# Patient Record
Sex: MALE | Race: WHITE | NOT HISPANIC OR LATINO
[De-identification: names, ages, dates, MRNs, and addresses within clinical notes are randomized per-mention and may not be internally consistent; named-entity substitution may affect disease eponyms.]

---

## 2017-02-21 ENCOUNTER — APPOINTMENT (OUTPATIENT)
Dept: CARDIOTHORACIC SURGERY | Facility: CLINIC | Age: 82
End: 2017-02-21

## 2017-02-21 VITALS
DIASTOLIC BLOOD PRESSURE: 72 MMHG | RESPIRATION RATE: 16 BRPM | HEIGHT: 69 IN | TEMPERATURE: 98 F | WEIGHT: 205 LBS | SYSTOLIC BLOOD PRESSURE: 132 MMHG | OXYGEN SATURATION: 96 % | BODY MASS INDEX: 30.36 KG/M2 | HEART RATE: 58 BPM

## 2017-02-21 VITALS — SYSTOLIC BLOOD PRESSURE: 128 MMHG | DIASTOLIC BLOOD PRESSURE: 69 MMHG

## 2017-02-21 DIAGNOSIS — Z87.891 PERSONAL HISTORY OF NICOTINE DEPENDENCE: ICD-10-CM

## 2017-02-27 ENCOUNTER — OUTPATIENT (OUTPATIENT)
Dept: OUTPATIENT SERVICES | Facility: HOSPITAL | Age: 82
LOS: 1 days | End: 2017-02-27

## 2017-02-27 ENCOUNTER — APPOINTMENT (OUTPATIENT)
Dept: CV DIAGNOSITCS | Facility: HOSPITAL | Age: 82
End: 2017-02-27

## 2017-02-27 ENCOUNTER — OUTPATIENT (OUTPATIENT)
Dept: OUTPATIENT SERVICES | Facility: HOSPITAL | Age: 82
LOS: 1 days | End: 2017-02-27
Payer: MEDICARE

## 2017-02-27 VITALS
DIASTOLIC BLOOD PRESSURE: 76 MMHG | TEMPERATURE: 98 F | SYSTOLIC BLOOD PRESSURE: 161 MMHG | HEIGHT: 69 IN | WEIGHT: 205.03 LBS | OXYGEN SATURATION: 98 %

## 2017-02-27 DIAGNOSIS — I35.0 NONRHEUMATIC AORTIC (VALVE) STENOSIS: ICD-10-CM

## 2017-02-27 DIAGNOSIS — Z96.652 PRESENCE OF LEFT ARTIFICIAL KNEE JOINT: Chronic | ICD-10-CM

## 2017-02-27 DIAGNOSIS — Z95.1 PRESENCE OF AORTOCORONARY BYPASS GRAFT: Chronic | ICD-10-CM

## 2017-02-27 DIAGNOSIS — I35.9 NONRHEUMATIC AORTIC VALVE DISORDER, UNSPECIFIED: ICD-10-CM

## 2017-02-27 LAB
ALBUMIN SERPL ELPH-MCNC: 4.8 G/DL — SIGNIFICANT CHANGE UP (ref 3.3–5)
ALP SERPL-CCNC: 73 U/L — SIGNIFICANT CHANGE UP (ref 40–120)
ALT FLD-CCNC: 26 U/L RC — SIGNIFICANT CHANGE UP (ref 10–45)
ANION GAP SERPL CALC-SCNC: 13 MMOL/L — SIGNIFICANT CHANGE UP (ref 5–17)
AST SERPL-CCNC: 25 U/L — SIGNIFICANT CHANGE UP (ref 10–40)
BILIRUB SERPL-MCNC: 0.8 MG/DL — SIGNIFICANT CHANGE UP (ref 0.2–1.2)
BUN SERPL-MCNC: 24 MG/DL — HIGH (ref 7–23)
CALCIUM SERPL-MCNC: 10.2 MG/DL — SIGNIFICANT CHANGE UP (ref 8.4–10.5)
CHLORIDE SERPL-SCNC: 103 MMOL/L — SIGNIFICANT CHANGE UP (ref 96–108)
CO2 SERPL-SCNC: 27 MMOL/L — SIGNIFICANT CHANGE UP (ref 22–31)
CREAT SERPL-MCNC: 1.14 MG/DL — SIGNIFICANT CHANGE UP (ref 0.5–1.3)
GLUCOSE SERPL-MCNC: 121 MG/DL — HIGH (ref 70–99)
HCT VFR BLD CALC: 46.2 % — SIGNIFICANT CHANGE UP (ref 39–50)
HGB BLD-MCNC: 16.4 G/DL — SIGNIFICANT CHANGE UP (ref 13–17)
MCHC RBC-ENTMCNC: 32.3 PG — SIGNIFICANT CHANGE UP (ref 27–34)
MCHC RBC-ENTMCNC: 35.4 GM/DL — SIGNIFICANT CHANGE UP (ref 32–36)
MCV RBC AUTO: 91.2 FL — SIGNIFICANT CHANGE UP (ref 80–100)
PLATELET # BLD AUTO: 193 K/UL — SIGNIFICANT CHANGE UP (ref 150–400)
POTASSIUM SERPL-MCNC: 4.4 MMOL/L — SIGNIFICANT CHANGE UP (ref 3.5–5.3)
POTASSIUM SERPL-SCNC: 4.4 MMOL/L — SIGNIFICANT CHANGE UP (ref 3.5–5.3)
PROT SERPL-MCNC: 7.7 G/DL — SIGNIFICANT CHANGE UP (ref 6–8.3)
RBC # BLD: 5.07 M/UL — SIGNIFICANT CHANGE UP (ref 4.2–5.8)
RBC # FLD: 13.5 % — SIGNIFICANT CHANGE UP (ref 10.3–14.5)
SODIUM SERPL-SCNC: 143 MMOL/L — SIGNIFICANT CHANGE UP (ref 135–145)
WBC # BLD: 7.6 K/UL — SIGNIFICANT CHANGE UP (ref 3.8–10.5)
WBC # FLD AUTO: 7.6 K/UL — SIGNIFICANT CHANGE UP (ref 3.8–10.5)

## 2017-02-27 PROCEDURE — 93010 ELECTROCARDIOGRAM REPORT: CPT

## 2017-02-27 PROCEDURE — 93306 TTE W/DOPPLER COMPLETE: CPT | Mod: 26

## 2017-02-27 PROCEDURE — 93459 L HRT ART/GRFT ANGIO: CPT

## 2017-02-27 PROCEDURE — 93459 L HRT ART/GRFT ANGIO: CPT | Mod: 26

## 2017-02-27 PROCEDURE — C1887: CPT

## 2017-02-27 PROCEDURE — 85027 COMPLETE CBC AUTOMATED: CPT

## 2017-02-27 PROCEDURE — 80053 COMPREHEN METABOLIC PANEL: CPT

## 2017-02-27 PROCEDURE — C1894: CPT

## 2017-02-27 PROCEDURE — 93005 ELECTROCARDIOGRAM TRACING: CPT

## 2017-02-27 PROCEDURE — 93306 TTE W/DOPPLER COMPLETE: CPT

## 2017-02-27 PROCEDURE — 93567 NJX CAR CTH SPRVLV AORTGRPHY: CPT

## 2017-02-27 PROCEDURE — C1769: CPT

## 2017-02-27 RX ORDER — SODIUM CHLORIDE 9 MG/ML
3 INJECTION INTRAMUSCULAR; INTRAVENOUS; SUBCUTANEOUS EVERY 8 HOURS
Qty: 0 | Refills: 0 | Status: DISCONTINUED | OUTPATIENT
Start: 2017-02-27 | End: 2017-03-14

## 2017-02-27 NOTE — H&P CARDIOLOGY - PMH
Atrial fibrillation  post-surgery, resolved  BPH (benign prostatic hyperplasia)    CAD (coronary artery disease)    Carpal tunnel syndrome of right wrist    HTN (hypertension)    Hyperlipidemia    Hypothyroidism    Inguinal hernia, bilateral

## 2017-02-27 NOTE — H&P CARDIOLOGY - HISTORY OF PRESENT ILLNESS
80 yo male with PMH significant for CAD s/p 3 vessel CABG (1996) and stenting (2004), Afib (post-surgery, resolved), HTN, HLD, and Hypothyroidism. mild AS c/o of exertional dyspnea & chest tightness, denies chest pain, diaphoresis, seen & evaluated by Dr Wai Grajeda, & now recommends for cardiac cath.

## 2017-02-27 NOTE — H&P CARDIOLOGY - FAMILY HISTORY
Father  Still living? No  Family history of hypertension, Age at diagnosis: Age Unknown  Family history of myocardial infarction, Age at diagnosis: Age Unknown     Mother  Still living? No  Family history of stroke, Age at diagnosis: Age Unknown

## 2017-02-27 NOTE — H&P CARDIOLOGY - PSH
S/P CABG (coronary artery bypass graft)  07/1996  S/P coronary angioplasty  2004 drug eluting  S/P hernia repair  inquinal 2005  S/P knee replacement, left    S/P tonsillectomy  at age 5 years old

## 2017-03-08 LAB
ALBUMIN SERPL ELPH-MCNC: 4.3 G/DL
ALP BLD-CCNC: 73 U/L
ALT SERPL-CCNC: 15 U/L
ANION GAP SERPL CALC-SCNC: 17 MMOL/L
AST SERPL-CCNC: 21 U/L
BASOPHILS # BLD AUTO: 0.04 K/UL
BASOPHILS NFR BLD AUTO: 0.5 %
BILIRUB SERPL-MCNC: 0.6 MG/DL
BUN SERPL-MCNC: 22 MG/DL
CALCIUM SERPL-MCNC: 10.4 MG/DL
CHLORIDE SERPL-SCNC: 103 MMOL/L
CO2 SERPL-SCNC: 25 MMOL/L
CREAT SERPL-MCNC: 1.15 MG/DL
EOSINOPHIL # BLD AUTO: 0.4 K/UL
EOSINOPHIL NFR BLD AUTO: 4.7 %
GLUCOSE SERPL-MCNC: 100 MG/DL
HCT VFR BLD CALC: 43.7 %
HGB BLD-MCNC: 15.1 G/DL
IMM GRANULOCYTES NFR BLD AUTO: 0.2 %
LYMPHOCYTES # BLD AUTO: 2.17 K/UL
LYMPHOCYTES NFR BLD AUTO: 25.5 %
MAN DIFF?: NORMAL
MCHC RBC-ENTMCNC: 30.8 PG
MCHC RBC-ENTMCNC: 34.6 GM/DL
MCV RBC AUTO: 89 FL
MONOCYTES # BLD AUTO: 0.65 K/UL
MONOCYTES NFR BLD AUTO: 7.6 %
NEUTROPHILS # BLD AUTO: 5.24 K/UL
NEUTROPHILS NFR BLD AUTO: 61.5 %
NT-PROBNP SERPL-MCNC: 343 PG/ML
PLATELET # BLD AUTO: 236 K/UL
POTASSIUM SERPL-SCNC: 4.4 MMOL/L
PROT SERPL-MCNC: 7.8 G/DL
RBC # BLD: 4.91 M/UL
RBC # FLD: 14.6 %
SODIUM SERPL-SCNC: 145 MMOL/L
WBC # FLD AUTO: 8.52 K/UL

## 2017-03-21 ENCOUNTER — OUTPATIENT (OUTPATIENT)
Dept: OUTPATIENT SERVICES | Facility: HOSPITAL | Age: 82
LOS: 1 days | End: 2017-03-21
Payer: MEDICARE

## 2017-03-21 ENCOUNTER — APPOINTMENT (OUTPATIENT)
Dept: CARDIOTHORACIC SURGERY | Facility: CLINIC | Age: 82
End: 2017-03-21

## 2017-03-21 ENCOUNTER — APPOINTMENT (OUTPATIENT)
Dept: CARDIOLOGY | Facility: HOSPITAL | Age: 82
End: 2017-03-21

## 2017-03-21 VITALS
SYSTOLIC BLOOD PRESSURE: 167 MMHG | WEIGHT: 204 LBS | TEMPERATURE: 97.8 F | RESPIRATION RATE: 15 BRPM | OXYGEN SATURATION: 97 % | BODY MASS INDEX: 30.21 KG/M2 | DIASTOLIC BLOOD PRESSURE: 85 MMHG | HEIGHT: 69 IN | HEART RATE: 59 BPM

## 2017-03-21 DIAGNOSIS — Z95.1 PRESENCE OF AORTOCORONARY BYPASS GRAFT: Chronic | ICD-10-CM

## 2017-03-21 DIAGNOSIS — I35.0 NONRHEUMATIC AORTIC (VALVE) STENOSIS: ICD-10-CM

## 2017-03-21 DIAGNOSIS — R07.9 CHEST PAIN, UNSPECIFIED: ICD-10-CM

## 2017-03-21 DIAGNOSIS — Z96.652 PRESENCE OF LEFT ARTIFICIAL KNEE JOINT: Chronic | ICD-10-CM

## 2017-03-21 PROCEDURE — 75572 CT HRT W/3D IMAGE: CPT

## 2017-03-21 PROCEDURE — 75572 CT HRT W/3D IMAGE: CPT | Mod: 26

## 2017-03-24 ENCOUNTER — APPOINTMENT (OUTPATIENT)
Dept: PULMONOLOGY | Facility: CLINIC | Age: 82
End: 2017-03-24

## 2017-03-28 ENCOUNTER — RESULT REVIEW (OUTPATIENT)
Age: 82
End: 2017-03-28

## 2017-04-03 ENCOUNTER — OUTPATIENT (OUTPATIENT)
Dept: OUTPATIENT SERVICES | Facility: HOSPITAL | Age: 82
LOS: 1 days | End: 2017-04-03
Payer: MEDICARE

## 2017-04-03 ENCOUNTER — APPOINTMENT (OUTPATIENT)
Dept: ULTRASOUND IMAGING | Facility: HOSPITAL | Age: 82
End: 2017-04-03

## 2017-04-03 VITALS
HEIGHT: 69 IN | DIASTOLIC BLOOD PRESSURE: 84 MMHG | RESPIRATION RATE: 16 BRPM | SYSTOLIC BLOOD PRESSURE: 137 MMHG | TEMPERATURE: 98 F | HEART RATE: 86 BPM | OXYGEN SATURATION: 96 % | WEIGHT: 205.03 LBS

## 2017-04-03 DIAGNOSIS — Z01.818 ENCOUNTER FOR OTHER PREPROCEDURAL EXAMINATION: ICD-10-CM

## 2017-04-03 DIAGNOSIS — I35.0 NONRHEUMATIC AORTIC (VALVE) STENOSIS: ICD-10-CM

## 2017-04-03 DIAGNOSIS — E03.9 HYPOTHYROIDISM, UNSPECIFIED: ICD-10-CM

## 2017-04-03 DIAGNOSIS — I10 ESSENTIAL (PRIMARY) HYPERTENSION: ICD-10-CM

## 2017-04-03 DIAGNOSIS — Z96.653 PRESENCE OF ARTIFICIAL KNEE JOINT, BILATERAL: Chronic | ICD-10-CM

## 2017-04-03 DIAGNOSIS — Z92.89 PERSONAL HISTORY OF OTHER MEDICAL TREATMENT: Chronic | ICD-10-CM

## 2017-04-03 DIAGNOSIS — E78.5 HYPERLIPIDEMIA, UNSPECIFIED: ICD-10-CM

## 2017-04-03 DIAGNOSIS — Z95.1 PRESENCE OF AORTOCORONARY BYPASS GRAFT: Chronic | ICD-10-CM

## 2017-04-03 DIAGNOSIS — Z96.652 PRESENCE OF LEFT ARTIFICIAL KNEE JOINT: Chronic | ICD-10-CM

## 2017-04-03 LAB
ANION GAP SERPL CALC-SCNC: 21 MMOL/L — HIGH (ref 5–17)
BLD GP AB SCN SERPL QL: NEGATIVE — SIGNIFICANT CHANGE UP
BUN SERPL-MCNC: 23 MG/DL — SIGNIFICANT CHANGE UP (ref 7–23)
CALCIUM SERPL-MCNC: 9.8 MG/DL — SIGNIFICANT CHANGE UP (ref 8.4–10.5)
CHLORIDE SERPL-SCNC: 101 MMOL/L — SIGNIFICANT CHANGE UP (ref 96–108)
CO2 SERPL-SCNC: 22 MMOL/L — SIGNIFICANT CHANGE UP (ref 22–31)
CREAT SERPL-MCNC: 1.17 MG/DL — SIGNIFICANT CHANGE UP (ref 0.5–1.3)
GLUCOSE SERPL-MCNC: 100 MG/DL — HIGH (ref 70–99)
HBA1C BLD-MCNC: 5.5 % — SIGNIFICANT CHANGE UP (ref 4–5.6)
HCT VFR BLD CALC: 44.6 % — SIGNIFICANT CHANGE UP (ref 39–50)
HGB BLD-MCNC: 15.3 G/DL — SIGNIFICANT CHANGE UP (ref 13–17)
MCHC RBC-ENTMCNC: 30.5 PG — SIGNIFICANT CHANGE UP (ref 27–34)
MCHC RBC-ENTMCNC: 34.3 GM/DL — SIGNIFICANT CHANGE UP (ref 32–36)
MCV RBC AUTO: 89 FL — SIGNIFICANT CHANGE UP (ref 80–100)
MRSA PCR RESULT.: SIGNIFICANT CHANGE UP
PLATELET # BLD AUTO: 214 K/UL — SIGNIFICANT CHANGE UP (ref 150–400)
POTASSIUM SERPL-MCNC: 4.9 MMOL/L — SIGNIFICANT CHANGE UP (ref 3.5–5.3)
POTASSIUM SERPL-SCNC: 4.9 MMOL/L — SIGNIFICANT CHANGE UP (ref 3.5–5.3)
RBC # BLD: 5.01 M/UL — SIGNIFICANT CHANGE UP (ref 4.2–5.8)
RBC # FLD: 14.9 % — HIGH (ref 10.3–14.5)
RH IG SCN BLD-IMP: POSITIVE — SIGNIFICANT CHANGE UP
S AUREUS DNA NOSE QL NAA+PROBE: SIGNIFICANT CHANGE UP
SODIUM SERPL-SCNC: 144 MMOL/L — SIGNIFICANT CHANGE UP (ref 135–145)
WBC # BLD: 6.9 K/UL — SIGNIFICANT CHANGE UP (ref 3.8–10.5)
WBC # FLD AUTO: 6.9 K/UL — SIGNIFICANT CHANGE UP (ref 3.8–10.5)

## 2017-04-03 PROCEDURE — 71020: CPT | Mod: 26

## 2017-04-03 PROCEDURE — 93880 EXTRACRANIAL BILAT STUDY: CPT | Mod: 26

## 2017-04-03 RX ORDER — CEFUROXIME AXETIL 250 MG
1500 TABLET ORAL ONCE
Qty: 0 | Refills: 0 | Status: COMPLETED | OUTPATIENT
Start: 2017-04-05 | End: 2017-04-05

## 2017-04-03 NOTE — H&P PST ADULT - HISTORY OF PRESENT ILLNESS
Mr Hassan is a 78 year old male with past medical hx of hypothyroidism, CARB, Drug eluting Coronary stent x1, Hyperlipidemia, BPH, HTN, Inguinal hernia presents for preop evaluation for Right Carpal Tunnel Release on 02/03/2014 80 yo male with PMH significant for CAD s/p 3 vessel CABG (1996) and stenting (2004), Afib (post-surgery, resolved), HTN, HLD, and Hypothyroidism. mild AS c/o of exertional dyspnea & chest tightness, denies chest pain, diaphoresis 82 yo male with PMH significant for CAD s/p 3 vessel CABG (1996) and stenting (2004), Afib (post-surgery, resolved), HTN, HLD, and Hypothyroidism. mild AS c/o of exertional dyspnea & chest tightness, denies chest pain, diaphoresis work up done presents to PST for TAVR 82 yo male with PMH significant for CAD s/p 3 vessel CABG (1996) and stenting (2004), Afib (post-surgery, resolved), HTN, HLD, and Hypothyroidism,  AS c/o of exertional dyspnea & chest tightness, denies chest pain, diaphoresis work up done presents to PST for TAVR

## 2017-04-03 NOTE — H&P PST ADULT - PMH
BPH (benign prostatic hyperplasia)    Carpal tunnel syndrome of right wrist    HTN (hypertension)    Hyperlipidemia    Hypothyroidism    Inguinal hernia, bilateral    S/P coronary artery stent placement  drug eluting stent  X 1 Atrial fibrillation  post-surgery, resolved  BPH (benign prostatic hyperplasia)    CAD (coronary artery disease)    Carpal tunnel syndrome of right wrist    [place] (hard of hearing)  hearing aid bilateral  HTN (hypertension)    Hyperlipidemia    Hypothyroidism    Inguinal hernia, bilateral    Low back pain    Stented coronary artery  2004 Aortic stenosis    Atrial fibrillation  post-surgery, resolved  BPH (benign prostatic hyperplasia)    CAD (coronary artery disease)    Carpal tunnel syndrome of right wrist    [place] (hard of hearing)  hearing aid bilateral  HTN (hypertension)    Hyperlipidemia    Hypothyroidism    Inguinal hernia, bilateral    Low back pain    Stented coronary artery  2004

## 2017-04-03 NOTE — H&P PST ADULT - NSANTHOSAYNRD_GEN_A_CORE
No. INDRA screening performed.  STOP BANG Legend: 0-2 = LOW Risk; 3-4 = INTERMEDIATE Risk; 5-8 = HIGH Risk

## 2017-04-03 NOTE — H&P PST ADULT - PSH
S/P CABG (coronary artery bypass graft)  07/1996  S/P coronary angioplasty  2004 drug eluting  S/P hernia repair  inquinal 2005  S/P knee replacement, left    S/P tonsillectomy  at age 5 years old History of carpal tunnel surgery  2014  History of knee replacement, total, bilateral  right 2016  left 2011  S/P CABG (coronary artery bypass graft)  07/1996 CABG x3  S/P coronary angioplasty  2004 drug eluting  S/P hernia repair  inquinal 2005  S/P tonsillectomy  at age 5 years old

## 2017-04-05 ENCOUNTER — INPATIENT (INPATIENT)
Facility: HOSPITAL | Age: 82
LOS: 1 days | Discharge: HOME | DRG: 267 | End: 2017-04-07
Attending: THORACIC SURGERY (CARDIOTHORACIC VASCULAR SURGERY) | Admitting: THORACIC SURGERY (CARDIOTHORACIC VASCULAR SURGERY)
Payer: MEDICARE

## 2017-04-05 ENCOUNTER — APPOINTMENT (OUTPATIENT)
Dept: CARDIOTHORACIC SURGERY | Facility: HOSPITAL | Age: 82
End: 2017-04-05

## 2017-04-05 VITALS
HEART RATE: 57 BPM | SYSTOLIC BLOOD PRESSURE: 166 MMHG | RESPIRATION RATE: 18 BRPM | TEMPERATURE: 98 F | WEIGHT: 206.79 LBS | OXYGEN SATURATION: 99 % | DIASTOLIC BLOOD PRESSURE: 81 MMHG

## 2017-04-05 DIAGNOSIS — Z95.1 PRESENCE OF AORTOCORONARY BYPASS GRAFT: Chronic | ICD-10-CM

## 2017-04-05 DIAGNOSIS — I35.0 NONRHEUMATIC AORTIC (VALVE) STENOSIS: ICD-10-CM

## 2017-04-05 DIAGNOSIS — Z96.653 PRESENCE OF ARTIFICIAL KNEE JOINT, BILATERAL: Chronic | ICD-10-CM

## 2017-04-05 DIAGNOSIS — Z92.89 PERSONAL HISTORY OF OTHER MEDICAL TREATMENT: Chronic | ICD-10-CM

## 2017-04-05 LAB
ALBUMIN SERPL ELPH-MCNC: 3.9 G/DL — SIGNIFICANT CHANGE UP (ref 3.3–5)
ALP SERPL-CCNC: 61 U/L — SIGNIFICANT CHANGE UP (ref 40–120)
ALT FLD-CCNC: 23 U/L RC — SIGNIFICANT CHANGE UP (ref 10–45)
ANION GAP SERPL CALC-SCNC: 13 MMOL/L — SIGNIFICANT CHANGE UP (ref 5–17)
APTT BLD: 30 SEC — SIGNIFICANT CHANGE UP (ref 27.5–37.4)
AST SERPL-CCNC: 25 U/L — SIGNIFICANT CHANGE UP (ref 10–40)
BASOPHILS # BLD AUTO: 0 K/UL — SIGNIFICANT CHANGE UP (ref 0–0.2)
BASOPHILS NFR BLD AUTO: 0.4 % — SIGNIFICANT CHANGE UP (ref 0–2)
BILIRUB SERPL-MCNC: 0.8 MG/DL — SIGNIFICANT CHANGE UP (ref 0.2–1.2)
BUN SERPL-MCNC: 26 MG/DL — HIGH (ref 7–23)
CALCIUM SERPL-MCNC: 9.2 MG/DL — SIGNIFICANT CHANGE UP (ref 8.4–10.5)
CHLORIDE SERPL-SCNC: 104 MMOL/L — SIGNIFICANT CHANGE UP (ref 96–108)
CK MB BLD-MCNC: 1.8 % — SIGNIFICANT CHANGE UP (ref 0–3.5)
CK MB CFR SERPL CALC: 6.6 NG/ML — SIGNIFICANT CHANGE UP (ref 0–6.7)
CK SERPL-CCNC: 358 U/L — HIGH (ref 30–200)
CO2 SERPL-SCNC: 23 MMOL/L — SIGNIFICANT CHANGE UP (ref 22–31)
CREAT SERPL-MCNC: 1.05 MG/DL — SIGNIFICANT CHANGE UP (ref 0.5–1.3)
EOSINOPHIL # BLD AUTO: 0.2 K/UL — SIGNIFICANT CHANGE UP (ref 0–0.5)
EOSINOPHIL NFR BLD AUTO: 3.1 % — SIGNIFICANT CHANGE UP (ref 0–6)
FIBRINOGEN PPP-MCNC: 418 MG/DL — SIGNIFICANT CHANGE UP (ref 310–510)
GAS PNL BLDA: SIGNIFICANT CHANGE UP
GLUCOSE SERPL-MCNC: 128 MG/DL — HIGH (ref 70–99)
HCT VFR BLD CALC: 40.3 % — SIGNIFICANT CHANGE UP (ref 39–50)
HGB BLD-MCNC: 14.1 G/DL — SIGNIFICANT CHANGE UP (ref 13–17)
INR BLD: 1.03 RATIO — SIGNIFICANT CHANGE UP (ref 0.88–1.16)
LYMPHOCYTES # BLD AUTO: 1.2 K/UL — SIGNIFICANT CHANGE UP (ref 1–3.3)
LYMPHOCYTES # BLD AUTO: 15.5 % — SIGNIFICANT CHANGE UP (ref 13–44)
MCHC RBC-ENTMCNC: 32.1 PG — SIGNIFICANT CHANGE UP (ref 27–34)
MCHC RBC-ENTMCNC: 35 GM/DL — SIGNIFICANT CHANGE UP (ref 32–36)
MCV RBC AUTO: 91.6 FL — SIGNIFICANT CHANGE UP (ref 80–100)
MONOCYTES # BLD AUTO: 0.2 K/UL — SIGNIFICANT CHANGE UP (ref 0–0.9)
MONOCYTES NFR BLD AUTO: 2.6 % — SIGNIFICANT CHANGE UP (ref 2–14)
NEUTROPHILS # BLD AUTO: 6 K/UL — SIGNIFICANT CHANGE UP (ref 1.8–7.4)
NEUTROPHILS NFR BLD AUTO: 78.5 % — HIGH (ref 43–77)
PLATELET # BLD AUTO: 145 K/UL — LOW (ref 150–400)
POTASSIUM SERPL-MCNC: 3.9 MMOL/L — SIGNIFICANT CHANGE UP (ref 3.5–5.3)
POTASSIUM SERPL-SCNC: 3.9 MMOL/L — SIGNIFICANT CHANGE UP (ref 3.5–5.3)
PROT SERPL-MCNC: 6.3 G/DL — SIGNIFICANT CHANGE UP (ref 6–8.3)
PROTHROM AB SERPL-ACNC: 11.1 SEC — SIGNIFICANT CHANGE UP (ref 9.8–12.7)
RBC # BLD: 4.4 M/UL — SIGNIFICANT CHANGE UP (ref 4.2–5.8)
RBC # FLD: 13.2 % — SIGNIFICANT CHANGE UP (ref 10.3–14.5)
RH IG SCN BLD-IMP: POSITIVE — SIGNIFICANT CHANGE UP
SODIUM SERPL-SCNC: 140 MMOL/L — SIGNIFICANT CHANGE UP (ref 135–145)
TROPONIN T SERPL-MCNC: 0.04 NG/ML — SIGNIFICANT CHANGE UP (ref 0–0.06)
WBC # BLD: 7.7 K/UL — SIGNIFICANT CHANGE UP (ref 3.8–10.5)
WBC # FLD AUTO: 7.7 K/UL — SIGNIFICANT CHANGE UP (ref 3.8–10.5)

## 2017-04-05 PROCEDURE — 33361 REPLACE AORTIC VALVE PERQ: CPT | Mod: 62,Q0

## 2017-04-05 PROCEDURE — 93355 ECHO TRANSESOPHAGEAL (TEE): CPT

## 2017-04-05 PROCEDURE — 93010 ELECTROCARDIOGRAM REPORT: CPT

## 2017-04-05 RX ORDER — INSULIN HUMAN 100 [IU]/ML
2 INJECTION, SOLUTION SUBCUTANEOUS
Qty: 100 | Refills: 0 | Status: DISCONTINUED | OUTPATIENT
Start: 2017-04-05 | End: 2017-04-05

## 2017-04-05 RX ORDER — SODIUM CHLORIDE 9 MG/ML
1000 INJECTION, SOLUTION INTRAVENOUS
Qty: 0 | Refills: 0 | Status: DISCONTINUED | OUTPATIENT
Start: 2017-04-05 | End: 2017-04-07

## 2017-04-05 RX ORDER — SODIUM CHLORIDE 9 MG/ML
3 INJECTION INTRAMUSCULAR; INTRAVENOUS; SUBCUTANEOUS EVERY 8 HOURS
Qty: 0 | Refills: 0 | Status: DISCONTINUED | OUTPATIENT
Start: 2017-04-05 | End: 2017-04-05

## 2017-04-05 RX ORDER — ASPIRIN/CALCIUM CARB/MAGNESIUM 324 MG
325 TABLET ORAL DAILY
Qty: 0 | Refills: 0 | Status: DISCONTINUED | OUTPATIENT
Start: 2017-04-05 | End: 2017-04-07

## 2017-04-05 RX ORDER — PANTOPRAZOLE SODIUM 20 MG/1
40 TABLET, DELAYED RELEASE ORAL DAILY
Qty: 0 | Refills: 0 | Status: DISCONTINUED | OUTPATIENT
Start: 2017-04-05 | End: 2017-04-07

## 2017-04-05 RX ORDER — CEFUROXIME AXETIL 250 MG
1500 TABLET ORAL EVERY 8 HOURS
Qty: 0 | Refills: 0 | Status: COMPLETED | OUTPATIENT
Start: 2017-04-05 | End: 2017-04-06

## 2017-04-05 RX ORDER — ALBUMIN HUMAN 25 %
250 VIAL (ML) INTRAVENOUS ONCE
Qty: 0 | Refills: 0 | Status: COMPLETED | OUTPATIENT
Start: 2017-04-05 | End: 2017-04-05

## 2017-04-05 RX ORDER — CLOPIDOGREL BISULFATE 75 MG/1
75 TABLET, FILM COATED ORAL DAILY
Qty: 0 | Refills: 0 | Status: DISCONTINUED | OUTPATIENT
Start: 2017-04-06 | End: 2017-04-06

## 2017-04-05 RX ORDER — ONDANSETRON 8 MG/1
4 TABLET, FILM COATED ORAL ONCE
Qty: 0 | Refills: 0 | Status: COMPLETED | OUTPATIENT
Start: 2017-04-05 | End: 2017-04-05

## 2017-04-05 RX ORDER — METOCLOPRAMIDE HCL 10 MG
10 TABLET ORAL EVERY 8 HOURS
Qty: 0 | Refills: 0 | Status: COMPLETED | OUTPATIENT
Start: 2017-04-05 | End: 2017-04-06

## 2017-04-05 RX ORDER — POTASSIUM CHLORIDE 20 MEQ
20 PACKET (EA) ORAL ONCE
Qty: 0 | Refills: 0 | Status: COMPLETED | OUTPATIENT
Start: 2017-04-05 | End: 2017-04-05

## 2017-04-05 RX ORDER — LEVOTHYROXINE SODIUM 125 MCG
50 TABLET ORAL DAILY
Qty: 0 | Refills: 0 | Status: DISCONTINUED | OUTPATIENT
Start: 2017-04-05 | End: 2017-04-07

## 2017-04-05 RX ORDER — MEPERIDINE HYDROCHLORIDE 50 MG/ML
25 INJECTION INTRAMUSCULAR; INTRAVENOUS; SUBCUTANEOUS ONCE
Qty: 0 | Refills: 0 | Status: DISCONTINUED | OUTPATIENT
Start: 2017-04-05 | End: 2017-04-07

## 2017-04-05 RX ORDER — CLOPIDOGREL BISULFATE 75 MG/1
300 TABLET, FILM COATED ORAL ONCE
Qty: 0 | Refills: 0 | Status: COMPLETED | OUTPATIENT
Start: 2017-04-05 | End: 2017-04-05

## 2017-04-05 RX ORDER — ATORVASTATIN CALCIUM 80 MG/1
40 TABLET, FILM COATED ORAL AT BEDTIME
Qty: 0 | Refills: 0 | Status: DISCONTINUED | OUTPATIENT
Start: 2017-04-05 | End: 2017-04-07

## 2017-04-05 RX ADMIN — ONDANSETRON 4 MILLIGRAM(S): 8 TABLET, FILM COATED ORAL at 14:25

## 2017-04-05 RX ADMIN — CLOPIDOGREL BISULFATE 300 MILLIGRAM(S): 75 TABLET, FILM COATED ORAL at 12:05

## 2017-04-05 RX ADMIN — Medication 125 MILLILITER(S): at 18:00

## 2017-04-05 RX ADMIN — Medication 20 MILLIEQUIVALENT(S): at 12:07

## 2017-04-05 RX ADMIN — ATORVASTATIN CALCIUM 40 MILLIGRAM(S): 80 TABLET, FILM COATED ORAL at 21:29

## 2017-04-05 RX ADMIN — Medication 325 MILLIGRAM(S): at 12:05

## 2017-04-05 RX ADMIN — PANTOPRAZOLE SODIUM 40 MILLIGRAM(S): 20 TABLET, DELAYED RELEASE ORAL at 12:07

## 2017-04-05 RX ADMIN — Medication 125 MILLILITER(S): at 17:00

## 2017-04-05 RX ADMIN — Medication 100 MILLIGRAM(S): at 15:39

## 2017-04-05 RX ADMIN — Medication 10 MILLIGRAM(S): at 15:39

## 2017-04-05 RX ADMIN — SODIUM CHLORIDE 3 MILLILITER(S): 9 INJECTION INTRAMUSCULAR; INTRAVENOUS; SUBCUTANEOUS at 06:40

## 2017-04-05 RX ADMIN — Medication 10 MILLIGRAM(S): at 21:29

## 2017-04-05 NOTE — PATIENT PROFILE ADULT. - PSH
History of carpal tunnel surgery  2014  History of knee replacement, total, bilateral  right 2016  left 2011  S/P CABG (coronary artery bypass graft)  07/1996 CABG x3  S/P coronary angioplasty  2004 drug eluting  S/P hernia repair  inquinal 2005  S/P tonsillectomy  at age 5 years old

## 2017-04-05 NOTE — PATIENT PROFILE ADULT. - PMH
Aortic stenosis    Atrial fibrillation  post-surgery, resolved  BPH (benign prostatic hyperplasia)    CAD (coronary artery disease)    Carpal tunnel syndrome of right wrist    [place] (hard of hearing)  hearing aid bilateral  HTN (hypertension)    Hyperlipidemia    Hypothyroidism    Inguinal hernia, bilateral    Low back pain    Stented coronary artery  2004

## 2017-04-06 LAB
ANION GAP SERPL CALC-SCNC: 13 MMOL/L — SIGNIFICANT CHANGE UP (ref 5–17)
BASOPHILS # BLD AUTO: 0 K/UL — SIGNIFICANT CHANGE UP (ref 0–0.2)
BASOPHILS NFR BLD AUTO: 0.1 % — SIGNIFICANT CHANGE UP (ref 0–2)
BUN SERPL-MCNC: 25 MG/DL — HIGH (ref 7–23)
CALCIUM SERPL-MCNC: 9.2 MG/DL — SIGNIFICANT CHANGE UP (ref 8.4–10.5)
CHLORIDE SERPL-SCNC: 103 MMOL/L — SIGNIFICANT CHANGE UP (ref 96–108)
CO2 SERPL-SCNC: 23 MMOL/L — SIGNIFICANT CHANGE UP (ref 22–31)
CREAT SERPL-MCNC: 1.23 MG/DL — SIGNIFICANT CHANGE UP (ref 0.5–1.3)
EOSINOPHIL # BLD AUTO: 0 K/UL — SIGNIFICANT CHANGE UP (ref 0–0.5)
EOSINOPHIL NFR BLD AUTO: 0.2 % — SIGNIFICANT CHANGE UP (ref 0–6)
GAS PNL BLDA: SIGNIFICANT CHANGE UP
GLUCOSE SERPL-MCNC: 147 MG/DL — HIGH (ref 70–99)
HCT VFR BLD CALC: 38.5 % — LOW (ref 39–50)
HGB BLD-MCNC: 13.4 G/DL — SIGNIFICANT CHANGE UP (ref 13–17)
LYMPHOCYTES # BLD AUTO: 1.2 K/UL — SIGNIFICANT CHANGE UP (ref 1–3.3)
LYMPHOCYTES # BLD AUTO: 12.2 % — LOW (ref 13–44)
MCHC RBC-ENTMCNC: 32.1 PG — SIGNIFICANT CHANGE UP (ref 27–34)
MCHC RBC-ENTMCNC: 34.9 GM/DL — SIGNIFICANT CHANGE UP (ref 32–36)
MCV RBC AUTO: 92 FL — SIGNIFICANT CHANGE UP (ref 80–100)
MONOCYTES # BLD AUTO: 0.7 K/UL — SIGNIFICANT CHANGE UP (ref 0–0.9)
MONOCYTES NFR BLD AUTO: 7.2 % — SIGNIFICANT CHANGE UP (ref 2–14)
NEUTROPHILS # BLD AUTO: 7.6 K/UL — HIGH (ref 1.8–7.4)
NEUTROPHILS NFR BLD AUTO: 80.3 % — HIGH (ref 43–77)
PLATELET # BLD AUTO: 140 K/UL — LOW (ref 150–400)
POTASSIUM SERPL-MCNC: 4.1 MMOL/L — SIGNIFICANT CHANGE UP (ref 3.5–5.3)
POTASSIUM SERPL-SCNC: 4.1 MMOL/L — SIGNIFICANT CHANGE UP (ref 3.5–5.3)
RBC # BLD: 4.18 M/UL — LOW (ref 4.2–5.8)
RBC # FLD: 13.6 % — SIGNIFICANT CHANGE UP (ref 10.3–14.5)
SODIUM SERPL-SCNC: 139 MMOL/L — SIGNIFICANT CHANGE UP (ref 135–145)
WBC # BLD: 9.5 K/UL — SIGNIFICANT CHANGE UP (ref 3.8–10.5)
WBC # FLD AUTO: 9.5 K/UL — SIGNIFICANT CHANGE UP (ref 3.8–10.5)

## 2017-04-06 PROCEDURE — 71010: CPT | Mod: 26

## 2017-04-06 PROCEDURE — 93010 ELECTROCARDIOGRAM REPORT: CPT

## 2017-04-06 RX ORDER — ENOXAPARIN SODIUM 100 MG/ML
40 INJECTION SUBCUTANEOUS DAILY
Qty: 0 | Refills: 0 | Status: DISCONTINUED | OUTPATIENT
Start: 2017-04-06 | End: 2017-04-07

## 2017-04-06 RX ORDER — DEXTROSE 50 % IN WATER 50 %
12.5 SYRINGE (ML) INTRAVENOUS ONCE
Qty: 0 | Refills: 0 | Status: DISCONTINUED | OUTPATIENT
Start: 2017-04-06 | End: 2017-04-07

## 2017-04-06 RX ORDER — INSULIN LISPRO 100/ML
VIAL (ML) SUBCUTANEOUS
Qty: 0 | Refills: 0 | Status: DISCONTINUED | OUTPATIENT
Start: 2017-04-06 | End: 2017-04-07

## 2017-04-06 RX ORDER — METOPROLOL TARTRATE 50 MG
25 TABLET ORAL ONCE
Qty: 0 | Refills: 0 | Status: COMPLETED | OUTPATIENT
Start: 2017-04-06 | End: 2017-04-06

## 2017-04-06 RX ORDER — HYDRALAZINE HCL 50 MG
5 TABLET ORAL ONCE
Qty: 0 | Refills: 0 | Status: COMPLETED | OUTPATIENT
Start: 2017-04-06 | End: 2017-04-06

## 2017-04-06 RX ORDER — SODIUM CHLORIDE 9 MG/ML
1000 INJECTION, SOLUTION INTRAVENOUS
Qty: 0 | Refills: 0 | Status: DISCONTINUED | OUTPATIENT
Start: 2017-04-06 | End: 2017-04-07

## 2017-04-06 RX ORDER — NICARDIPINE HYDROCHLORIDE 30 MG/1
3 CAPSULE, EXTENDED RELEASE ORAL
Qty: 40 | Refills: 0 | Status: DISCONTINUED | OUTPATIENT
Start: 2017-04-06 | End: 2017-04-06

## 2017-04-06 RX ORDER — DEXTROSE 50 % IN WATER 50 %
1 SYRINGE (ML) INTRAVENOUS ONCE
Qty: 0 | Refills: 0 | Status: DISCONTINUED | OUTPATIENT
Start: 2017-04-06 | End: 2017-04-07

## 2017-04-06 RX ORDER — METOPROLOL TARTRATE 50 MG
25 TABLET ORAL
Qty: 0 | Refills: 0 | Status: DISCONTINUED | OUTPATIENT
Start: 2017-04-06 | End: 2017-04-06

## 2017-04-06 RX ORDER — DEXTROSE 50 % IN WATER 50 %
25 SYRINGE (ML) INTRAVENOUS ONCE
Qty: 0 | Refills: 0 | Status: DISCONTINUED | OUTPATIENT
Start: 2017-04-06 | End: 2017-04-07

## 2017-04-06 RX ORDER — GLUCAGON INJECTION, SOLUTION 0.5 MG/.1ML
1 INJECTION, SOLUTION SUBCUTANEOUS ONCE
Qty: 0 | Refills: 0 | Status: DISCONTINUED | OUTPATIENT
Start: 2017-04-06 | End: 2017-04-07

## 2017-04-06 RX ADMIN — NICARDIPINE HYDROCHLORIDE 15 MG/HR: 30 CAPSULE, EXTENDED RELEASE ORAL at 05:03

## 2017-04-06 RX ADMIN — Medication 5 MILLIGRAM(S): at 10:42

## 2017-04-06 RX ADMIN — Medication 100 MILLIGRAM(S): at 00:28

## 2017-04-06 RX ADMIN — ENOXAPARIN SODIUM 40 MILLIGRAM(S): 100 INJECTION SUBCUTANEOUS at 11:43

## 2017-04-06 RX ADMIN — Medication 10 MILLIGRAM(S): at 21:47

## 2017-04-06 RX ADMIN — Medication 10 MILLIGRAM(S): at 14:44

## 2017-04-06 RX ADMIN — ATORVASTATIN CALCIUM 40 MILLIGRAM(S): 80 TABLET, FILM COATED ORAL at 21:47

## 2017-04-06 RX ADMIN — PANTOPRAZOLE SODIUM 40 MILLIGRAM(S): 20 TABLET, DELAYED RELEASE ORAL at 12:40

## 2017-04-06 RX ADMIN — Medication 325 MILLIGRAM(S): at 11:43

## 2017-04-06 RX ADMIN — Medication 10 MILLIGRAM(S): at 06:06

## 2017-04-06 RX ADMIN — Medication 25 MILLIGRAM(S): at 09:00

## 2017-04-06 RX ADMIN — CLOPIDOGREL BISULFATE 75 MILLIGRAM(S): 75 TABLET, FILM COATED ORAL at 11:43

## 2017-04-06 RX ADMIN — Medication 100 MILLIGRAM(S): at 08:30

## 2017-04-06 RX ADMIN — Medication 50 MICROGRAM(S): at 06:04

## 2017-04-07 ENCOUNTER — TRANSCRIPTION ENCOUNTER (OUTPATIENT)
Age: 82
End: 2017-04-07

## 2017-04-07 VITALS — WEIGHT: 205.69 LBS

## 2017-04-07 LAB
ANION GAP SERPL CALC-SCNC: 11 MMOL/L — SIGNIFICANT CHANGE UP (ref 5–17)
BUN SERPL-MCNC: 29 MG/DL — HIGH (ref 7–23)
CALCIUM SERPL-MCNC: 9.3 MG/DL — SIGNIFICANT CHANGE UP (ref 8.4–10.5)
CHLORIDE SERPL-SCNC: 106 MMOL/L — SIGNIFICANT CHANGE UP (ref 96–108)
CO2 SERPL-SCNC: 26 MMOL/L — SIGNIFICANT CHANGE UP (ref 22–31)
CREAT SERPL-MCNC: 1.27 MG/DL — SIGNIFICANT CHANGE UP (ref 0.5–1.3)
GLUCOSE SERPL-MCNC: 103 MG/DL — HIGH (ref 70–99)
HCT VFR BLD CALC: 39.4 % — SIGNIFICANT CHANGE UP (ref 39–50)
HGB BLD-MCNC: 14.4 G/DL — SIGNIFICANT CHANGE UP (ref 13–17)
MAGNESIUM SERPL-MCNC: 2.2 MG/DL — SIGNIFICANT CHANGE UP (ref 1.6–2.6)
MCHC RBC-ENTMCNC: 33.9 PG — SIGNIFICANT CHANGE UP (ref 27–34)
MCHC RBC-ENTMCNC: 36.6 GM/DL — HIGH (ref 32–36)
MCV RBC AUTO: 92.7 FL — SIGNIFICANT CHANGE UP (ref 80–100)
PHOSPHATE SERPL-MCNC: 2.9 MG/DL — SIGNIFICANT CHANGE UP (ref 2.5–4.5)
PLATELET # BLD AUTO: 116 K/UL — LOW (ref 150–400)
POTASSIUM SERPL-MCNC: 4.6 MMOL/L — SIGNIFICANT CHANGE UP (ref 3.5–5.3)
POTASSIUM SERPL-SCNC: 4.6 MMOL/L — SIGNIFICANT CHANGE UP (ref 3.5–5.3)
RBC # BLD: 4.25 M/UL — SIGNIFICANT CHANGE UP (ref 4.2–5.8)
RBC # FLD: 13.6 % — SIGNIFICANT CHANGE UP (ref 10.3–14.5)
SODIUM SERPL-SCNC: 143 MMOL/L — SIGNIFICANT CHANGE UP (ref 135–145)
WBC # BLD: 8.7 K/UL — SIGNIFICANT CHANGE UP (ref 3.8–10.5)
WBC # FLD AUTO: 8.7 K/UL — SIGNIFICANT CHANGE UP (ref 3.8–10.5)

## 2017-04-07 PROCEDURE — 86923 COMPATIBILITY TEST ELECTRIC: CPT

## 2017-04-07 PROCEDURE — 84132 ASSAY OF SERUM POTASSIUM: CPT

## 2017-04-07 PROCEDURE — 99238 HOSP IP/OBS DSCHRG MGMT 30/<: CPT

## 2017-04-07 PROCEDURE — C1769: CPT

## 2017-04-07 PROCEDURE — 85730 THROMBOPLASTIN TIME PARTIAL: CPT

## 2017-04-07 PROCEDURE — 85384 FIBRINOGEN ACTIVITY: CPT

## 2017-04-07 PROCEDURE — 83605 ASSAY OF LACTIC ACID: CPT

## 2017-04-07 PROCEDURE — C1887: CPT

## 2017-04-07 PROCEDURE — 86900 BLOOD TYPING SEROLOGIC ABO: CPT

## 2017-04-07 PROCEDURE — 85027 COMPLETE CBC AUTOMATED: CPT

## 2017-04-07 PROCEDURE — 93306 TTE W/DOPPLER COMPLETE: CPT | Mod: 26

## 2017-04-07 PROCEDURE — 86850 RBC ANTIBODY SCREEN: CPT

## 2017-04-07 PROCEDURE — 93306 TTE W/DOPPLER COMPLETE: CPT

## 2017-04-07 PROCEDURE — 87640 STAPH A DNA AMP PROBE: CPT

## 2017-04-07 PROCEDURE — 82947 ASSAY GLUCOSE BLOOD QUANT: CPT

## 2017-04-07 PROCEDURE — 71045 X-RAY EXAM CHEST 1 VIEW: CPT

## 2017-04-07 PROCEDURE — 93880 EXTRACRANIAL BILAT STUDY: CPT

## 2017-04-07 PROCEDURE — P9016: CPT

## 2017-04-07 PROCEDURE — 93355 ECHO TRANSESOPHAGEAL (TEE): CPT

## 2017-04-07 PROCEDURE — 87641 MR-STAPH DNA AMP PROBE: CPT

## 2017-04-07 PROCEDURE — 84295 ASSAY OF SERUM SODIUM: CPT

## 2017-04-07 PROCEDURE — 83036 HEMOGLOBIN GLYCOSYLATED A1C: CPT

## 2017-04-07 PROCEDURE — 71010: CPT | Mod: 26

## 2017-04-07 PROCEDURE — 83735 ASSAY OF MAGNESIUM: CPT

## 2017-04-07 PROCEDURE — 82803 BLOOD GASES ANY COMBINATION: CPT

## 2017-04-07 PROCEDURE — P9045: CPT

## 2017-04-07 PROCEDURE — 82330 ASSAY OF CALCIUM: CPT

## 2017-04-07 PROCEDURE — P9047: CPT

## 2017-04-07 PROCEDURE — 82553 CREATINE MB FRACTION: CPT

## 2017-04-07 PROCEDURE — 80048 BASIC METABOLIC PNL TOTAL CA: CPT

## 2017-04-07 PROCEDURE — 80053 COMPREHEN METABOLIC PANEL: CPT

## 2017-04-07 PROCEDURE — C1760: CPT

## 2017-04-07 PROCEDURE — 82435 ASSAY OF BLOOD CHLORIDE: CPT

## 2017-04-07 PROCEDURE — 86901 BLOOD TYPING SEROLOGIC RH(D): CPT

## 2017-04-07 PROCEDURE — C1889: CPT

## 2017-04-07 PROCEDURE — 82550 ASSAY OF CK (CPK): CPT

## 2017-04-07 PROCEDURE — L8699: CPT

## 2017-04-07 PROCEDURE — 85610 PROTHROMBIN TIME: CPT

## 2017-04-07 PROCEDURE — 93010 ELECTROCARDIOGRAM REPORT: CPT

## 2017-04-07 PROCEDURE — C1894: CPT

## 2017-04-07 PROCEDURE — 84484 ASSAY OF TROPONIN QUANT: CPT

## 2017-04-07 PROCEDURE — 93005 ELECTROCARDIOGRAM TRACING: CPT

## 2017-04-07 PROCEDURE — 85014 HEMATOCRIT: CPT

## 2017-04-07 PROCEDURE — 84100 ASSAY OF PHOSPHORUS: CPT

## 2017-04-07 PROCEDURE — 71046 X-RAY EXAM CHEST 2 VIEWS: CPT

## 2017-04-07 RX ORDER — ASPIRIN/CALCIUM CARB/MAGNESIUM 324 MG
81 TABLET ORAL DAILY
Qty: 0 | Refills: 0 | Status: DISCONTINUED | OUTPATIENT
Start: 2017-04-07 | End: 2017-04-07

## 2017-04-07 RX ORDER — METOPROLOL TARTRATE 50 MG
1 TABLET ORAL
Qty: 0 | Refills: 0 | COMMUNITY

## 2017-04-07 RX ORDER — ASPIRIN/CALCIUM CARB/MAGNESIUM 324 MG
1 TABLET ORAL
Qty: 0 | Refills: 0 | COMMUNITY

## 2017-04-07 RX ORDER — RIVAROXABAN 15 MG-20MG
10 KIT ORAL DAILY
Qty: 0 | Refills: 0 | Status: DISCONTINUED | OUTPATIENT
Start: 2017-04-07 | End: 2017-04-07

## 2017-04-07 RX ORDER — LEVOTHYROXINE SODIUM 125 MCG
1 TABLET ORAL
Qty: 30 | Refills: 0
Start: 2017-04-07 | End: 2017-05-07

## 2017-04-07 RX ORDER — RIVAROXABAN 15 MG-20MG
1 KIT ORAL
Qty: 0 | Refills: 0 | DISCHARGE
Start: 2017-04-07

## 2017-04-07 RX ORDER — ATORVASTATIN CALCIUM 80 MG/1
1 TABLET, FILM COATED ORAL
Qty: 30 | Refills: 0
Start: 2017-04-07 | End: 2017-05-07

## 2017-04-07 RX ORDER — PANTOPRAZOLE SODIUM 20 MG/1
40 TABLET, DELAYED RELEASE ORAL
Qty: 0 | Refills: 0 | Status: DISCONTINUED | OUTPATIENT
Start: 2017-04-07 | End: 2017-04-07

## 2017-04-07 RX ORDER — PANTOPRAZOLE SODIUM 20 MG/1
1 TABLET, DELAYED RELEASE ORAL
Qty: 10 | Refills: 0
Start: 2017-04-07 | End: 2017-04-17

## 2017-04-07 RX ORDER — ISOSORBIDE MONONITRATE 60 MG/1
1 TABLET, EXTENDED RELEASE ORAL
Qty: 0 | Refills: 0 | COMMUNITY

## 2017-04-07 RX ADMIN — PANTOPRAZOLE SODIUM 40 MILLIGRAM(S): 20 TABLET, DELAYED RELEASE ORAL at 11:22

## 2017-04-07 RX ADMIN — Medication 50 MICROGRAM(S): at 05:38

## 2017-04-07 RX ADMIN — Medication 81 MILLIGRAM(S): at 11:24

## 2017-04-07 RX ADMIN — RIVAROXABAN 10 MILLIGRAM(S): KIT at 11:24

## 2017-04-07 NOTE — DISCHARGE NOTE ADULT - HOSPITAL COURSE
82 yo male with PMH significant for CAD s/p 3 vessel CABG (1996) and stenting (2004), Afib (post-surgery, resolved), HTN, HLD, and Hypothyroidism,  AS c/o of exertional dyspnea & chest tightness, denies chest pain, diaphoresis work up done presents to PST for TAVR    s/p 4/5/17 TAVR via left transfemoral   postop bradycardia- no av true blocking agents as per Dr. Mary  4/7 echo done  xarelto and asa 81 qd as per structural heart team  4/7 discharge pt home as per Dr. Mary

## 2017-04-07 NOTE — DISCHARGE NOTE ADULT - ADDITIONAL INSTRUCTIONS
follow up with Cardiologist,  in 7 days and again in 30 days. CAll to schedule appointment. [phone] for repeat echocardiogram   follow up with cardiac surgeon, DR. Mary on april 18th at 11:15 am. Call to confirm appointment. [phone]

## 2017-04-07 NOTE — DISCHARGE NOTE ADULT - HOME CARE AGENCY
[facility]  [phone]  Visiting nurse will call 1-2 days after discharge to arrange visit. Please call agency with any questions or concerns

## 2017-04-07 NOTE — DISCHARGE NOTE ADULT - CARE PLAN
Principal Discharge DX:	S/P TAVR (transcatheter aortic valve replacement)  Goal:	complete recovery  Instructions for follow-up, activity and diet:	shower daily  weigh yourself daily  continue current prescriptions as ordered  increase activity as tolerated   no added salt; low fat; low cholesterol, low salt diet.   follow up with Cardiologist,  in 7 days and again in 30 days. CAll to schedule appointment. [phone] for repeat echocardiogram   follow up with cardiac surgeon, DR. Mary on april 18th at 11:15 am. Call to confirm appointment. [phone] Principal Discharge DX:	S/P TAVR (transcatheter aortic valve replacement)  Goal:	complete recovery  Instructions for follow-up, activity and diet:	shower daily  weigh yourself daily  continue current prescriptions as ordered  increase activity as tolerated   no added salt; low fat; low cholesterol, low salt diet.   follow up with Cardiologist,  in 7 days and again in 30 days. CAll to schedule appointment. [phone] for repeat echocardiogram   follow up with cardiac surgeon, DR. Mary on april 18th at 11:15 am. Call to confirm appointment. [phone]

## 2017-04-07 NOTE — DISCHARGE NOTE ADULT - INSTRUCTIONS
no added salt; low fat; low cholesterol, low salt diet Report signs and symptoms of distress, such as chest pain, shortness of breath  to your health-care provider promptly; if necessary call 911. Take medications as instructed. Keep follow-up appointments as scheduled.

## 2017-04-07 NOTE — DISCHARGE NOTE ADULT - MEDICATION SUMMARY - MEDICATIONS TO TAKE
I will START or STAY ON the medications listed below when I get home from the hospital:    aspirin 81 mg oral delayed release tablet  -- 1 tab(s) by mouth once a day  -- Indication: For blood thinner    rivaroxaban 10 mg oral tablet  -- 1 tab(s) by mouth once a day  -- Indication: For blood thinner    atorvastatin 40 mg oral tablet  -- 1 tab(s) by mouth once a day (at bedtime)  -- Indication: For cholesterol    saw palmetto oral capsule  --  by mouth stop today  -- Indication: For Herbal product    pantoprazole 40 mg oral delayed release tablet  -- 1 tab(s) by mouth once a day (before a meal)  -- Indication: For Antacid    levothyroxine 50 mcg (0.05 mg) oral tablet  -- 1 tab(s) by mouth once a day  -- Indication: For Hypothyroid

## 2017-04-07 NOTE — DISCHARGE NOTE ADULT - PATIENT PORTAL LINK FT
“You can access the Follow[facility] Patient Portal, offered by [facility], by registering with the following website: [email or web address]”

## 2017-04-07 NOTE — DISCHARGE NOTE ADULT - NS AS ACTIVITY OBS
Showering allowed/Do not make important decisions/Walking-Outdoors allowed/Return to Work/School allowed/Do not drive or operate machinery/Sex allowed/Stairs allowed/No Heavy lifting/straining/Walking-Indoors allowed

## 2017-04-07 NOTE — DISCHARGE NOTE ADULT - MEDICATION SUMMARY - MEDICATIONS TO STOP TAKING
I will STOP taking the medications listed below when I get home from the hospital:    Diovan 80 mg oral tablet  -- 1 tab(s) by mouth once a day    metoprolol succinate 50 mg oral tablet, extended release  -- 1 tab(s) by mouth once a day    Imdur 30 mg oral tablet, extended release  -- 1 tab(s) by mouth once a day (in the morning)

## 2017-04-07 NOTE — DISCHARGE NOTE ADULT - PLAN OF CARE
complete recovery shower daily  weigh yourself daily  continue current prescriptions as ordered  increase activity as tolerated   no added salt; low fat; low cholesterol, low salt diet.   follow up with Cardiologist,  in 7 days and again in 30 days. CAll to schedule appointment. [phone] for repeat echocardiogram   follow up with cardiac surgeon, DR. Mary on april 18th at 11:15 am. Call to confirm appointment. [phone]

## 2017-04-07 NOTE — DISCHARGE NOTE ADULT - CARE PROVIDER_API CALL
Lawrence Mary), Thoracic and Cardiac Surgery  [address] [place] [address]  Phone: [phone]  Fax: [phone]    Juan Whitmore), Cardiovascular Disease; Interventional Cardiology  [address] [place] [address]  Phone: [phone]  Fax: [phone]

## 2017-04-07 NOTE — DISCHARGE NOTE ADULT - CARE PROVIDERS DIRECT ADDRESSES
[email or web address]

## 2017-04-14 RX ORDER — RIVAROXABAN 10 MG/1
10 TABLET, FILM COATED ORAL DAILY
Refills: 0 | Status: ACTIVE | COMMUNITY

## 2017-04-14 RX ORDER — ISOSORBIDE MONONITRATE 30 MG
30 TABLET, EXTENDED RELEASE 24 HR ORAL DAILY
Refills: 0 | Status: DISCONTINUED | COMMUNITY
End: 2017-04-14

## 2017-04-14 RX ORDER — METOPROLOL SUCCINATE 50 MG/1
50 TABLET, EXTENDED RELEASE ORAL DAILY
Refills: 0 | Status: DISCONTINUED | COMMUNITY
End: 2017-04-14

## 2017-04-18 ENCOUNTER — APPOINTMENT (OUTPATIENT)
Dept: CARDIOTHORACIC SURGERY | Facility: CLINIC | Age: 82
End: 2017-04-18

## 2017-04-18 ENCOUNTER — NON-APPOINTMENT (OUTPATIENT)
Age: 82
End: 2017-04-18

## 2017-04-18 VITALS
HEART RATE: 72 BPM | RESPIRATION RATE: 15 BRPM | SYSTOLIC BLOOD PRESSURE: 138 MMHG | HEIGHT: 69 IN | TEMPERATURE: 98.2 F | DIASTOLIC BLOOD PRESSURE: 96 MMHG | WEIGHT: 203 LBS | OXYGEN SATURATION: 98 % | BODY MASS INDEX: 30.07 KG/M2

## 2017-04-18 VITALS — DIASTOLIC BLOOD PRESSURE: 85 MMHG | SYSTOLIC BLOOD PRESSURE: 134 MMHG

## 2017-04-18 RX ORDER — NITROGLYCERIN 0.4 MG/1
0.4 TABLET SUBLINGUAL
Qty: 25 | Refills: 0 | Status: DISCONTINUED | COMMUNITY
Start: 2016-10-19

## 2017-05-12 ENCOUNTER — APPOINTMENT (OUTPATIENT)
Dept: CARDIOLOGY | Facility: CLINIC | Age: 82
End: 2017-05-12

## 2017-05-15 ENCOUNTER — APPOINTMENT (OUTPATIENT)
Dept: CARDIOLOGY | Facility: CLINIC | Age: 82
End: 2017-05-15

## 2017-05-15 ENCOUNTER — APPOINTMENT (OUTPATIENT)
Dept: CV DIAGNOSITCS | Facility: HOSPITAL | Age: 82
End: 2017-05-15

## 2017-05-15 ENCOUNTER — OUTPATIENT (OUTPATIENT)
Dept: OUTPATIENT SERVICES | Facility: HOSPITAL | Age: 82
LOS: 1 days | End: 2017-05-15
Payer: MEDICARE

## 2017-05-15 ENCOUNTER — NON-APPOINTMENT (OUTPATIENT)
Age: 82
End: 2017-05-15

## 2017-05-15 VITALS
SYSTOLIC BLOOD PRESSURE: 158 MMHG | BODY MASS INDEX: 30.07 KG/M2 | HEIGHT: 69 IN | HEART RATE: 57 BPM | DIASTOLIC BLOOD PRESSURE: 97 MMHG | WEIGHT: 203 LBS | OXYGEN SATURATION: 97 %

## 2017-05-15 DIAGNOSIS — M25.422 EFFUSION, LEFT ELBOW: ICD-10-CM

## 2017-05-15 DIAGNOSIS — I35.1 NONRHEUMATIC AORTIC (VALVE) INSUFFICIENCY: ICD-10-CM

## 2017-05-15 DIAGNOSIS — Z96.653 PRESENCE OF ARTIFICIAL KNEE JOINT, BILATERAL: Chronic | ICD-10-CM

## 2017-05-15 DIAGNOSIS — Z95.1 PRESENCE OF AORTOCORONARY BYPASS GRAFT: Chronic | ICD-10-CM

## 2017-05-15 DIAGNOSIS — Z92.89 PERSONAL HISTORY OF OTHER MEDICAL TREATMENT: Chronic | ICD-10-CM

## 2017-05-15 PROCEDURE — 93306 TTE W/DOPPLER COMPLETE: CPT | Mod: 26

## 2017-05-15 PROCEDURE — 93306 TTE W/DOPPLER COMPLETE: CPT

## 2017-05-16 ENCOUNTER — TRANSCRIPTION ENCOUNTER (OUTPATIENT)
Age: 82
End: 2017-05-16

## 2017-06-26 ENCOUNTER — NON-APPOINTMENT (OUTPATIENT)
Age: 82
End: 2017-06-26

## 2017-06-26 ENCOUNTER — APPOINTMENT (OUTPATIENT)
Dept: CARDIOLOGY | Facility: CLINIC | Age: 82
End: 2017-06-26

## 2017-06-26 VITALS
DIASTOLIC BLOOD PRESSURE: 84 MMHG | HEART RATE: 64 BPM | SYSTOLIC BLOOD PRESSURE: 137 MMHG | WEIGHT: 203 LBS | OXYGEN SATURATION: 96 % | BODY MASS INDEX: 30.07 KG/M2 | HEIGHT: 69 IN

## 2017-06-26 DIAGNOSIS — I35.0 NONRHEUMATIC AORTIC (VALVE) STENOSIS: ICD-10-CM

## 2017-06-26 RX ORDER — AMLODIPINE BESYLATE 2.5 MG/1
2.5 TABLET ORAL DAILY
Qty: 30 | Refills: 1 | Status: ACTIVE | COMMUNITY
Start: 2017-06-26

## 2017-06-30 ENCOUNTER — APPOINTMENT (OUTPATIENT)
Dept: CARDIOLOGY | Facility: CLINIC | Age: 82
End: 2017-06-30

## 2018-04-06 ENCOUNTER — APPOINTMENT (OUTPATIENT)
Dept: CARDIOTHORACIC SURGERY | Facility: CLINIC | Age: 83
End: 2018-04-06
Payer: MEDICARE

## 2018-04-06 ENCOUNTER — APPOINTMENT (OUTPATIENT)
Dept: CV DIAGNOSITCS | Facility: HOSPITAL | Age: 83
End: 2018-04-06

## 2018-04-06 ENCOUNTER — OUTPATIENT (OUTPATIENT)
Dept: OUTPATIENT SERVICES | Facility: HOSPITAL | Age: 83
LOS: 1 days | End: 2018-04-06
Payer: MEDICARE

## 2018-04-06 VITALS
HEART RATE: 84 BPM | OXYGEN SATURATION: 98 % | DIASTOLIC BLOOD PRESSURE: 86 MMHG | HEIGHT: 69 IN | SYSTOLIC BLOOD PRESSURE: 152 MMHG | RESPIRATION RATE: 16 BRPM | WEIGHT: 208 LBS | BODY MASS INDEX: 30.81 KG/M2 | TEMPERATURE: 97.9 F

## 2018-04-06 DIAGNOSIS — Z95.1 PRESENCE OF AORTOCORONARY BYPASS GRAFT: Chronic | ICD-10-CM

## 2018-04-06 DIAGNOSIS — Z95.2 PRESENCE OF PROSTHETIC HEART VALVE: ICD-10-CM

## 2018-04-06 DIAGNOSIS — Z96.653 PRESENCE OF ARTIFICIAL KNEE JOINT, BILATERAL: Chronic | ICD-10-CM

## 2018-04-06 DIAGNOSIS — Z92.89 PERSONAL HISTORY OF OTHER MEDICAL TREATMENT: Chronic | ICD-10-CM

## 2018-04-06 PROCEDURE — 93306 TTE W/DOPPLER COMPLETE: CPT | Mod: 26

## 2018-04-06 PROCEDURE — 93000 ELECTROCARDIOGRAM COMPLETE: CPT

## 2018-04-06 PROCEDURE — 99215 OFFICE O/P EST HI 40 MIN: CPT | Mod: 25

## 2018-04-06 PROCEDURE — 93306 TTE W/DOPPLER COMPLETE: CPT

## 2018-04-06 RX ORDER — PANTOPRAZOLE SODIUM 40 MG/1
40 TABLET, DELAYED RELEASE ORAL DAILY
Qty: 30 | Refills: 0 | Status: COMPLETED | COMMUNITY
End: 2018-04-06

## 2018-04-06 RX ORDER — ASPIRIN 81 MG
81 TABLET, DELAYED RELEASE (ENTERIC COATED) ORAL DAILY
Refills: 0 | Status: COMPLETED | COMMUNITY
End: 2018-04-06

## 2018-04-06 RX ORDER — ATORVASTATIN CALCIUM 40 MG/1
40 TABLET, FILM COATED ORAL
Refills: 0 | Status: COMPLETED | COMMUNITY
End: 2018-04-06

## 2018-06-27 NOTE — H&P PST ADULT - MEDICATION HERBAL TYPE, PROFILE
derrell delacruz/janett liang 1/29/14 Attending Contribution to Care: I, Aditi Jacobson, performed the initial face to face bedside interview with this patient regarding history of present illness, review of symptoms and relevant past medical, social and family history.  I completed an independent physical examination.  I was the initial provider who evaluated this patient and the history, physical, and MDM reflect this intial assessment. I have signed out the follow up of any pending tests after the original (i.e. labs, radiological studies) to the ACP with instructions to review any with instructions to review any concerning findings to me prior to discharge.  I have communicated the patient’s plan of care and disposition with the ACP.

## 2018-07-10 ENCOUNTER — RX RENEWAL (OUTPATIENT)
Age: 83
End: 2018-07-10

## 2018-07-16 RX ORDER — VALSARTAN 160 MG/1
160 TABLET, COATED ORAL
Qty: 90 | Refills: 3 | Status: ACTIVE | COMMUNITY
Start: 2018-07-10 | End: 1900-01-01

## 2018-08-20 PROBLEM — M54.5 LOW BACK PAIN: Chronic | Status: ACTIVE | Noted: 2017-04-03

## 2018-08-20 PROBLEM — I35.0 NONRHEUMATIC AORTIC (VALVE) STENOSIS: Chronic | Status: ACTIVE | Noted: 2017-04-03

## 2018-08-20 PROBLEM — H91.90 UNSPECIFIED HEARING LOSS, UNSPECIFIED EAR: Chronic | Status: ACTIVE | Noted: 2017-04-03

## 2018-08-20 PROBLEM — Z95.5 PRESENCE OF CORONARY ANGIOPLASTY IMPLANT AND GRAFT: Chronic | Status: ACTIVE | Noted: 2017-04-03

## 2018-08-29 ENCOUNTER — APPOINTMENT (OUTPATIENT)
Dept: CARDIOTHORACIC SURGERY | Facility: CLINIC | Age: 83
End: 2018-08-29
Payer: MEDICARE

## 2018-08-29 VITALS
BODY MASS INDEX: 29.77 KG/M2 | SYSTOLIC BLOOD PRESSURE: 137 MMHG | HEART RATE: 59 BPM | TEMPERATURE: 97.7 F | HEIGHT: 69 IN | RESPIRATION RATE: 14 BRPM | WEIGHT: 201 LBS | OXYGEN SATURATION: 97 % | DIASTOLIC BLOOD PRESSURE: 73 MMHG

## 2018-08-29 DIAGNOSIS — I10 ESSENTIAL (PRIMARY) HYPERTENSION: ICD-10-CM

## 2018-08-29 DIAGNOSIS — Z95.2 PRESENCE OF PROSTHETIC HEART VALVE: ICD-10-CM

## 2018-08-29 DIAGNOSIS — Z86.79 PERSONAL HISTORY OF OTHER DISEASES OF THE CIRCULATORY SYSTEM: ICD-10-CM

## 2018-08-29 PROCEDURE — 99214 OFFICE O/P EST MOD 30 MIN: CPT | Mod: 25

## 2018-08-29 PROCEDURE — 93000 ELECTROCARDIOGRAM COMPLETE: CPT

## 2018-08-29 RX ORDER — ATORVASTATIN CALCIUM 80 MG/1
80 TABLET, FILM COATED ORAL
Qty: 30 | Refills: 0 | Status: ACTIVE | COMMUNITY
Start: 2018-08-29

## 2018-08-29 RX ORDER — TIZANIDINE HYDROCHLORIDE 4 MG/1
4 CAPSULE ORAL AT BEDTIME
Refills: 0 | Status: ACTIVE | COMMUNITY
Start: 2018-08-29

## 2018-08-29 RX ORDER — VALSARTAN 160 MG/1
160 TABLET, COATED ORAL
Refills: 0 | Status: COMPLETED | COMMUNITY
End: 2018-08-29

## 2018-10-05 ENCOUNTER — APPOINTMENT (OUTPATIENT)
Dept: CARDIOTHORACIC SURGERY | Facility: CLINIC | Age: 83
End: 2018-10-05

## 2019-10-03 ENCOUNTER — INPATIENT (INPATIENT)
Facility: HOSPITAL | Age: 84
LOS: 0 days | Discharge: HOME | DRG: 310 | End: 2019-10-04
Attending: STUDENT IN AN ORGANIZED HEALTH CARE EDUCATION/TRAINING PROGRAM | Admitting: STUDENT IN AN ORGANIZED HEALTH CARE EDUCATION/TRAINING PROGRAM
Payer: COMMERCIAL

## 2019-10-03 VITALS
RESPIRATION RATE: 18 BRPM | SYSTOLIC BLOOD PRESSURE: 126 MMHG | HEART RATE: 88 BPM | HEIGHT: 69 IN | WEIGHT: 203.05 LBS | DIASTOLIC BLOOD PRESSURE: 66 MMHG | OXYGEN SATURATION: 96 % | TEMPERATURE: 98 F

## 2019-10-03 DIAGNOSIS — Z96.653 PRESENCE OF ARTIFICIAL KNEE JOINT, BILATERAL: Chronic | ICD-10-CM

## 2019-10-03 DIAGNOSIS — I48.91 UNSPECIFIED ATRIAL FIBRILLATION: ICD-10-CM

## 2019-10-03 DIAGNOSIS — I35.0 NONRHEUMATIC AORTIC (VALVE) STENOSIS: ICD-10-CM

## 2019-10-03 DIAGNOSIS — Z29.9 ENCOUNTER FOR PROPHYLACTIC MEASURES, UNSPECIFIED: ICD-10-CM

## 2019-10-03 DIAGNOSIS — E03.9 HYPOTHYROIDISM, UNSPECIFIED: ICD-10-CM

## 2019-10-03 DIAGNOSIS — Z92.89 PERSONAL HISTORY OF OTHER MEDICAL TREATMENT: Chronic | ICD-10-CM

## 2019-10-03 DIAGNOSIS — Z95.1 PRESENCE OF AORTOCORONARY BYPASS GRAFT: Chronic | ICD-10-CM

## 2019-10-03 DIAGNOSIS — R07.9 CHEST PAIN, UNSPECIFIED: ICD-10-CM

## 2019-10-03 DIAGNOSIS — E78.5 HYPERLIPIDEMIA, UNSPECIFIED: ICD-10-CM

## 2019-10-03 LAB
ALBUMIN SERPL ELPH-MCNC: 4 G/DL — SIGNIFICANT CHANGE UP (ref 3.3–5)
ALP SERPL-CCNC: 92 U/L — SIGNIFICANT CHANGE UP (ref 40–120)
ALT FLD-CCNC: 16 U/L — SIGNIFICANT CHANGE UP (ref 10–45)
ANION GAP SERPL CALC-SCNC: 9 MMOL/L — SIGNIFICANT CHANGE UP (ref 5–17)
APTT BLD: 29.6 SEC — SIGNIFICANT CHANGE UP (ref 27.5–36.3)
AST SERPL-CCNC: 14 U/L — SIGNIFICANT CHANGE UP (ref 10–40)
BASE EXCESS BLDV CALC-SCNC: 2.8 MMOL/L — HIGH (ref -2–2)
BILIRUB SERPL-MCNC: 0.5 MG/DL — SIGNIFICANT CHANGE UP (ref 0.2–1.2)
BUN SERPL-MCNC: 18 MG/DL — SIGNIFICANT CHANGE UP (ref 7–23)
CA-I SERPL-SCNC: 1.19 MMOL/L — SIGNIFICANT CHANGE UP (ref 1.12–1.3)
CALCIUM SERPL-MCNC: 9.5 MG/DL — SIGNIFICANT CHANGE UP (ref 8.4–10.5)
CHLORIDE BLDV-SCNC: 108 MMOL/L — SIGNIFICANT CHANGE UP (ref 96–108)
CHLORIDE SERPL-SCNC: 104 MMOL/L — SIGNIFICANT CHANGE UP (ref 96–108)
CK MB BLD-MCNC: 3 % — SIGNIFICANT CHANGE UP (ref 0–3.5)
CK MB CFR SERPL CALC: 3.1 NG/ML — SIGNIFICANT CHANGE UP (ref 0–6.7)
CK SERPL-CCNC: 102 U/L — SIGNIFICANT CHANGE UP (ref 30–200)
CO2 BLDV-SCNC: 30 MMOL/L — SIGNIFICANT CHANGE UP (ref 22–30)
CO2 SERPL-SCNC: 26 MMOL/L — SIGNIFICANT CHANGE UP (ref 22–31)
CREAT SERPL-MCNC: 1.15 MG/DL — SIGNIFICANT CHANGE UP (ref 0.5–1.3)
GAS PNL BLDV: 137 MMOL/L — SIGNIFICANT CHANGE UP (ref 135–145)
GAS PNL BLDV: SIGNIFICANT CHANGE UP
GAS PNL BLDV: SIGNIFICANT CHANGE UP
GLUCOSE BLDV-MCNC: 82 MG/DL — SIGNIFICANT CHANGE UP (ref 70–99)
GLUCOSE SERPL-MCNC: 87 MG/DL — SIGNIFICANT CHANGE UP (ref 70–99)
HCO3 BLDV-SCNC: 29 MMOL/L — SIGNIFICANT CHANGE UP (ref 21–29)
HCT VFR BLD CALC: 42.7 % — SIGNIFICANT CHANGE UP (ref 39–50)
HCT VFR BLDA CALC: 47 % — SIGNIFICANT CHANGE UP (ref 39–50)
HGB BLD CALC-MCNC: 15.3 G/DL — SIGNIFICANT CHANGE UP (ref 13–17)
HGB BLD-MCNC: 15 G/DL — SIGNIFICANT CHANGE UP (ref 13–17)
INR BLD: 0.93 RATIO — SIGNIFICANT CHANGE UP (ref 0.88–1.16)
LACTATE BLDV-MCNC: 2 MMOL/L — SIGNIFICANT CHANGE UP (ref 0.7–2)
LIDOCAIN IGE QN: 35 U/L — SIGNIFICANT CHANGE UP (ref 7–60)
MCHC RBC-ENTMCNC: 30.5 PG — SIGNIFICANT CHANGE UP (ref 27–34)
MCHC RBC-ENTMCNC: 35.1 GM/DL — SIGNIFICANT CHANGE UP (ref 32–36)
MCV RBC AUTO: 86.8 FL — SIGNIFICANT CHANGE UP (ref 80–100)
NRBC # BLD: 0 /100 WBCS — SIGNIFICANT CHANGE UP (ref 0–0)
NT-PROBNP SERPL-SCNC: 197 PG/ML — SIGNIFICANT CHANGE UP (ref 0–300)
PCO2 BLDV: 50 MMHG — SIGNIFICANT CHANGE UP (ref 35–50)
PH BLDV: 7.37 — SIGNIFICANT CHANGE UP (ref 7.35–7.45)
PLATELET # BLD AUTO: 268 K/UL — SIGNIFICANT CHANGE UP (ref 150–400)
PO2 BLDV: 26 MMHG — SIGNIFICANT CHANGE UP (ref 25–45)
POTASSIUM BLDV-SCNC: 4.3 MMOL/L — SIGNIFICANT CHANGE UP (ref 3.5–5.3)
POTASSIUM SERPL-MCNC: 4.3 MMOL/L — SIGNIFICANT CHANGE UP (ref 3.5–5.3)
POTASSIUM SERPL-SCNC: 4.3 MMOL/L — SIGNIFICANT CHANGE UP (ref 3.5–5.3)
PROT SERPL-MCNC: 6.9 G/DL — SIGNIFICANT CHANGE UP (ref 6–8.3)
PROTHROM AB SERPL-ACNC: 10.6 SEC — SIGNIFICANT CHANGE UP (ref 10–12.9)
RBC # BLD: 4.92 M/UL — SIGNIFICANT CHANGE UP (ref 4.2–5.8)
RBC # FLD: 13.9 % — SIGNIFICANT CHANGE UP (ref 10.3–14.5)
SAO2 % BLDV: 46 % — LOW (ref 67–88)
SODIUM SERPL-SCNC: 139 MMOL/L — SIGNIFICANT CHANGE UP (ref 135–145)
T3FREE SERPL-MCNC: 2.72 PG/ML — SIGNIFICANT CHANGE UP (ref 1.8–4.6)
T4 FREE SERPL-MCNC: 1.3 NG/DL — SIGNIFICANT CHANGE UP (ref 0.9–1.8)
TROPONIN T, HIGH SENSITIVITY RESULT: 42 NG/L — SIGNIFICANT CHANGE UP (ref 0–51)
TROPONIN T, HIGH SENSITIVITY RESULT: 63 NG/L — HIGH (ref 0–51)
TSH SERPL-MCNC: 3.8 UIU/ML — SIGNIFICANT CHANGE UP (ref 0.27–4.2)
WBC # BLD: 10.04 K/UL — SIGNIFICANT CHANGE UP (ref 3.8–10.5)
WBC # FLD AUTO: 10.04 K/UL — SIGNIFICANT CHANGE UP (ref 3.8–10.5)

## 2019-10-03 PROCEDURE — 99285 EMERGENCY DEPT VISIT HI MDM: CPT

## 2019-10-03 PROCEDURE — 99223 1ST HOSP IP/OBS HIGH 75: CPT | Mod: AI

## 2019-10-03 PROCEDURE — 71045 X-RAY EXAM CHEST 1 VIEW: CPT | Mod: 26

## 2019-10-03 PROCEDURE — 93010 ELECTROCARDIOGRAM REPORT: CPT

## 2019-10-03 RX ORDER — METOPROLOL TARTRATE 50 MG
25 TABLET ORAL
Refills: 0 | Status: DISCONTINUED | OUTPATIENT
Start: 2019-10-03 | End: 2019-10-04

## 2019-10-03 RX ORDER — INFLUENZA VIRUS VACCINE 15; 15; 15; 15 UG/.5ML; UG/.5ML; UG/.5ML; UG/.5ML
0.5 SUSPENSION INTRAMUSCULAR ONCE
Refills: 0 | Status: DISCONTINUED | OUTPATIENT
Start: 2019-10-03 | End: 2019-10-04

## 2019-10-03 RX ORDER — LEVOTHYROXINE SODIUM 125 MCG
50 TABLET ORAL DAILY
Refills: 0 | Status: DISCONTINUED | OUTPATIENT
Start: 2019-10-03 | End: 2019-10-04

## 2019-10-03 RX ORDER — VALSARTAN 80 MG/1
160 TABLET ORAL DAILY
Refills: 0 | Status: DISCONTINUED | OUTPATIENT
Start: 2019-10-03 | End: 2019-10-03

## 2019-10-03 RX ORDER — ASPIRIN/CALCIUM CARB/MAGNESIUM 324 MG
162 TABLET ORAL ONCE
Refills: 0 | Status: COMPLETED | OUTPATIENT
Start: 2019-10-03 | End: 2019-10-03

## 2019-10-03 RX ORDER — AMLODIPINE BESYLATE 2.5 MG/1
2.5 TABLET ORAL DAILY
Refills: 0 | Status: DISCONTINUED | OUTPATIENT
Start: 2019-10-03 | End: 2019-10-04

## 2019-10-03 RX ORDER — ATORVASTATIN CALCIUM 80 MG/1
40 TABLET, FILM COATED ORAL AT BEDTIME
Refills: 0 | Status: DISCONTINUED | OUTPATIENT
Start: 2019-10-03 | End: 2019-10-04

## 2019-10-03 RX ORDER — ACETAMINOPHEN 500 MG
650 TABLET ORAL EVERY 6 HOURS
Refills: 0 | Status: DISCONTINUED | OUTPATIENT
Start: 2019-10-03 | End: 2019-10-04

## 2019-10-03 RX ORDER — ASPIRIN/CALCIUM CARB/MAGNESIUM 324 MG
81 TABLET ORAL DAILY
Refills: 0 | Status: DISCONTINUED | OUTPATIENT
Start: 2019-10-03 | End: 2019-10-04

## 2019-10-03 RX ADMIN — Medication 25 MILLIGRAM(S): at 21:05

## 2019-10-03 RX ADMIN — Medication 162 MILLIGRAM(S): at 14:53

## 2019-10-03 NOTE — H&P ADULT - HISTORY OF PRESENT ILLNESS
83 y/o m w/ PMH of AS s/p TAVR, CAD s/p DEWAYNE and CABG, hypothyroidism, HTN, HLD p/w chest pain this morning that lasted for 2 minutes, while at rest, substernal, pressure like tightening in quality, remitted on its own without any medication.  Pt had diaphoresis, and di not appear well according ot his wife.  Last time pt had these symptoms was before his TAVR in 2017.  Pt does not report any recent heartburn or changeS in food or po intake.  Pt went to his PMD, where he was found to have low SBP to 80's and SVT to 125, and was sent into [facility] for further evaluation.

## 2019-10-03 NOTE — ED PROVIDER NOTE - ATTENDING CONTRIBUTION TO CARE
84M, pmh htn, hld, cad s/p stents, s/p cabg, tavr, presents s/p episode of chest pain, diaphoresis earlier this morning. Pain substernal, non-radiating, unprovoked. Pt was seen in cardiologist's office found to haeve ekg changes and had episode of hypotension, tachycardia, sent to ED. Pt currently denies any complaints. Denies sob, f/c, n/v/d, abd pain.    PE: NAD, NCAT, MMM, Trachea midline, Normal conjunctiva, lungs CTAB, S1/S2 RRR, Normal perfusion, 2+ radial pulses bilat, Abdomen Soft, NTND, No rebound/guarding, No LE edema, No deformity of extremities, No rashes,  No focal motor or sensory deficits.     EKG does reveal diffuse std, new for patient. HD stable. Asymptomatic. Concerning for potential ACS. Obtain labs, cxr, plan to admit for further workup. - Augustin Cardoza MD

## 2019-10-03 NOTE — ED PROVIDER NOTE - OBJECTIVE STATEMENT
84  year old male with pmhx HTN, HLD, CAD s/p stents and CABG, TAVR presents to ED c/o episode of chest pain associated with diaphoresis this morning. Patient reports he had an episode of chest pain this morning described as substernal pressure lasting 2 minutes associated with diaphoresis. Patients wife states pt "did not look good" and they went to pt cards office. While in office pt sBP 80s and was tachycardic and was found to have abnormal EKG in office and was sent to ED. Patient currently pain free. Denies ha, dizziness, sob, del toro, palpitations, and pain, n/v/d

## 2019-10-03 NOTE — H&P ADULT - NSICDXPASTMEDICALHX_GEN_ALL_CORE_FT
PAST MEDICAL HISTORY:  Aortic stenosis     Atrial fibrillation post-surgery, resolved    BPH (benign prostatic hyperplasia)     CAD (coronary artery disease)     Carpal tunnel syndrome of right wrist     [place] (hard of hearing) hearing aid bilateral    HTN (hypertension)     Hyperlipidemia     Hypothyroidism     Inguinal hernia, bilateral     Low back pain     Stented coronary artery 2004

## 2019-10-03 NOTE — H&P ADULT - PROBLEM SELECTOR PLAN 2
Pt has h/o AS, s/p TAVR  - 2d echo ordered; last 2d echo was in Jan of 2019, 10 months ago; given rising cardiac enzymes, and arrhythmia found earlier today, will check 2d echo

## 2019-10-03 NOTE — H&P ADULT - NSICDXFAMILYHX_GEN_ALL_CORE_FT
FAMILY HISTORY:  Family history of hypertension  Family history of myocardial infarction  Family history of stroke

## 2019-10-03 NOTE — ED PROVIDER NOTE - PHYSICAL EXAMINATION
CONSTITUTIONAL: Patient is awake, alert and oriented x 3. Patient is well appearing and in no acute distress.  HEAD: NCAT,   NECK: supple,   LUNGS: CTA B/L,  HEART: RRR.+S1S2 no murmurs,   ABDOMEN: Soft nd/nt+bs no rebound or guarding.   EXTREMITY: no edema or calf tenderness b/l, FROM upper and lower ext b/l  SKIN: with no rash or lesions.   NEURO: No focal deficits

## 2019-10-03 NOTE — ED ADULT NURSE NOTE - OBJECTIVE STATEMENT
Patient is a 84 y male presenting to the ED via EMS from Cardiologist's office with c/o chest pain. Patient A&Ox4. Patient reports waking up today with chest pain and SOB. Reports substernal chest pain, tightness and diaphoresis. Went to see Cardiologist, at the office, patient's heart rate was in the 120s and was hypotensive with a systolic BP in the 80s. Upon arrival to [facility], patient denies any chest pain or discomfort, SOB, dizziness, abdominal pain. ECG was performed at bedside. Patient placed on cardiac monitor. Heart sounds heard upon auscultation. Lung sounds clear bilaterally. Abdomen is obese, soft, and non-tender upon palpatio. PMH of CAD, CABG TAVR.

## 2019-10-03 NOTE — H&P ADULT - NSHPLABSRESULTS_GEN_ALL_CORE
15.0   10.04 )-----------( 268      ( 03 Oct 2019 15:09 )             42.7     10-03    139  |  104  |  18  ----------------------------<  87  4.3   |  26  |  1.15    Ca    9.5      03 Oct 2019 15:09    TPro  6.9  /  Alb  4.0  /  TBili  0.5  /  DBili  x   /  AST  14  /  ALT  16  /  AlkPhos  92  10-03      trop T = 63  <-- 42    CXR: personally reviewed by me: clear lungs    EKG: inpt at [facility] - sinus with 1 degree AV block, RBBB, T wave inversion V2-v5 (old EKG chnages); in PMD office today 10/3 SVT to 125 bpm, old EKG on 7/19 - shows RBBB, and T wave inversions in v2-v5, these changes are not new

## 2019-10-03 NOTE — PROVIDER CONTACT NOTE (CRITICAL VALUE NOTIFICATION) - ACTION/TREATMENT ORDERED:
MD notified and aware, MD ordered further testing CKMB, CPK, TSH FT3, FT4. will continue to monitor and maintain safety.

## 2019-10-03 NOTE — H&P ADULT - NSICDXPASTSURGICALHX_GEN_ALL_CORE_FT
PAST SURGICAL HISTORY:  History of carpal tunnel surgery 2014    History of knee replacement, total, bilateral right 2016  left 2011    S/P CABG (coronary artery bypass graft) 07/1996 CABG x3    S/P coronary angioplasty 2004 drug eluting    S/P hernia repair inquinal 2005    S/P tonsillectomy at age 5 years old

## 2019-10-03 NOTE — ED PROVIDER NOTE - PMH
Aortic stenosis    Atrial fibrillation  post-surgery, resolved  BPH (benign prostatic hyperplasia)    CAD (coronary artery disease)    Carpal tunnel syndrome of right wrist    [place] (hard of hearing)  hearing aid bilateral  HTN (hypertension)    Hyperlipidemia    Hypothyroidism    Inguinal hernia, bilateral    Low back pain    Stented coronary artery  2004

## 2019-10-03 NOTE — H&P ADULT - PROBLEM SELECTOR PLAN 3
Pt was found to be hypotensive to SBP 80's at Dr. Dieter Grajeda's office earlier today; will hold home valsartan, cont norvasc for now  - cards consulted - will f/u recs regarding optimal medical management  - currently pt is in NSR and BP's stable, but if pt has svt ovenright on tele, may consider role of BB Pt was found to be hypotensive to SBP 80's at Dr. Dieter Grajeda's office earlier today; will hold home valsartan, cont norvasc for now  - care discussed with cardiologist, he recommended initiating lopressor 25mg po bid, given svt earlier today

## 2019-10-03 NOTE — H&P ADULT - NSHPREVIEWOFSYSTEMS_GEN_ALL_CORE
CARDIOVASCULAR: + CHEST PAIN, DIAPHORESIS, no palpitations    CONSTITUTIONAL: No weakness, fevers or chills  EYES/ENT: No visual changes, no throat pain   RESPIRATORY: No cough, wheezing, hemoptysis; No shortness of breath  GASTROINTESTINAL: No abdominal, nausea, vomiting, or hematemesis; No diarrhea or constipation. No melena or hematochezia.  GENITOURINARY: No dysuria, frequency or hematuria  NEUROLOGICAL: No dizziness, numbness, or weakness  SKIN: No itching, burning, rashes, or lesions  ALLERGY: No seasonal allergies, no rhinorrhea  MUSCULOSKELETAL: No arthralgias, no myalgias

## 2019-10-03 NOTE — H&P ADULT - NSHPPHYSICALEXAM_GEN_ALL_CORE
VITAL SIGNS:    Vital Signs Last 24 Hrs  T(C): 36.5 (03 Oct 2019 18:47), Max: 36.8 (03 Oct 2019 14:04)  T(F): 97.7 (03 Oct 2019 18:47), Max: 98.3 (03 Oct 2019 14:04)  HR: 62 (03 Oct 2019 18:47) (60 - 88)  BP: 125/81 (03 Oct 2019 18:47) (116/81 - 126/66)  BP(mean): --  RR: 16 (03 Oct 2019 18:47) (16 - 18)  SpO2: 96% (03 Oct 2019 18:47) (96% - 96%)    PHYSICAL EXAM:     GENERAL: no acute distress  HEENT: NC/AT, EOMI, neck supple, MMM  RESPIRATORY: LCTAB/L, no rhonchi, rales, or wheezing  CARDIOVASCULAR: RRR, no murmurs, gallops, rubs  ABDOMINAL: soft, non-tender, non-distended, positive bowel sounds   EXTREMITIES: no clubbing, cyanosis, or edema  NEUROLOGICAL: alert and oriented x 3, non-focal  SKIN: no rashes or lesions   MUSCULOSKELETAL: no gross joint deformity

## 2019-10-03 NOTE — ED ADULT NURSE NOTE - PMH
Aortic stenosis    Atrial fibrillation  post-surgery, resolved  BPH (benign prostatic hyperplasia)    CAD (coronary artery disease)    Carpal tunnel syndrome of right wrist    [place] (hard of hearing)  hearing aid bilateral  HTN (hypertension)    Hyperlipidemia    Hypothyroidism    Inguinal hernia, bilateral    Low back pain    Stented coronary artery  2004

## 2019-10-03 NOTE — H&P ADULT - PROBLEM SELECTOR PLAN 1
Pt had chest pressure earlier today while at rest, remitted on its own after 2 min  - ekg here inpt shows T wave inversions v2-v5 and RBBB, which are old; but EKG earlier at Dr. Grajeda's office showed SVT to 125 bpm along with SBP in 80's; trop T went from 42 to 63 here  - cards consult called, will f/u recs regarding management   - will check TFTs Pt had chest pressure earlier today while at rest, remitted on its own after 2 min  - ekg here inpt shows T wave inversions v2-v5 and RBBB, which are old; but EKG earlier at Dr. Grajeda's office showed SVT to 125 bpm along with SBP in 80's; trop T elevated from 42 to 63 here; r/o NSTEMI  - cards consult called, will f/u recs regarding management  - monitor on tele   - will check TFTs  - 2d echo Pt had chest pressure earlier today while at rest, remitted on its own after 2 min  - ekg here inpt shows T wave inversions v2-v5 and RBBB, which are old; but EKG earlier at Dr. Grajeda's office showed SVT to 125 bpm along with SBP in 80's; trop T elevated from 42 to 63 here; r/o NSTEMI  - pt is asymptomatic; care discussed with Cardiologist: recheck cardiac enzymes in am, recheck EKG in am, if enzymes cont to rise, will consider cath tomorrow, start lopressor 25 mg po bid, 1st dose tonight for SVT seen earlier today at [facility] office  - monitor on tele   - will check TFTs  - 2d echo Pt had chest pressure earlier today while at rest, remitted on its own after 2 min  - ekg here inpt shows T wave inversions v2-v5 and RBBB, which are old; but EKG earlier at Dr. Grajeda's office showed SVT to 125 bpm along with SBP in 80's; trop T elevated from 42 to 63 here; r/o NSTEMI  - pt is asymptomatic; care discussed with NP/Cardiologist Dr. Hennessy: recheck cardiac enzymes in am, recheck EKG in am, if enzymes cont to rise, will consider cath tomorrow, start lopressor 25 mg po bid, 1st dose tonight for SVT seen earlier today at cards office  - monitor on tele   - will check TFTs  - 2d echo

## 2019-10-03 NOTE — ED ADULT NURSE NOTE - NSIMPLEMENTINTERV_GEN_ALL_ED
Implemented All Fall Risk Interventions:  [place] to call system. Call bell, personal items and telephone within reach. Instruct patient to call for assistance. Room bathroom lighting operational. Non-slip footwear when patient is off stretcher. Physically safe environment: no spills, clutter or unnecessary equipment. Stretcher in lowest position, wheels locked, appropriate side rails in place. Provide visual cue, wrist band, yellow gown, etc. Monitor gait and stability. Monitor for mental status changes and reorient to person, place, and time. Review medications for side effects contributing to fall risk. Reinforce activity limits and safety measures with patient and family.

## 2019-10-03 NOTE — H&P ADULT - ASSESSMENT
83 y/o m w/ PMH of AS s/p TAVR, CAD s/p DEWAYNE and CABG, hypothyroidism, HTN, HLD p/w chest pain, found to have SVT on EKG at cardiologist office, here with rising trop

## 2019-10-04 ENCOUNTER — TRANSCRIPTION ENCOUNTER (OUTPATIENT)
Age: 84
End: 2019-10-04

## 2019-10-04 VITALS
HEART RATE: 81 BPM | SYSTOLIC BLOOD PRESSURE: 107 MMHG | RESPIRATION RATE: 18 BRPM | DIASTOLIC BLOOD PRESSURE: 64 MMHG | OXYGEN SATURATION: 97 % | TEMPERATURE: 99 F

## 2019-10-04 DIAGNOSIS — R79.89 OTHER SPECIFIED ABNORMAL FINDINGS OF BLOOD CHEMISTRY: ICD-10-CM

## 2019-10-04 DIAGNOSIS — I47.1 SUPRAVENTRICULAR TACHYCARDIA: ICD-10-CM

## 2019-10-04 LAB
ANION GAP SERPL CALC-SCNC: 12 MMOL/L — SIGNIFICANT CHANGE UP (ref 5–17)
BASOPHILS # BLD AUTO: 0.03 K/UL — SIGNIFICANT CHANGE UP (ref 0–0.2)
BASOPHILS NFR BLD AUTO: 0.3 % — SIGNIFICANT CHANGE UP (ref 0–2)
BUN SERPL-MCNC: 18 MG/DL — SIGNIFICANT CHANGE UP (ref 7–23)
CALCIUM SERPL-MCNC: 9.4 MG/DL — SIGNIFICANT CHANGE UP (ref 8.4–10.5)
CHLORIDE SERPL-SCNC: 102 MMOL/L — SIGNIFICANT CHANGE UP (ref 96–108)
CK MB BLD-MCNC: 2.6 % — SIGNIFICANT CHANGE UP (ref 0–3.5)
CK MB CFR SERPL CALC: 2.7 NG/ML — SIGNIFICANT CHANGE UP (ref 0–6.7)
CK SERPL-CCNC: 102 U/L — SIGNIFICANT CHANGE UP (ref 30–200)
CO2 SERPL-SCNC: 24 MMOL/L — SIGNIFICANT CHANGE UP (ref 22–31)
CREAT SERPL-MCNC: 1.08 MG/DL — SIGNIFICANT CHANGE UP (ref 0.5–1.3)
EOSINOPHIL # BLD AUTO: 0.46 K/UL — SIGNIFICANT CHANGE UP (ref 0–0.5)
EOSINOPHIL NFR BLD AUTO: 4.6 % — SIGNIFICANT CHANGE UP (ref 0–6)
GLUCOSE SERPL-MCNC: 97 MG/DL — SIGNIFICANT CHANGE UP (ref 70–99)
HCT VFR BLD CALC: 41.9 % — SIGNIFICANT CHANGE UP (ref 39–50)
HGB BLD-MCNC: 14.5 G/DL — SIGNIFICANT CHANGE UP (ref 13–17)
IMM GRANULOCYTES NFR BLD AUTO: 0.3 % — SIGNIFICANT CHANGE UP (ref 0–1.5)
LYMPHOCYTES # BLD AUTO: 1.95 K/UL — SIGNIFICANT CHANGE UP (ref 1–3.3)
LYMPHOCYTES # BLD AUTO: 19.7 % — SIGNIFICANT CHANGE UP (ref 13–44)
MAGNESIUM SERPL-MCNC: 2.1 MG/DL — SIGNIFICANT CHANGE UP (ref 1.6–2.6)
MCHC RBC-ENTMCNC: 30.3 PG — SIGNIFICANT CHANGE UP (ref 27–34)
MCHC RBC-ENTMCNC: 34.6 GM/DL — SIGNIFICANT CHANGE UP (ref 32–36)
MCV RBC AUTO: 87.5 FL — SIGNIFICANT CHANGE UP (ref 80–100)
MONOCYTES # BLD AUTO: 0.78 K/UL — SIGNIFICANT CHANGE UP (ref 0–0.9)
MONOCYTES NFR BLD AUTO: 7.9 % — SIGNIFICANT CHANGE UP (ref 2–14)
NEUTROPHILS # BLD AUTO: 6.66 K/UL — SIGNIFICANT CHANGE UP (ref 1.8–7.4)
NEUTROPHILS NFR BLD AUTO: 67.2 % — SIGNIFICANT CHANGE UP (ref 43–77)
NRBC # BLD: 0 /100 WBCS — SIGNIFICANT CHANGE UP (ref 0–0)
PHOSPHATE SERPL-MCNC: 3.2 MG/DL — SIGNIFICANT CHANGE UP (ref 2.5–4.5)
PLATELET # BLD AUTO: 237 K/UL — SIGNIFICANT CHANGE UP (ref 150–400)
POTASSIUM SERPL-MCNC: 4.1 MMOL/L — SIGNIFICANT CHANGE UP (ref 3.5–5.3)
POTASSIUM SERPL-SCNC: 4.1 MMOL/L — SIGNIFICANT CHANGE UP (ref 3.5–5.3)
RBC # BLD: 4.79 M/UL — SIGNIFICANT CHANGE UP (ref 4.2–5.8)
RBC # FLD: 14.2 % — SIGNIFICANT CHANGE UP (ref 10.3–14.5)
SODIUM SERPL-SCNC: 138 MMOL/L — SIGNIFICANT CHANGE UP (ref 135–145)
TROPONIN T, HIGH SENSITIVITY RESULT: 42 NG/L — SIGNIFICANT CHANGE UP (ref 0–51)
WBC # BLD: 9.91 K/UL — SIGNIFICANT CHANGE UP (ref 3.8–10.5)
WBC # FLD AUTO: 9.91 K/UL — SIGNIFICANT CHANGE UP (ref 3.8–10.5)

## 2019-10-04 PROCEDURE — 82550 ASSAY OF CK (CPK): CPT

## 2019-10-04 PROCEDURE — 99285 EMERGENCY DEPT VISIT HI MDM: CPT | Mod: 25

## 2019-10-04 PROCEDURE — 84100 ASSAY OF PHOSPHORUS: CPT

## 2019-10-04 PROCEDURE — 80053 COMPREHEN METABOLIC PANEL: CPT

## 2019-10-04 PROCEDURE — 84443 ASSAY THYROID STIM HORMONE: CPT

## 2019-10-04 PROCEDURE — 84439 ASSAY OF FREE THYROXINE: CPT

## 2019-10-04 PROCEDURE — 83735 ASSAY OF MAGNESIUM: CPT

## 2019-10-04 PROCEDURE — 83690 ASSAY OF LIPASE: CPT

## 2019-10-04 PROCEDURE — 82803 BLOOD GASES ANY COMBINATION: CPT

## 2019-10-04 PROCEDURE — 93355 ECHO TRANSESOPHAGEAL (TEE): CPT

## 2019-10-04 PROCEDURE — 85014 HEMATOCRIT: CPT

## 2019-10-04 PROCEDURE — 84481 FREE ASSAY (FT-3): CPT

## 2019-10-04 PROCEDURE — 85610 PROTHROMBIN TIME: CPT

## 2019-10-04 PROCEDURE — 71045 X-RAY EXAM CHEST 1 VIEW: CPT

## 2019-10-04 PROCEDURE — 80048 BASIC METABOLIC PNL TOTAL CA: CPT

## 2019-10-04 PROCEDURE — 83880 ASSAY OF NATRIURETIC PEPTIDE: CPT

## 2019-10-04 PROCEDURE — 93306 TTE W/DOPPLER COMPLETE: CPT | Mod: 26

## 2019-10-04 PROCEDURE — 82330 ASSAY OF CALCIUM: CPT

## 2019-10-04 PROCEDURE — 82553 CREATINE MB FRACTION: CPT

## 2019-10-04 PROCEDURE — 84295 ASSAY OF SERUM SODIUM: CPT

## 2019-10-04 PROCEDURE — 82947 ASSAY GLUCOSE BLOOD QUANT: CPT

## 2019-10-04 PROCEDURE — 82435 ASSAY OF BLOOD CHLORIDE: CPT

## 2019-10-04 PROCEDURE — 84132 ASSAY OF SERUM POTASSIUM: CPT

## 2019-10-04 PROCEDURE — 84484 ASSAY OF TROPONIN QUANT: CPT

## 2019-10-04 PROCEDURE — 93005 ELECTROCARDIOGRAM TRACING: CPT

## 2019-10-04 PROCEDURE — 83605 ASSAY OF LACTIC ACID: CPT

## 2019-10-04 PROCEDURE — 93306 TTE W/DOPPLER COMPLETE: CPT

## 2019-10-04 PROCEDURE — 85730 THROMBOPLASTIN TIME PARTIAL: CPT

## 2019-10-04 PROCEDURE — 85027 COMPLETE CBC AUTOMATED: CPT

## 2019-10-04 RX ORDER — METOPROLOL TARTRATE 50 MG
1 TABLET ORAL
Qty: 60 | Refills: 0
Start: 2019-10-04 | End: 2019-11-02

## 2019-10-04 RX ORDER — VALSARTAN 80 MG/1
1 TABLET ORAL
Qty: 0 | Refills: 0 | DISCHARGE

## 2019-10-04 RX ADMIN — Medication 81 MILLIGRAM(S): at 08:48

## 2019-10-04 RX ADMIN — Medication 50 MICROGRAM(S): at 05:57

## 2019-10-04 RX ADMIN — AMLODIPINE BESYLATE 2.5 MILLIGRAM(S): 2.5 TABLET ORAL at 05:57

## 2019-10-04 RX ADMIN — Medication 25 MILLIGRAM(S): at 08:50

## 2019-10-04 NOTE — DISCHARGE NOTE PROVIDER - NSDCCPCAREPLAN_GEN_ALL_CORE_FT
PRINCIPAL DISCHARGE DIAGNOSIS  Diagnosis: Chest pain  Assessment and Plan of Treatment:   HOME CARE INSTRUCTIONS  For the next few days, avoid physical activities that bring on chest pain. Continue physical activities as directed.  Do not smoke.  Avoid drinking alcohol.   Only take over-the-counter or prescription medicine for pain, discomfort, or fever as directed by your caregiver.  Follow your caregiver's suggestions for further testing if your chest pain does not go away.  Keep any follow-up appointments you made. If you do not go to an appointment, you could develop lasting (chronic) problems with pain. If there is any problem keeping an appointment, you must call to reschedule.   SEEK MEDICAL CARE IF:  You think you are having problems from the medicine you are taking. Read your medicine instructions carefully.  Your chest pain does not go away, even after treatment.  You develop a rash with blisters on your chest.  SEEK IMMEDIATE MEDICAL CARE IF:  You have increased chest pain or pain that spreads to your arm, neck, jaw, back, or abdomen.   You develop shortness of breath, an increasing cough, or you are coughing up blood.  You have severe back or abdominal pain, feel nauseous, or vomit.  You develop severe weakness, fainting, or chills.  You have a fever.  THIS IS AN EMERGENCY. Do not wait to see if the pain will go away. Get medical help at once. Call your local emergency services (_____________________). Do not drive yourself to the hospital.

## 2019-10-04 NOTE — CONSULT NOTE ADULT - SUBJECTIVE AND OBJECTIVE BOX
[facility] Cardiology Consult  _________________________    Patient is a 84y old  Male who presents with a chief complaint of chest pain (03 Oct 2019 19:30)      HPI:  83 y/o m w/ PMH of AS s/p TAVR, CAD s/p DEWAYNE and CABG, hypothyroidism, HTN, HLD p/w chest pain yesterday that lasted for 2 minutes, while at rest, substernal, pressure like tightening in quality, remitted on its own without any medication.  Pt had diaphoresis, and di not appear well according ot his wife.  Last time pt had these symptoms was before his TAVR in 2017.  Pt does not report any recent heartburn or changeS in food or po intake.  Pt went to his PMD, where he was found to have low SBP to 80's and SVT to 125, and was sent into [facility] for further evaluation. by the time he arrived. he felt fine. blood pressure and heart rate were normal. no complaints this morning.       PAST MEDICAL & SURGICAL HISTORY:  Aortic stenosis  [place] (hard of hearing): hearing aid bilateral  Low back pain  Stented coronary artery: 2004  CAD (coronary artery disease)  Atrial fibrillation: post-surgery, resolved  Carpal tunnel syndrome of right wrist  Inguinal hernia, bilateral  Hypothyroidism  Hyperlipidemia  BPH (benign prostatic hyperplasia)  HTN (hypertension)  History of carpal tunnel surgery: 2014  History of knee replacement, total, bilateral: right 2016  left 2011  S/P hernia repair: inquinal 2005  S/P tonsillectomy: at age 5 years old  S/P CABG (coronary artery bypass graft): 07/1996 CABG x3  S/P coronary angioplasty: 2004 drug eluting      MEDICATIONS  (STANDING):  amLODIPine   Tablet 2.5 milliGRAM(s) Oral daily  aspirin  chewable 81 milliGRAM(s) Oral daily  atorvastatin 40 milliGRAM(s) Oral at bedtime  influenza   Vaccine 0.5 milliLiter(s) IntraMuscular once  levothyroxine 50 MICROGram(s) Oral daily  metoprolol tartrate 25 milliGRAM(s) Oral two times a day    MEDICATIONS  (PRN):  acetaminophen   Tablet .. 650 milliGRAM(s) Oral every 6 hours PRN Temp greater or equal to 38C (100.4F), Mild Pain (1 - 3)      Allergies    No Known Allergies    Intolerances        Social Histroy: Tobacco- , ETOH-, Illicit Drugs-    T(C): 36.7 (10-04-19 @ 04:39), Max: 36.8 (10-03-19 @ 14:04)  HR: 71 (10-04-19 @ 08:50) (55 - 88)  BP: 131/71 (10-04-19 @ 08:50) (116/81 - 131/83)  RR: 18 (10-04-19 @ 04:39) (16 - 18)  SpO2: 96% (10-04-19 @ 04:39) (96% - 96%)  I&O's Summary      Review of Systems:  Constitutional: [ ] Fever [ ] Chills [ ] Fatigue [ ] Weight change   HEENT: [ ] Blurred vision [ ] Eye Pain [ ] Headache [ ] Runny nose [ ] Sore Throat   Respiratory: [ ] Cough [ ] Wheezing [ ] Shortness of breath  Cardiovascular: [x] Chest Pain [ ] Palpitations [ ] PATINO [ ] PND [ ] Orthopnea  Gastrointestinal: [ ] Abdominal Pain [ ] Diarrhea [ ] Constipation [ ] Hemorrhoids [ ] Nausea [ ] Vomiting  Genitourinary: [ ] Nocturia [ ] Dysuria [ ] Incontinence  Extremities: [ ] Swelling [ ] Joint Pain  Neurologic: [ ] Focal deficit [ ] Paresthesias [ ] Syncope  Lymphatic: [ ] Swelling [ ] Lymphadenopathy   Skin: [ ] Rash [ ] Ecchymoses [ ] Wounds [ ] Lesions  Psychiatry: [ ] Depression [ ] Suicidal/Homicidal Ideation [ ] Anxiety [ ] Sleep Disturbances  [x] 10 point review of systems is otherwise negative except as mentioned above            [ ]Unable to obtain    PHYSICAL EXAM:  GENERAL: Alert, NAD  NECK: Supple  CHEST/LUNG: Clear to auscultation bilaterally; No wheezes, rales, or rhonchi  HEART: S1 S2 normal, RRR,  No murmurs, rubs, or gallops  ABDOMEN: Soft, Nondistended  EXTREMITIES:  No LE edema.      LABS:                        14.5   9.91  )-----------( 237      ( 04 Oct 2019 07:23 )             41.9     10-04    138  |  102  |  18  ----------------------------<  97  4.1   |  24  |  1.08    Ca    9.4      04 Oct 2019 07:23  Phos  3.2     10-04  Mg     2.1     10-04    TPro  6.9  /  Alb  4.0  /  TBili  0.5  /  DBili  x   /  AST  14  /  ALT  16  /  AlkPhos  92  10-03    PT/INR - ( 03 Oct 2019 15:09 )   PT: 10.6 sec;   INR: 0.93 ratio         PTT - ( 03 Oct 2019 15:09 )  PTT:29.6 sec  CARDIAC MARKERS ( 04 Oct 2019 07:23 )  x     / x     / 102 U/L / x     / 2.7 ng/mL  CARDIAC MARKERS ( 03 Oct 2019 21:22 )  x     / x     / 102 U/L / x     / 3.1 ng/mL      Serum Pro-Brain Natriuretic Peptide: 197 pg/mL (10-03-19 @ 15:09)          MEDICATIONS  (STANDING):  amLODIPine   Tablet 2.5 milliGRAM(s) Oral daily  aspirin  chewable 81 milliGRAM(s) Oral daily  atorvastatin 40 milliGRAM(s) Oral at bedtime  influenza   Vaccine 0.5 milliLiter(s) IntraMuscular once  levothyroxine 50 MICROGram(s) Oral daily  metoprolol tartrate 25 milliGRAM(s) Oral two times a day    MEDICATIONS  (PRN):  acetaminophen   Tablet .. 650 milliGRAM(s) Oral every 6 hours PRN Temp greater or equal to 38C (100.4F), Mild Pain (1 - 3)      Troponin T, High Sensitivity Result: 42 ng/L (10-04-19 @ 07:23)  Troponin T, High Sensitivity Result: 63 ng/L (10-03-19 @ 18:12)  Troponin T, High Sensitivity Result: 42 ng/L (10-03-19 @ 15:09)      RADIOLOGY & ADDITIONAL TESTS:    Cardiology testing:  EKG:    Telemetry: sinus kristi 40-50s [facility] Cardiology Consult  _________________________    Patient is a 84y old  Male who presents with a chief complaint of chest pain (03 Oct 2019 19:30)      HPI:  83 y/o m w/ PMH of AS s/p TAVR, CAD s/p DEWAYNE and CABG, hypothyroidism, HTN, HLD p/w chest pain yesterday that lasted for 2 minutes, while at rest, substernal, pressure like tightening in quality, remitted on its own without any medication.  Pt had diaphoresis, and di not appear well according ot his wife.  Last time pt had these symptoms was before his TAVR in 2017.  Pt does not report any recent heartburn or changeS in food or po intake.  Pt went to his PMD, where he was found to have low SBP to 80's and SVT to 125, and was sent into [facility] for further evaluation. by the time he arrived. he felt fine. blood pressure and heart rate were normal. no complaints this morning.       PAST MEDICAL & SURGICAL HISTORY:  Aortic stenosis  [place] (hard of hearing): hearing aid bilateral  Low back pain  Stented coronary artery: 2004  CAD (coronary artery disease)  Atrial fibrillation: post-surgery, resolved  Carpal tunnel syndrome of right wrist  Inguinal hernia, bilateral  Hypothyroidism  Hyperlipidemia  BPH (benign prostatic hyperplasia)  HTN (hypertension)  History of carpal tunnel surgery: 2014  History of knee replacement, total, bilateral: right 2016  left 2011  S/P hernia repair: inquinal 2005  S/P tonsillectomy: at age 5 years old  S/P CABG (coronary artery bypass graft): 07/1996 CABG x3  S/P coronary angioplasty: 2004 drug eluting      MEDICATIONS  (STANDING):  amLODIPine   Tablet 2.5 milliGRAM(s) Oral daily  aspirin  chewable 81 milliGRAM(s) Oral daily  atorvastatin 40 milliGRAM(s) Oral at bedtime  influenza   Vaccine 0.5 milliLiter(s) IntraMuscular once  levothyroxine 50 MICROGram(s) Oral daily  metoprolol tartrate 25 milliGRAM(s) Oral two times a day    MEDICATIONS  (PRN):  acetaminophen   Tablet .. 650 milliGRAM(s) Oral every 6 hours PRN Temp greater or equal to 38C (100.4F), Mild Pain (1 - 3)      Allergies    No Known Allergies    Intolerances        Social Histroy: Tobacco- , ETOH-, Illicit Drugs-    T(C): 36.7 (10-04-19 @ 04:39), Max: 36.8 (10-03-19 @ 14:04)  HR: 71 (10-04-19 @ 08:50) (55 - 88)  BP: 131/71 (10-04-19 @ 08:50) (116/81 - 131/83)  RR: 18 (10-04-19 @ 04:39) (16 - 18)  SpO2: 96% (10-04-19 @ 04:39) (96% - 96%)  I&O's Summary      Review of Systems:  Constitutional: [ ] Fever [ ] Chills [ ] Fatigue [ ] Weight change   HEENT: [ ] Blurred vision [ ] Eye Pain [ ] Headache [ ] Runny nose [ ] Sore Throat   Respiratory: [ ] Cough [ ] Wheezing [ ] Shortness of breath  Cardiovascular: [x] Chest Pain [ ] Palpitations [ ] PATINO [ ] PND [ ] Orthopnea  Gastrointestinal: [ ] Abdominal Pain [ ] Diarrhea [ ] Constipation [ ] Hemorrhoids [ ] Nausea [ ] Vomiting  Genitourinary: [ ] Nocturia [ ] Dysuria [ ] Incontinence  Extremities: [ ] Swelling [ ] Joint Pain  Neurologic: [ ] Focal deficit [ ] Paresthesias [ ] Syncope  Lymphatic: [ ] Swelling [ ] Lymphadenopathy   Skin: [ ] Rash [ ] Ecchymoses [ ] Wounds [ ] Lesions  Psychiatry: [ ] Depression [ ] Suicidal/Homicidal Ideation [ ] Anxiety [ ] Sleep Disturbances  [x] 10 point review of systems is otherwise negative except as mentioned above            [ ]Unable to obtain    PHYSICAL EXAM:  GENERAL: Alert, NAD  NECK: Supple  CHEST/LUNG: Clear to auscultation bilaterally; No wheezes, rales, or rhonchi  HEART: S1 S2 normal, RRR,  No murmurs, rubs, or gallops  ABDOMEN: Soft, Nondistended  EXTREMITIES:  No LE edema.      LABS:                        14.5   9.91  )-----------( 237      ( 04 Oct 2019 07:23 )             41.9     10-04    138  |  102  |  18  ----------------------------<  97  4.1   |  24  |  1.08    Ca    9.4      04 Oct 2019 07:23  Phos  3.2     10-04  Mg     2.1     10-04    TPro  6.9  /  Alb  4.0  /  TBili  0.5  /  DBili  x   /  AST  14  /  ALT  16  /  AlkPhos  92  10-03    PT/INR - ( 03 Oct 2019 15:09 )   PT: 10.6 sec;   INR: 0.93 ratio         PTT - ( 03 Oct 2019 15:09 )  PTT:29.6 sec  CARDIAC MARKERS ( 04 Oct 2019 07:23 )  x     / x     / 102 U/L / x     / 2.7 ng/mL  CARDIAC MARKERS ( 03 Oct 2019 21:22 )  x     / x     / 102 U/L / x     / 3.1 ng/mL      Serum Pro-Brain Natriuretic Peptide: 197 pg/mL (10-03-19 @ 15:09)          MEDICATIONS  (STANDING):  amLODIPine   Tablet 2.5 milliGRAM(s) Oral daily  aspirin  chewable 81 milliGRAM(s) Oral daily  atorvastatin 40 milliGRAM(s) Oral at bedtime  influenza   Vaccine 0.5 milliLiter(s) IntraMuscular once  levothyroxine 50 MICROGram(s) Oral daily  metoprolol tartrate 25 milliGRAM(s) Oral two times a day    MEDICATIONS  (PRN):  acetaminophen   Tablet .. 650 milliGRAM(s) Oral every 6 hours PRN Temp greater or equal to 38C (100.4F), Mild Pain (1 - 3)      Troponin T, High Sensitivity Result: 42 ng/L (10-04-19 @ 07:23)  Troponin T, High Sensitivity Result: 63 ng/L (10-03-19 @ 18:12)  Troponin T, High Sensitivity Result: 42 ng/L (10-03-19 @ 15:09)      RADIOLOGY & ADDITIONAL TESTS:    Cardiology testing:  EKG: sinus rbbb- unchanged from baseline.     Telemetry: sinus kristi 40-50s

## 2019-10-04 NOTE — DISCHARGE NOTE PROVIDER - HOSPITAL COURSE
To be done. 85 y/o m w/ PMH of AS s/p TAVR, CAD s/p DEWAYNE and CABG, hypothyroidism, HTN, HLD p/w chest pain, found to have SVT on EKG at cardiologist office, here with rising trop.  Pt was admitted to Telemetry.  Trop T elevated from 42 to 63 down to 42 likely 2/2 SVT and not ACS.  Pt is asymptomatic. Echo revealed EF: 65% with TAVR intact.  R/O HTN (hypertension): holding home valsartan, cont norvasc for now.  cont lopressor 25mg po bid. Atrial fibrillation: pt no longer on xarelto.  Pt is hemodynamically stable for discharge to home.  Pt will follow up with his PMD in 1 week.

## 2019-10-04 NOTE — PROGRESS NOTE ADULT - SUBJECTIVE AND OBJECTIVE BOX
Patient is a 84y old  Male who presents with a chief complaint of chest pain (04 Oct 2019 09:48)      SUBJECTIVE / OVERNIGHT EVENTS:    Patient seen and examined. denies cp sob.       Vital Signs Last 24 Hrs  T(C): 37.1 (04 Oct 2019 11:49), Max: 37.1 (04 Oct 2019 11:49)  T(F): 98.8 (04 Oct 2019 11:49), Max: 98.8 (04 Oct 2019 11:49)  HR: 81 (04 Oct 2019 11:49) (55 - 88)  BP: 107/64 (04 Oct 2019 11:49) (107/64 - 131/83)  BP(mean): --  RR: 18 (04 Oct 2019 11:49) (16 - 18)  SpO2: 97% (04 Oct 2019 11:49) (96% - 97%)  I&O's Summary      PE:  GENERAL: NAD, AAOx3  HEAD:  Atraumatic, Normocephalic  NECK: Supple, No JVD  CHEST/LUNG: CTABL, No wheeze  HEART: Regular rate and rhythm; no murmur  ABDOMEN: Soft, Nontender, Nondistended; Bowel sounds present  EXTREMITIES:  2+ Peripheral Pulses, No clubbing, cyanosis, or edema  SKIN: No rashes or lesions  NEURO: No focal deficits    LABS:                        14.5   9.91  )-----------( 237      ( 04 Oct 2019 07:23 )             41.9     10-04    138  |  102  |  18  ----------------------------<  97  4.1   |  24  |  1.08    Ca    9.4      04 Oct 2019 07:23  Phos  3.2     10-04  Mg     2.1     10-04    TPro  6.9  /  Alb  4.0  /  TBili  0.5  /  DBili  x   /  AST  14  /  ALT  16  /  AlkPhos  92  10-03    PT/INR - ( 03 Oct 2019 15:09 )   PT: 10.6 sec;   INR: 0.93 ratio         PTT - ( 03 Oct 2019 15:09 )  PTT:29.6 sec  CAPILLARY BLOOD GLUCOSE        CARDIAC MARKERS ( 04 Oct 2019 07:23 )  x     / x     / 102 U/L / x     / 2.7 ng/mL  CARDIAC MARKERS ( 03 Oct 2019 21:22 )  x     / x     / 102 U/L / x     / 3.1 ng/mL          RADIOLOGY & ADDITIONAL TESTS:    Imaging Personally Reviewed:  [x] YES  [ ] NO    Consultant(s) Notes Reviewed:  [x] YES  [ ] NO    MEDICATIONS  (STANDING):  amLODIPine   Tablet 2.5 milliGRAM(s) Oral daily  aspirin  chewable 81 milliGRAM(s) Oral daily  atorvastatin 40 milliGRAM(s) Oral at bedtime  influenza   Vaccine 0.5 milliLiter(s) IntraMuscular once  levothyroxine 50 MICROGram(s) Oral daily  metoprolol tartrate 25 milliGRAM(s) Oral two times a day    MEDICATIONS  (PRN):  acetaminophen   Tablet .. 650 milliGRAM(s) Oral every 6 hours PRN Temp greater or equal to 38C (100.4F), Mild Pain (1 - 3)      Care Discussed with Consultants/Other Providers [x] YES  [ ] NO    HEALTH ISSUES - PROBLEM Dx:  Troponin level elevated: Troponin level elevated  SVT (supraventricular tachycardia): SVT (supraventricular tachycardia)  Need for prophylactic measure: Need for prophylactic measure  Atrial fibrillation: Atrial fibrillation  Hyperlipidemia: Hyperlipidemia  Hypothyroidism: Hypothyroidism  Aortic stenosis: Aortic stenosis  Chest pain: Chest pain

## 2019-10-04 NOTE — CONSULT NOTE ADULT - PROBLEM SELECTOR RECOMMENDATION 2
-  -secondary to svt  -troponin now downtrending.  -no further episodes or pain.  -check tte    -if tte is unremarkable. ok with discharge on lopressor and follow up with dr grajeda.     Patient of Dr. Wai Grajeda ([facility])    MARCO ANTONIO MarieO.  [phone]

## 2019-10-04 NOTE — CONSULT NOTE ADULT - ASSESSMENT
85 y/o m w/ PMH of AS s/p TAVR, CAD s/p DEWAYNE and CABG, hypothyroidism, HTN, HLD p/w chest pain and svt. 83 y/o m w/ PMH of AS s/p TAVR, CAD s/p DEWAYNE and CABG, hypothyroidism, HTN, HLD p/w chest pain and svt. now in sinus rhythm.

## 2019-10-04 NOTE — DISCHARGE NOTE NURSING/CASE MANAGEMENT/SOCIAL WORK - PATIENT PORTAL LINK FT
You can access the FollowMyHealth Patient Portal offered by [facility] by registering at the following website: [email or web address]. By joining [employer]’s FollowMyHealth portal, you will also be able to view your health information using other applications (apps) compatible with our system.

## 2019-10-04 NOTE — PROGRESS NOTE ADULT - PROBLEM SELECTOR PLAN 1
trop T elevated from 42 to 63 down to 42 likely 2/2 SVT and not ACS  pt is asymptomatic  monitor on tele   2d echo

## 2019-10-04 NOTE — DISCHARGE NOTE PROVIDER - CARE PROVIDER_API CALL
Wai rGajeda (MD)  Cardiovascular Disease; Internal Medicine  [address] [place] [address], [facility] [address]  [place] [address]  Phone: [phone]  Fax: [phone]  Follow Up Time: 1 week

## 2020-02-04 ENCOUNTER — EMERGENCY (EMERGENCY)
Facility: HOSPITAL | Age: 85
LOS: 1 days | Discharge: HOME | End: 2020-02-04
Attending: EMERGENCY MEDICINE
Payer: MEDICARE

## 2020-02-04 VITALS
OXYGEN SATURATION: 99 % | HEART RATE: 67 BPM | DIASTOLIC BLOOD PRESSURE: 81 MMHG | WEIGHT: 197.98 LBS | RESPIRATION RATE: 18 BRPM | TEMPERATURE: 98 F | HEIGHT: 69 IN | SYSTOLIC BLOOD PRESSURE: 178 MMHG

## 2020-02-04 DIAGNOSIS — Z95.1 PRESENCE OF AORTOCORONARY BYPASS GRAFT: Chronic | ICD-10-CM

## 2020-02-04 DIAGNOSIS — Z92.89 PERSONAL HISTORY OF OTHER MEDICAL TREATMENT: Chronic | ICD-10-CM

## 2020-02-04 DIAGNOSIS — Z96.653 PRESENCE OF ARTIFICIAL KNEE JOINT, BILATERAL: Chronic | ICD-10-CM

## 2020-02-04 LAB
ALBUMIN SERPL ELPH-MCNC: 4.4 G/DL — SIGNIFICANT CHANGE UP (ref 3.3–5)
ALP SERPL-CCNC: 99 U/L — SIGNIFICANT CHANGE UP (ref 40–120)
ALT FLD-CCNC: 26 U/L — SIGNIFICANT CHANGE UP (ref 10–45)
ANION GAP SERPL CALC-SCNC: 18 MMOL/L — HIGH (ref 5–17)
APPEARANCE UR: ABNORMAL
AST SERPL-CCNC: 15 U/L — SIGNIFICANT CHANGE UP (ref 10–40)
BILIRUB SERPL-MCNC: 0.4 MG/DL — SIGNIFICANT CHANGE UP (ref 0.2–1.2)
BILIRUB UR-MCNC: NEGATIVE — SIGNIFICANT CHANGE UP
BUN SERPL-MCNC: 26 MG/DL — HIGH (ref 7–23)
CALCIUM SERPL-MCNC: 9.9 MG/DL — SIGNIFICANT CHANGE UP (ref 8.4–10.5)
CHLORIDE SERPL-SCNC: 102 MMOL/L — SIGNIFICANT CHANGE UP (ref 96–108)
CO2 SERPL-SCNC: 21 MMOL/L — LOW (ref 22–31)
COLOR SPEC: SIGNIFICANT CHANGE UP
CREAT SERPL-MCNC: 1.08 MG/DL — SIGNIFICANT CHANGE UP (ref 0.5–1.3)
DIFF PNL FLD: ABNORMAL
GLUCOSE SERPL-MCNC: 122 MG/DL — HIGH (ref 70–99)
GLUCOSE UR QL: NEGATIVE — SIGNIFICANT CHANGE UP
HCT VFR BLD CALC: 45.2 % — SIGNIFICANT CHANGE UP (ref 39–50)
HGB BLD-MCNC: 15.2 G/DL — SIGNIFICANT CHANGE UP (ref 13–17)
KETONES UR-MCNC: NEGATIVE — SIGNIFICANT CHANGE UP
LEUKOCYTE ESTERASE UR-ACNC: ABNORMAL
MCHC RBC-ENTMCNC: 29.1 PG — SIGNIFICANT CHANGE UP (ref 27–34)
MCHC RBC-ENTMCNC: 33.6 GM/DL — SIGNIFICANT CHANGE UP (ref 32–36)
MCV RBC AUTO: 86.6 FL — SIGNIFICANT CHANGE UP (ref 80–100)
NITRITE UR-MCNC: NEGATIVE — SIGNIFICANT CHANGE UP
NRBC # BLD: 0 /100 WBCS — SIGNIFICANT CHANGE UP (ref 0–0)
PH UR: 6.5 — SIGNIFICANT CHANGE UP (ref 5–8)
PLATELET # BLD AUTO: 229 K/UL — SIGNIFICANT CHANGE UP (ref 150–400)
POTASSIUM SERPL-MCNC: 4.3 MMOL/L — SIGNIFICANT CHANGE UP (ref 3.5–5.3)
POTASSIUM SERPL-SCNC: 4.3 MMOL/L — SIGNIFICANT CHANGE UP (ref 3.5–5.3)
PROT SERPL-MCNC: 7.4 G/DL — SIGNIFICANT CHANGE UP (ref 6–8.3)
PROT UR-MCNC: ABNORMAL
RBC # BLD: 5.22 M/UL — SIGNIFICANT CHANGE UP (ref 4.2–5.8)
RBC # FLD: 16.6 % — HIGH (ref 10.3–14.5)
SODIUM SERPL-SCNC: 141 MMOL/L — SIGNIFICANT CHANGE UP (ref 135–145)
SP GR SPEC: 1.02 — SIGNIFICANT CHANGE UP (ref 1.01–1.02)
UROBILINOGEN FLD QL: NEGATIVE — SIGNIFICANT CHANGE UP
WBC # BLD: 13.48 K/UL — HIGH (ref 3.8–10.5)
WBC # FLD AUTO: 13.48 K/UL — HIGH (ref 3.8–10.5)

## 2020-02-04 PROCEDURE — 81001 URINALYSIS AUTO W/SCOPE: CPT

## 2020-02-04 PROCEDURE — 99284 EMERGENCY DEPT VISIT MOD MDM: CPT

## 2020-02-04 PROCEDURE — 80053 COMPREHEN METABOLIC PANEL: CPT

## 2020-02-04 PROCEDURE — 85027 COMPLETE CBC AUTOMATED: CPT

## 2020-02-04 PROCEDURE — 99283 EMERGENCY DEPT VISIT LOW MDM: CPT | Mod: 25

## 2020-02-04 PROCEDURE — 87086 URINE CULTURE/COLONY COUNT: CPT

## 2020-02-04 RX ORDER — CEPHALEXIN 500 MG
1 CAPSULE ORAL
Qty: 14 | Refills: 0
Start: 2020-02-04 | End: 2020-02-10

## 2020-02-04 RX ORDER — CEPHALEXIN 500 MG
500 CAPSULE ORAL EVERY 12 HOURS
Refills: 0 | Status: DISCONTINUED | OUTPATIENT
Start: 2020-02-04 | End: 2020-02-11

## 2020-02-04 RX ADMIN — Medication 500 MILLIGRAM(S): at 04:46

## 2020-02-04 NOTE — ED PROVIDER NOTE - CARE PROVIDER_API CALL
Norberto Marie)  Urology  [address] [facility], [address]  [place] [address]  Phone: [phone]  Fax: [phone]  Follow Up Time: 7-10 Days

## 2020-02-04 NOTE — ED PROVIDER NOTE - CLINICAL SUMMARY MEDICAL DECISION MAKING FREE TEXT BOX
Pt is an 85 y/o male with PMH AS s/p TAVR, CAD s/p DEWAYNE and CABG, hypothyroidism, HTN, HLD who p/w c/o urinary frequency and urgency x several days. States that 2-3 days ago he noted some hematuria, which self-resolved. Pt denies: chest pain, SOB, cough, fevers, chills, pleuritic chest pain, abdominal pain, n/v/d, back pain, neck pain, HA, neck stiffness, focal numbness or weakness, visual changes, dizziness, lightheadedness, leg pain/swelling, recent travel, recent trauma, recent immobilization, rash. Ddx includes, however, is not limited to: UTI, other  pathology, urinary retention, other. Bladder scan shows only 13 cc in urine. U/A suggestive of UTI, will treat with keflex. Recommend outpt Uro f/u and return precautions.

## 2020-02-04 NOTE — ED PROVIDER NOTE - NSFOLLOWUPINSTRUCTIONS_ED_ALL_ED_FT
We have prescribed antibiotics to your pharmacy - complete the full course.  Follow-up with your Urologist in 1-2 weeks.  Return to the ER if you develop: fevers, chills, abdominal pain, back pain, inability to empty your bladder or any worsening or concerning symptoms.    Urinary Tract Infection in Men    WHAT YOU NEED TO KNOW:    A urinary tract infection (UTI) is caused by bacteria that get inside your urinary tract. Most bacteria that enter your urinary tract come out when you urinate. If the bacteria stay in your urinary tract, you may get an infection. Your urinary tract includes your kidneys, ureters, bladder, and urethra. Urine is made in your kidneys, and it flows from the ureters to the bladder. Urine leaves the bladder through the urethra. A UTI is more common in your lower urinary tract, which includes your bladder and urethra.          DISCHARGE INSTRUCTIONS:    Return to the emergency department if:     You are urinating very little or not at all.      You have a high fever with shaking chills.       You have side or back pain that gets worse.    Contact your healthcare provider if:     You have a mild fever.      You do not feel better after 2 days of taking antibiotics.      You are vomiting.       You have new symptoms, such as blood or pus in your urine.       You have questions or concerns about your condition or care.    Medicines:     Antibiotics help fight a bacterial infection.       Medicines may be given to decrease pain and burning when you urinate. They will also help decrease the feeling that you need to urinate often. These medicines will make your urine orange or red.      Take your medicine as directed. Contact your healthcare provider if you think your medicine is not helping or if you have side effects. Tell him of her if you are allergic to any medicine. Keep a list of the medicines, vitamins, and herbs you take. Include the amounts, and when and why you take them. Bring the list or the pill bottles to follow-up visits. Carry your medicine list with you in case of an emergency.    Prevent another UTI:     Empty your bladder often. Urinate and empty your bladder as soon as you feel the need. Do not hold your urine for long periods of time.      Drink liquids as directed. Ask how much liquid to drink each day and which liquids are best for you. You may need to drink more liquids than usual to help flush out the bacteria. Do not drink alcohol, caffeine, or citrus juices. These can irritate your bladder and increase your symptoms. Your healthcare provider may recommend cranberry juice to help prevent a UTI.      Urinate after you have sex. This can help flush out bacteria passed during sex.      Do pelvic muscle exercises often. Pelvic muscle exercises may help you start and stop urinating. Strong pelvic muscles may help you empty your bladder easier. Squeeze these muscles tightly for 5 seconds like you are trying to hold back urine. Then relax for 5 seconds. Gradually work up to squeezing for 10 seconds. Do 3 sets of 15 repetitions a day, or as directed.    Follow up with your healthcare provider as directed: Write down your questions so you remember to ask them during your visits.

## 2020-02-04 NOTE — ED ADULT NURSE NOTE - PMH
Aortic stenosis    Atrial fibrillation  post-surgery, resolved  BPH (benign prostatic hyperplasia)    CAD (coronary artery disease)    Carpal tunnel syndrome of right wrist    [place] (hard of hearing)  hearing aid bilateral  HTN (hypertension)    Hyperlipidemia    Hypothyroidism    Inguinal hernia, bilateral    Low back pain    Stented coronary artery  2004

## 2020-02-04 NOTE — ED PROVIDER NOTE - OBJECTIVE STATEMENT
Pt is an 83 y/o male with PMH AS s/p TAVR, CAD s/p DEWAYNE and CABG, hypothyroidism, HTN, HLD who p/w c/o urinary frequency and urgency x several days. States that 2-3 days ago he noted some hematuria, which self-resolved. Pt denies: chest pain, SOB, cough, fevers, chills, pleuritic chest pain, abdominal pain, n/v/d, back pain, neck pain, HA, neck stiffness, focal numbness or weakness, visual changes, dizziness, lightheadedness, leg pain/swelling, recent travel, recent trauma, recent immobilization, rash.

## 2020-02-04 NOTE — ED PROVIDER NOTE - PATIENT PORTAL LINK FT
You can access the FollowMyHealth Patient Portal offered by [facility] by registering at the following website: [email or web address]. By joining [employer]’s FollowMyHealth portal, you will also be able to view your health information using other applications (apps) compatible with our system.

## 2020-02-04 NOTE — ED ADULT NURSE NOTE - CAS EDN DISCHARGE ASSESSMENT
LR being infused currently via pressure bag. Will continue to monitor. Safety and comfort provided. Family at bedside. Alert and oriented to person, place and time

## 2020-02-04 NOTE — ED ADULT NURSE REASSESSMENT NOTE - NS ED NURSE REASSESS COMMENT FT1
Pt received from BOOGIE Alvarado in [place], pt AOx3 breathing even unlabored spontaneously, NAD, VSS. Pt bladder scanned, 13 mL in bladder. MD Baker notified.

## 2020-02-04 NOTE — ED ADULT NURSE NOTE - NSIMPLEMENTINTERV_GEN_ALL_ED
Implemented All Universal Safety Interventions:  [place] to call system. Call bell, personal items and telephone within reach. Instruct patient to call for assistance. Room bathroom lighting operational. Non-slip footwear when patient is off stretcher. Physically safe environment: no spills, clutter or unnecessary equipment. Stretcher in lowest position, wheels locked, appropriate side rails in place.

## 2020-02-04 NOTE — ED PROVIDER NOTE - PMH
Aortic stenosis    Atrial fibrillation  post-surgery, resolved  BPH (benign prostatic hyperplasia)    CAD (coronary artery disease)    Carpal tunnel syndrome of right wrist    [place] (hard of hearing)  hearing aid bilateral  HTN (hypertension)    Hyperlipidemia    Hypothyroidism    Inguinal hernia, bilateral    Low back pain    Stented coronary artery  2004

## 2020-02-04 NOTE — ED ADULT NURSE NOTE - OBJECTIVE STATEMENT
85 y/o male PMH cardiac stents 20 years ago presents to ED c/o urinary retention x 1 day and hematuria x 2 weeks. States that he feels mild burning with urination. +urgency and dribbling today. Pt denies fever, chills, n/v/d, abdominal pain.

## 2020-02-05 LAB
CULTURE RESULTS: NO GROWTH — SIGNIFICANT CHANGE UP
SPECIMEN SOURCE: SIGNIFICANT CHANGE UP

## 2020-03-04 NOTE — ED ADULT NURSE NOTE - MODE OF DISCHARGE
Price (Do Not Change): 0.00 Instructions: This plan will send the code [id number] to the PM system.  DO NOT or CHANGE the price. Detail Level: Zone Ambulatory

## 2020-04-07 NOTE — ED ADULT NURSE NOTE - NS ED NOTE ABUSE SUSPICION NEGLECT YN
No Recommendation Preamble: Assessment: Assessment (Free Text): The patient verified their identity with their photo ID and consented to evaluation and management of their medical condition through telehealth. This visit was conducted in real-time with an audio and video platform, [email or web address] me during the [address] during a state of national emergency. This telehealth visit was medically necessary to prevent the community spread of COVID-19. \\n\\nThe patient is aware that we will bill their insurance for this telehealth visit following insurance guidelines. \\n\\nFace to face time with the patient was 15 minutes. \\n\\nConsent:\\nPatient consented to the following prior to the visit: \"I consent and understand that I am initiating a synchronous video health session with my healthcare provider and will be asked to confirm my identity with photo identification. I understand that there are potential risks to this technology, including interruptions, potential data breaches, and technical difficulties. Poor video quality may interfere with my healthcare providerâs ability to accurately diagnose my condition. I understand that my health care provider or I can discontinue the telemedicine consult/visit if it is felt that the videoconferencing connections are not adequate for the situation. I also understand that my insurance carrier will be billed for healthcare services rendered. \" Detail Level: Simple

## 2020-10-21 NOTE — H&P PST ADULT - GENITOURINARY
I spoke to the pt and her appt was rescheduled.  Date, time and [facility] location verified    Future Appointments   Date Time Provider Andrew Patel   11/25/2020  4:00 PM Alan [facility] negative

## 2021-09-07 NOTE — H&P ADULT - NSHPPOAPULMEMBOLUS_GEN_A_CORE
[facility]  Certificate of Child Health Examination  Student's Name:   Taty Agrawal Birth Date  2015  Sex  female  Race/Ethnicity   School/Grade Level/ID#     Address:   [facility] [place] [address]  Parent/Guardian             Telephone#                                            Home:                               Work:   IMMUNIZATIONS: To be completed by health care provider. The mo/da/yr for every dose administered is required. If a specific vaccine is medically contraindicated, a separate written statement must be attached by the health care responsible for completing the health examination explaining the medical reason for the contraindication.      Immunization History   Administered Date(s) Administered   • DTaP(INFANRIX) 11/17/2017   • DTaP/HIB/IPV 2016, 2016, 03/10/2017   • DTaP/IPV 10/09/2020   • HIB, Unspecified Formulation 11/17/2017   • Hep A, Pediatric, Unspecified Formulation 08/04/2017, 02/23/2018   • Hep B, adolescent or pediatric 2016, 2016, 03/10/2017   • Influenza, Unspecified Formulation 02/11/2017, 03/13/2017, 11/17/2017, 09/14/2018   • Influenza, injectable, quadrivalent, preservative-free 09/23/2019, 10/09/2020, 09/07/2021   • MMR 08/04/2017   • Measles Mumps Rubella Varicella 10/09/2020   • PPD 08/02/2019   • Pneumococcal Conjugate 13 valent 2016, 2016, 03/10/2017, 08/04/2017   • Rotavirus - monovalent 2016, 2016   • Varicella 08/04/2017      Immunizations given 09/07/2021      Health care provider (MD, DO, APN, PA, school health professional, health official) verifying above immunization history must sign below. If adding dates to the above immunization history section, put your initials by date(s) and sign here.)  Signature                                                                 Title                                                 Date  _____________________________________________________________________________________  Signature                                                                 Title                                                Date  _____________________________________________________________________________________   ALTERNATIVE PROOF OF IMMUNITY   1. Clinical diagnosis (measles, mumps, hepatitis B) is allowed when verified by physician and supported with lab confirmation.  Attach copy of lab result.    * MEASLES (Rubeola)  MO   DA  YR          **MUMPS  MO   DA  YR             HEPATITIS B  MO   DA   YR           VARICELLA  MO DA  YR      2. History of varicella (chickenpox) disease is acceptable if verified by health care provider, school health professional or health official.  Person signing below verifies that the parent/guardian’s description of varicella disease history is indicative of past infection and is accepting such history as documentation of disease.  Date of Disease                                  Signature                                                           Title   3. Laboratory Evidence of Immunity (check one)      __ Measles*         __ Mumps**        __ Rubella        __Varicella    (Attach copy of lab result)         *All measles cases diagnosed on or after July 1, 2002, must be confirmed by laboratory evidence.      **All mumps cases diagnosed on or after July 1, 2013, must be confirmed by laboratory evidence.     Completion of Alternative 1 or 3 MUST be accompanied by Labs & Physician Signature: ___________________________  Physician Statements of Immunity MUST be submitted to [facility] for review.     Certificates of [employer] Exemption to Immunizations or Physician Medical Statements of Medical Contraindication Are Reviewed   and Maintained by the School Authority     (11/2015)                                         (COMPLETE BOTH SIDES)                                  Approved [facility]  3/2016      Student's Name:  Taty Agrawal Birth Date 2015 Sex female School Grade Level/ID#     Page 2 of 2   HEALTH HISTORY      TO BE COMPLETED AND SIGNED BY PARENT/GUARDIAN AND VERIFIED BY HEALTH CARE PROVIDER  ALLERGIES: (Food, drug, insect, other) No has No Known Allergies.   MEDICATION: (List all prescribed or taken on a regular basis.)   No   Diagnosis of asthma?  Child wakes during night coughing? Yes    No  Yes    No  Loss of function of one of paired  organs?(eye/ear/kidney/testicle) Yes    No    Birth defects? Yes    No  Hospitalizations? Yes    No    Developmental delay? Yes    No   When? What for? Yes    No    Blood disorders? Hemophilia,  Sickle Cell, Other? Explain. Yes    No  Surgery? (List all.)  When? What for? Yes    No    Diabetes? Yes    No  Serious injury or illness? Yes    No    Head injury/Concussion/Passed out? Yes    No  TB skin test positive (past/present)? * Yes    No *If yes, refer to local [facility] dept   Seizures? What are they like?  Yes    No  TB disease (past or present)? * Yes    No *If yes, refer to local [facility] dept   Heart problem/Shortness of breath?  Yes    No  Tobacco use (type, frequency)?  Yes    No    Heart murmur/High blood pressure? Yes    No  Alcohol/Drug use?  Yes    No    Dizziness or chest pain with exercise? Yes    No  Family history of sudden death  before age 50? (Cause?) Yes    No    Eye/Vision problems? ______           __Glasses          __Contacts  Last exam by eye doctor ______  Other concerns? (crossed eye, drooping lids, squinting, difficulty reading) Dental?   __ Braces     __ Bridge     __ Plate   __Other   Ear/Hearing problems? Yes    No  Information may be shared with appropriate personnel for health and educational purposes.   Bone/Joint problem/injury/scoliosis? Yes    No  Parent/Guardian  Signature                                               Date   PHYSICAL EXAMINATION REQUIREMENTS   Entire section below to be completed by MD//APN/PA Head  Circumference if <2-3 years old - BP 94/60 (BP Location: LUE - Left upper extremity, Patient Position: Sitting, Cuff Size: Regular)   Pulse 100   Temp 99 °F (37.2 °C) (Temporal)   Resp 22   Ht 3' 5.26\" (1.048 m)   Wt (!) 13.2 kg (29 lb 2 oz)   BMI 12.03 kg/m²   BSA 0.63 m²    <1 %ile (Z= -4.09) based on CDC (Girls, 2-20 Years) BMI-for-age based on BMI available as of 9/7/2021.   DIABETES SCREENING (NOT REQUIRED FOR ) BMI>85% age/sex No     And any two of the following: Family History: No  Ethnic Minority: No    Signs of Insulin Resistance (hypertension, dyslipidemia, polycystic ovarian syndrome, acanthosis nigricans): No  At Risk: No   LEAD RISK QUESTIONNAIRE Required for children age 6 months through 6 years enrolled in licensed or public school operated day care, , nursery school and/or . (Blood test required if resides in [place] or high risk zip code.)  Questionnaire Administered? Yes  Blood Test Indicated? No   Blood Test Date _____   Blood Test Result ______________   TB SKIN OR BLOOD TEST Recommended only for children in high-risk groups including children immunosuppressed due to HIV infection or other conditions, frequent travel to or born in high prevalence countries or those exposed to adults in high-risk categories. See CDC guidelines. http://www.cdc.gov/tb/publications/factsheets/testing/TB_testing.htm.  Test Needed: No     Test performed: No                          Skin Test:    Date Read              /      /             Result:   Positive__      Negative__        mm________                          Blood Test: Date Reported       /      /               Result:  Positive__      Negative__      Value________  LAB TESTS (recommended) Date/Result  Date/Results   Hemoglobin or Hematocrit NA Sickle Cell (when indicated) NA   Urinalysis NA Developmental Screening Tool NA        SYSTEM REVIEW Normal  Comments/Follow-up/Needs  Normal Comments/Follow-up/Needs   Skin  Yes   Endocrine Yes    Ears Yes Screening Result: B-ears pass Gastrointestinal Yes    Eyes Yes Screening Result: N/A Genito-Urinary Yes                                        Nose Yes  Neurologic Yes    Throat Yes  Musculoskeletal Yes    Mouth/Dental Yes  Spinal Exam Yes    Cardiovascular/HTN Yes  Nutritional status Yes    Respiratory Yes Diagnosis of Asthma: No   Mental Health Yes    Currently Prescribed Asthma Medication:        No  Quick-relief medication (e.g. Short Acting Beta Antagonist)        No   Controller medication (e.g. inhaled corticosteroid) Other     NEEDS/MODIFICATIONS required in the school setting: No restrictions DIETARY Needs/Restrictions: No restrictions   SPECIAL INSTRUCTIONS/DEVICES e.g. safety glasses, glass eye, chest protector for arrhythmia, pacemaker, prosthetic device, dental bridge, false teeth, athletic support/cup: No restrictions   MENTAL HEALTH/OTHER Is there anything else the school should know about this student?  If you would like to discuss this student’s health with school or school health personnel:  Not needed   EMERGENCY ACTION needed while at school due to child’s health condition (e.g. ,seizures, asthma, insect sting, food, peanut allergy, bleeding problem, diabetes, heart problem)?   No   On the basis of the examination on this day, I approve this child’s participation in                                (If No or Modified please attach explanation.)  PHYSICAL EDUCATION:  Yes                               INTER[employer]OL[employer]IC SPORTS   Yes   Print Name  Vivienne Jack MD         Signature                                                                       Date: 09/07/2021   Address:  [facility] [address]  [facility] [address]  [facility] [address]   [address]  [facility] [address]  [phone]         (Complete Both Sides)     Approved [facility] 3/2016   no

## 2021-09-22 NOTE — PRE-OP CHECKLIST - PATIENT SENT TO
"Requested Prescriptions   Pending Prescriptions Disp Refills     FLUoxetine (PROZAC) 10 MG tablet [Pharmacy Med Name: FLUOXETINE HCL 10 MG TABLET] 30 tablet 0     Sig: TAKE 1 TABLET BY MOUTH EVERY DAY       SSRIs Protocol Passed - 9/22/2021 12:29 AM        Passed - Recent (12 mo) or future (30 days) visit within the authorizing provider's specialty     Patient has had an office visit with the authorizing provider or a provider within the authorizing providers department within the previous 12 mos or has a future within next 30 days. See \"Patient Info\" tab in inbasket, or \"Choose Columns\" in Meds & Orders section of the refill encounter.              Passed - Medication is active on med list        Passed - Patient is age 18 or older     Last Written Prescription Date:  8/31/21  Last Fill Quantity: 30,   # refills: 0  Last Office Visit: 7/28/21 [place]  Future Office visit: none      Routing refill request to provider for review/approval because:  Unable to fill prescription refills per RN Medical Refill Policy.  Brenda J. Goodell, RN on 9/22/2021 at 9:33 AM      "
Called and spoke to Patient after verifying last name and date of birth. Pt reminded of Yuli Celi's response and was transferred to scheduling line, to set up appointment. Heide Roman RN .............. 9/22/2021  2:07 PM    
One week ziggy fill given. Needs to be seen for follow up.   Yuli Alatorre PA-C on 9/22/2021 at 9:38 AM    
operating room

## 2022-01-11 NOTE — DISCHARGE NOTE ADULT - NSFTFCONTACT3DT_GEN_ALL_CORE
07-Apr-2017 Erythromycin Counseling:  I discussed with the patient the risks of erythromycin including but not limited to GI upset, allergic reaction, drug rash, diarrhea, increase in liver enzymes, and yeast infections.

## 2022-02-21 NOTE — PRE-OP CHECKLIST - WEIGHT IN LBS
HSG and semen analysis. Then we can proceed with ovulation induction medication to assist with ovulation. We have to make sure there is no blockage or issues with sperm prior.     Galilea Long MD, FACOG  OB/GYN      
206.7

## 2023-01-13 ENCOUNTER — OFFICE (OUTPATIENT)
Dept: URBAN - METROPOLITAN AREA CLINIC 90 | Facility: CLINIC | Age: 88
Setting detail: OPHTHALMOLOGY
End: 2023-01-13
Payer: MEDICARE

## 2023-01-13 DIAGNOSIS — H01.004: ICD-10-CM

## 2023-01-13 DIAGNOSIS — H02.831: ICD-10-CM

## 2023-01-13 DIAGNOSIS — H01.001: ICD-10-CM

## 2023-01-13 DIAGNOSIS — D31.31: ICD-10-CM

## 2023-01-13 DIAGNOSIS — H16.223: ICD-10-CM

## 2023-01-13 DIAGNOSIS — H25.13: ICD-10-CM

## 2023-01-13 DIAGNOSIS — H40.013: ICD-10-CM

## 2023-01-13 DIAGNOSIS — H18.523: ICD-10-CM

## 2023-01-13 DIAGNOSIS — H53.2: ICD-10-CM

## 2023-01-13 DIAGNOSIS — H02.834: ICD-10-CM

## 2023-01-13 DIAGNOSIS — H02.403: ICD-10-CM

## 2023-01-13 PROBLEM — H11.32 SUBCONJUNCTIVAL HEMORRHAGE; LEFT EYE: Status: ACTIVE | Noted: 2023-01-13

## 2023-01-13 PROCEDURE — 92014 COMPRE OPH EXAM EST PT 1/>: CPT | Performed by: OPHTHALMOLOGY

## 2023-01-13 ASSESSMENT — REFRACTION_CURRENTRX
OS_CYLINDER: SPH
OD_SPHERE: 5.62
OS_VPRISM_DIRECTION: PROGS
OS_ADD: +2.25
OD_ADD: +3.62
OS_CYLINDER: SPH
OD_VPRISM_DIRECTION: SV
OD_ADD: +3.50
OD_SPHERE: +3.00
OS_SPHERE: 5.62
OS_OVR_VA: 20/
OD_OVR_VA: 20/
OD_SPHERE: +5.75
OS_ADD: +3.50
OD_CYLINDER: -SPH
OS_CYLINDER: SPH
OD_SPHERE: +2.75
OD_CYLINDER: SPH
OD_CYLINDER: -0.75
OS_SPHERE: +2.75
OS_CYLINDER: SPH
OS_SPHERE: +2.75
OD_SPHERE: +2.75
OD_OVR_VA: 20/
OD_CYLINDER: SPH
OS_SPHERE: +2.50
OS_ADD: +3.50
OD_CYLINDER: SPH
OS_SPHERE: +2.75
OS_CYLINDER: -0.25
OS_ADD: +3.50
OD_AXIS: 048
OD_CYLINDER: -0.12
OS_VPRISM_DIRECTION: SV
OS_OVR_VA: 20/
OD_OVR_VA: 20/
OS_VPRISM_DIRECTION: PROGS
OS_VPRISM_DIRECTION: PROGS
OS_OVR_VA: 20/
OD_VPRISM_DIRECTION: PROGS
OD_ADD: +2.25
OD_ADD: +3.50
OS_VPRISM_DIRECTION: SV
OD_SPHERE: +2.75
OD_VPRISM_DIRECTION: SV
OS_VPRISM_DIRECTION: SV
OS_CYLINDER: SPH
OS_SPHERE: +5.50
OS_AXIS: 073
OD_VPRISM_DIRECTION: SV
OD_VPRISM_DIRECTION: PROGS
OD_AXIS: 117
OD_VPRISM_DIRECTION: PROGS

## 2023-01-13 ASSESSMENT — REFRACTION_MANIFEST
OS_SPHERE: +2.25
OS_SPHERE: +2.75
OS_ADD: +2.25
OD_SPHERE: +2.75
OD_CYLINDER: -0.75
OS_VA1: 20/40
OD_VA1: 20/20
OS_SPHERE: +2.25
OD_CYLINDER: -0.75
OD_SPHERE: +3.00
OD_ADD: +2.25
OS_CYLINDER: SPH
OS_VA1: 20/25-2
OD_AXIS: 125
OD_AXIS: 125
OS_ADD: +2.50
OD_CYLINDER: -0.75
OS_CYLINDER: SPH
OD_VA1: 20/30-2
OU_VA: 20/25-1
OD_SPHERE: +2.75
OS_VA2: 20/30(J2)
OD_AXIS: 125
OD_VA2: 20/30(J2)
OD_VA1: 20/30-2
OS_SPHERE: +2.50
OS_CYLINDER: -0.75
OD_CYLINDER: -0.25
OS_VA1: 20/30-2
OD_SPHERE: +3.00
OS_AXIS: 030
OD_ADD: +2.50
OD_AXIS: 125

## 2023-01-13 ASSESSMENT — SPHEQUIV_DERIVED
OD_SPHEQUIV: 2.375
OD_SPHEQUIV: 2.5
OD_SPHEQUIV: 2.625
OD_SPHEQUIV: 2.625
OS_SPHEQUIV: 3
OS_SPHEQUIV: 2.375
OD_SPHEQUIV: 2.625

## 2023-01-13 ASSESSMENT — LID EXAM ASSESSMENTS
OD_BLEPHARITIS: RUL 2+
OS_COMMENTS: &QUOT
OS_BLEPHARITIS: LUL 2+
OS_COMMENTS: SOUPY&QUOT
OD_COMMENTS: &QUOT
OD_COMMENTS: SOUPY&QUOT

## 2023-01-13 ASSESSMENT — AXIALLENGTH_DERIVED
OD_AL: 22.3614
OS_AL: 23.0468
OS_AL: 22.8167
OD_AL: 22.3614
OD_AL: 22.4053
OD_AL: 22.3614
OD_AL: 22.4493

## 2023-01-13 ASSESSMENT — KERATOMETRY
OD_K2POWER_DIOPTERS: 45.25
OD_AXISANGLE_DEGREES: 068
OD_K1POWER_DIOPTERS: 43.25
OS_K1POWER_DIOPTERS: 42.50
OS_AXISANGLE_DEGREES: 090
OS_K2POWER_DIOPTERS: 42.50

## 2023-01-13 ASSESSMENT — SUPERFICIAL PUNCTATE KERATITIS (SPK)
OS_SPK: 2+ 3+
OD_SPK: 2+ 3+

## 2023-01-13 ASSESSMENT — REFRACTION_AUTOREFRACTION
OD_AXIS: 143
OS_SPHERE: +3.25
OD_CYLINDER: -1.00
OD_SPHERE: +3.00
OS_AXIS: 050
OS_CYLINDER: -0.50

## 2023-01-13 ASSESSMENT — VISUAL ACUITY
OS_BCVA: 20/30-2
OD_BCVA: 20/40-

## 2023-01-13 ASSESSMENT — LID POSITION - COMMENTS
OS_COMMENTS: FLOPPY
OD_COMMENTS: FLOPPY

## 2023-01-13 ASSESSMENT — LID POSITION - DERMATOCHALASIS
OD_DERMATOCHALASIS: RUL
OS_DERMATOCHALASIS: LUL

## 2023-01-13 ASSESSMENT — LID POSITION - PTOSIS
OS_PTOSIS: LUL T
OD_PTOSIS: RUL T

## 2023-01-13 ASSESSMENT — TONOMETRY
OS_IOP_MMHG: 14
OD_IOP_MMHG: 15

## 2023-01-13 ASSESSMENT — CONFRONTATIONAL VISUAL FIELD TEST (CVF)
OD_FINDINGS: FULL
OS_FINDINGS: FULL

## 2023-07-14 ENCOUNTER — OFFICE (OUTPATIENT)
Dept: URBAN - METROPOLITAN AREA CLINIC 90 | Facility: CLINIC | Age: 88
Setting detail: OPHTHALMOLOGY
End: 2023-07-14
Payer: MEDICARE

## 2023-07-14 DIAGNOSIS — H16.223: ICD-10-CM

## 2023-07-14 DIAGNOSIS — H40.013: ICD-10-CM

## 2023-07-14 DIAGNOSIS — H53.2: ICD-10-CM

## 2023-07-14 DIAGNOSIS — H02.831: ICD-10-CM

## 2023-07-14 DIAGNOSIS — H02.834: ICD-10-CM

## 2023-07-14 DIAGNOSIS — H18.523: ICD-10-CM

## 2023-07-14 DIAGNOSIS — H02.403: ICD-10-CM

## 2023-07-14 DIAGNOSIS — H25.13: ICD-10-CM

## 2023-07-14 PROCEDURE — 92012 INTRM OPH EXAM EST PATIENT: CPT | Performed by: OPHTHALMOLOGY

## 2023-07-14 ASSESSMENT — REFRACTION_CURRENTRX
OS_CYLINDER: 0.00
OD_VPRISM_DIRECTION: PROGS
OD_SPHERE: +2.75
OS_CYLINDER: SPH
OD_ADD: +3.50
OD_AXIS: 048
OS_SPHERE: +2.75
OD_ADD: +3.62
OD_AXIS: 122
OD_CYLINDER: -0.25
OD_CYLINDER: -0.12
OS_VPRISM_DIRECTION: PROGS
OS_CYLINDER: SPH
OS_ADD: +3.50
OS_ADD: +2.25
OD_VPRISM_DIRECTION: PROGS
OS_VPRISM_DIRECTION: PROGS
OD_SPHERE: +3.00
OS_VPRISM_DIRECTION: SV
OD_SPHERE: +2.75
OD_ADD: +2.25
OD_OVR_VA: 20/
OD_ADD: +3.50
OD_SPHERE: 5.62
OD_CYLINDER: SPH
OS_OVR_VA: 20/
OS_CYLINDER: -0.25
OS_CYLINDER: SPH
OS_CYLINDER: SPH
OS_ADD: +3.50
OD_VPRISM_DIRECTION: SV
OS_VPRISM_DIRECTION: SV
OD_CYLINDER: SPH
OD_OVR_VA: 20/
OD_OVR_VA: 20/
OD_CYLINDER: SPH
OS_ADD: +3.50
OD_CYLINDER: -0.75
OS_VPRISM_DIRECTION: SV
OD_SPHERE: +2.75
OS_SPHERE: +5.50
OD_VPRISM_DIRECTION: PROGS
OD_VPRISM_DIRECTION: SV
OS_OVR_VA: 20/
OS_OVR_VA: 20/
OS_SPHERE: +2.75
OS_SPHERE: +2.75
OS_SPHERE: +2.50
OD_AXIS: 117
OS_AXIS: 073
OS_VPRISM_DIRECTION: PROGS
OS_AXIS: 180
OS_SPHERE: 5.62
OD_SPHERE: +5.75
OD_VPRISM_DIRECTION: SV

## 2023-07-14 ASSESSMENT — AXIALLENGTH_DERIVED
OD_AL: 22.4493
OS_AL: 22.6591
OD_AL: 22.3614
OS_AL: 22.3927
OD_AL: 22.4935
OD_AL: 22.3614
OD_AL: 22.3614

## 2023-07-14 ASSESSMENT — LID POSITION - COMMENTS
OD_COMMENTS: FLOPPY
OS_COMMENTS: FLOPPY

## 2023-07-14 ASSESSMENT — REFRACTION_AUTOREFRACTION
OD_AXIS: 138
OD_CYLINDER: -1.00
OS_AXIS: 062
OS_CYLINDER: -0.75
OS_SPHERE: +3.50
OD_SPHERE: +2.75

## 2023-07-14 ASSESSMENT — TONOMETRY
OS_IOP_MMHG: 12
OD_IOP_MMHG: 13

## 2023-07-14 ASSESSMENT — REFRACTION_MANIFEST
OD_AXIS: 125
OD_AXIS: 125
OS_SPHERE: +2.25
OU_VA: 20/25-1
OD_SPHERE: +3.00
OD_CYLINDER: -0.75
OS_VA1: 20/25-2
OD_VA1: 20/30-2
OD_SPHERE: +3.00
OS_AXIS: 030
OD_CYLINDER: -0.75
OD_ADD: +2.50
OS_VA2: 20/30(J2)
OS_ADD: +2.50
OS_SPHERE: +2.75
OD_CYLINDER: -0.75
OD_ADD: +2.25
OD_SPHERE: +2.75
OS_CYLINDER: SPH
OS_ADD: +2.25
OD_CYLINDER: -0.25
OD_VA2: 20/30(J2)
OD_AXIS: 125
OD_SPHERE: +2.75
OD_AXIS: 125
OS_VA1: 20/30-2
OS_CYLINDER: -0.75
OS_VA1: 20/40
OS_CYLINDER: SPH
OS_SPHERE: +2.50
OS_SPHERE: +2.25
OD_VA1: 20/30-2
OD_VA1: 20/20

## 2023-07-14 ASSESSMENT — VISUAL ACUITY
OD_BCVA: 20/40-2
OS_BCVA: 20/30-2

## 2023-07-14 ASSESSMENT — SUPERFICIAL PUNCTATE KERATITIS (SPK)
OD_SPK: 1+
OS_SPK: 1+

## 2023-07-14 ASSESSMENT — KERATOMETRY
OS_AXISANGLE_DEGREES: 101
OD_AXISANGLE_DEGREES: 068
OS_K2POWER_DIOPTERS: 44.25
OD_K1POWER_DIOPTERS: 43.25
OD_K2POWER_DIOPTERS: 45.25
OS_K1POWER_DIOPTERS: 43.00

## 2023-07-14 ASSESSMENT — SPHEQUIV_DERIVED
OD_SPHEQUIV: 2.625
OD_SPHEQUIV: 2.625
OS_SPHEQUIV: 2.375
OS_SPHEQUIV: 3.125
OD_SPHEQUIV: 2.625
OD_SPHEQUIV: 2.25
OD_SPHEQUIV: 2.375

## 2023-07-14 ASSESSMENT — TEAR BREAK UP TIME (TBUT)
OS_TBUT: 1+
OD_TBUT: 1+

## 2023-07-14 ASSESSMENT — CONFRONTATIONAL VISUAL FIELD TEST (CVF)
OS_FINDINGS: FULL
OD_FINDINGS: FULL

## 2023-07-14 ASSESSMENT — LID POSITION - DERMATOCHALASIS
OS_DERMATOCHALASIS: LUL
OD_DERMATOCHALASIS: RUL

## 2023-07-14 ASSESSMENT — LID POSITION - PTOSIS
OS_PTOSIS: LUL T
OD_PTOSIS: RUL T

## 2023-10-12 NOTE — PATIENT PROFILE ADULT. - PRO SERVICES AT DISCH
Received: Today  Keya Enrique RN Thibodeaux, Charles P., LPN; Emerson Zamora MA  Cc: Ivelisse Mcdonald, BLANKA  Caller: Unspecified (Today, 10:23 AM)  I spoke with patient this morning, she states that she is a newly diagnosed diabetic and she feels that she is unable to tolerate fasting.  She spoke to her doctor and she is willing to do the cologuard test. She has what she needs to do so.  Please take her off the schedule.      unsure

## 2023-10-27 NOTE — H&P PST ADULT - DOCUMENT STATUS

## 2024-01-12 ENCOUNTER — OFFICE (OUTPATIENT)
Dept: URBAN - METROPOLITAN AREA CLINIC 90 | Facility: CLINIC | Age: 89
Setting detail: OPHTHALMOLOGY
End: 2024-01-12
Payer: MEDICARE

## 2024-01-12 DIAGNOSIS — H53.2: ICD-10-CM

## 2024-01-12 DIAGNOSIS — H02.834: ICD-10-CM

## 2024-01-12 DIAGNOSIS — H02.831: ICD-10-CM

## 2024-01-12 DIAGNOSIS — D31.31: ICD-10-CM

## 2024-01-12 DIAGNOSIS — H02.403: ICD-10-CM

## 2024-01-12 DIAGNOSIS — H43.812: ICD-10-CM

## 2024-01-12 DIAGNOSIS — H25.13: ICD-10-CM

## 2024-01-12 DIAGNOSIS — H18.523: ICD-10-CM

## 2024-01-12 DIAGNOSIS — H16.223: ICD-10-CM

## 2024-01-12 PROCEDURE — 92014 COMPRE OPH EXAM EST PT 1/>: CPT | Performed by: OPHTHALMOLOGY

## 2024-01-12 ASSESSMENT — REFRACTION_MANIFEST
OS_CYLINDER: SPH
OS_VA1: 20/30-2
OS_CYLINDER: SPH
OD_VA2: 20/30(J2)
OD_AXIS: 125
OS_VA1: 20/40
OD_VA1: 20/30-2
OD_SPHERE: +3.00
OS_ADD: +2.50
OD_AXIS: 125
OD_VA1: 20/30-2
OD_CYLINDER: -0.75
OD_SPHERE: +3.00
OD_CYLINDER: -0.25
OD_ADD: +2.50
OS_ADD: +2.25
OD_VA1: 20/20
OD_AXIS: 125
OD_AXIS: 125
OD_SPHERE: +2.75
OD_CYLINDER: -0.75
OU_VA: 20/25-1
OD_CYLINDER: -0.75
OS_AXIS: 030
OS_CYLINDER: -0.75
OS_VA1: 20/25-2
OS_SPHERE: +2.75
OS_SPHERE: +2.50
OD_ADD: +2.25
OD_SPHERE: +2.75
OS_VA2: 20/30(J2)
OS_SPHERE: +2.25
OS_SPHERE: +2.25

## 2024-01-12 ASSESSMENT — REFRACTION_CURRENTRX
OD_VPRISM_DIRECTION: PROGS
OD_SPHERE: +5.75
OS_SPHERE: +2.75
OD_AXIS: 117
OS_CYLINDER: -0.25
OS_SPHERE: +2.50
OS_SPHERE: +2.75
OD_SPHERE: +2.75
OD_ADD: +3.62
OD_CYLINDER: -0.12
OS_CYLINDER: SPH
OD_SPHERE: +3.00
OS_AXIS: 073
OS_VPRISM_DIRECTION: SV
OS_CYLINDER: SPH
OD_ADD: +3.50
OS_SPHERE: 5.62
OD_OVR_VA: 20/
OD_OVR_VA: 20/
OD_VPRISM_DIRECTION: PROGS
OS_ADD: +3.50
OS_ADD: +3.50
OD_SPHERE: +2.75
OS_CYLINDER: SPH
OD_CYLINDER: -0.75
OD_VPRISM_DIRECTION: SV
OS_ADD: +3.50
OD_SPHERE: +2.75
OS_VPRISM_DIRECTION: PROGS
OS_AXIS: 180
OS_ADD: +2.25
OD_VPRISM_DIRECTION: PROGS
OD_OVR_VA: 20/
OS_OVR_VA: 20/
OD_ADD: +3.50
OD_CYLINDER: SPH
OD_ADD: +2.25
OD_VPRISM_DIRECTION: SV
OD_CYLINDER: -0.25
OS_VPRISM_DIRECTION: PROGS
OS_VPRISM_DIRECTION: SV
OS_SPHERE: +5.50
OD_CYLINDER: SPH
OS_SPHERE: +2.75
OS_OVR_VA: 20/
OD_AXIS: 122
OD_AXIS: 048
OS_VPRISM_DIRECTION: PROGS
OS_OVR_VA: 20/
OS_CYLINDER: SPH
OD_CYLINDER: SPH
OD_SPHERE: 5.62
OS_VPRISM_DIRECTION: SV
OS_CYLINDER: 0.00
OD_VPRISM_DIRECTION: SV

## 2024-01-12 ASSESSMENT — SPHEQUIV_DERIVED
OS_SPHEQUIV: 2.375
OD_SPHEQUIV: 2.625
OD_SPHEQUIV: 2.625
OD_SPHEQUIV: 2.375
OD_SPHEQUIV: 2.625
OS_SPHEQUIV: 3
OD_SPHEQUIV: 2.5

## 2024-01-12 ASSESSMENT — TEAR BREAK UP TIME (TBUT)
OD_TBUT: 1+
OS_TBUT: 1+

## 2024-01-12 ASSESSMENT — SUPERFICIAL PUNCTATE KERATITIS (SPK)
OS_SPK: 1+
OD_SPK: 1+

## 2024-01-12 ASSESSMENT — LID POSITION - COMMENTS
OS_COMMENTS: FLOPPY
OD_COMMENTS: FLOPPY

## 2024-01-12 ASSESSMENT — REFRACTION_AUTOREFRACTION
OD_SPHERE: +3.00
OS_AXIS: 060
OD_CYLINDER: -1.00
OD_AXIS: 134
OS_CYLINDER: -0.50
OS_SPHERE: +3.25

## 2024-01-12 ASSESSMENT — LID POSITION - DERMATOCHALASIS
OS_DERMATOCHALASIS: LUL
OD_DERMATOCHALASIS: RUL

## 2024-01-12 ASSESSMENT — LID POSITION - PTOSIS
OS_PTOSIS: LUL T
OD_PTOSIS: RUL T

## 2024-01-12 ASSESSMENT — CONFRONTATIONAL VISUAL FIELD TEST (CVF)
OS_FINDINGS: FULL
OD_FINDINGS: FULL

## 2024-02-03 ENCOUNTER — EMERGENCY (EMERGENCY)
Facility: HOSPITAL | Age: 89
LOS: 1 days | Discharge: HOME | End: 2024-02-03
Attending: EMERGENCY MEDICINE
Payer: MEDICARE

## 2024-02-03 VITALS
OXYGEN SATURATION: 97 % | RESPIRATION RATE: 16 BRPM | DIASTOLIC BLOOD PRESSURE: 76 MMHG | HEART RATE: 80 BPM | SYSTOLIC BLOOD PRESSURE: 120 MMHG | TEMPERATURE: 99 F

## 2024-02-03 VITALS
OXYGEN SATURATION: 96 % | SYSTOLIC BLOOD PRESSURE: 141 MMHG | WEIGHT: 184.97 LBS | HEART RATE: 79 BPM | TEMPERATURE: 99 F | DIASTOLIC BLOOD PRESSURE: 90 MMHG | HEIGHT: 70 IN | RESPIRATION RATE: 18 BRPM

## 2024-02-03 DIAGNOSIS — Z92.89 PERSONAL HISTORY OF OTHER MEDICAL TREATMENT: Chronic | ICD-10-CM

## 2024-02-03 DIAGNOSIS — Z96.653 PRESENCE OF ARTIFICIAL KNEE JOINT, BILATERAL: Chronic | ICD-10-CM

## 2024-02-03 DIAGNOSIS — Z95.1 PRESENCE OF AORTOCORONARY BYPASS GRAFT: Chronic | ICD-10-CM

## 2024-02-03 LAB
ALBUMIN SERPL ELPH-MCNC: 4 G/DL — SIGNIFICANT CHANGE UP (ref 3.3–5)
ALP SERPL-CCNC: 67 U/L — SIGNIFICANT CHANGE UP (ref 40–120)
ALT FLD-CCNC: 28 U/L — SIGNIFICANT CHANGE UP (ref 10–45)
ANION GAP SERPL CALC-SCNC: 10 MMOL/L — SIGNIFICANT CHANGE UP (ref 5–17)
APPEARANCE UR: CLEAR — SIGNIFICANT CHANGE UP
AST SERPL-CCNC: 26 U/L — SIGNIFICANT CHANGE UP (ref 10–40)
BACTERIA # UR AUTO: NEGATIVE /HPF — SIGNIFICANT CHANGE UP
BASOPHILS # BLD AUTO: 0.03 K/UL — SIGNIFICANT CHANGE UP (ref 0–0.2)
BASOPHILS NFR BLD AUTO: 0.3 % — SIGNIFICANT CHANGE UP (ref 0–2)
BILIRUB SERPL-MCNC: 0.6 MG/DL — SIGNIFICANT CHANGE UP (ref 0.2–1.2)
BILIRUB UR-MCNC: NEGATIVE — SIGNIFICANT CHANGE UP
BUN SERPL-MCNC: 19 MG/DL — SIGNIFICANT CHANGE UP (ref 7–23)
CALCIUM SERPL-MCNC: 9.2 MG/DL — SIGNIFICANT CHANGE UP (ref 8.4–10.5)
CAST: 0 /LPF — SIGNIFICANT CHANGE UP (ref 0–4)
CHLORIDE SERPL-SCNC: 105 MMOL/L — SIGNIFICANT CHANGE UP (ref 96–108)
CO2 SERPL-SCNC: 26 MMOL/L — SIGNIFICANT CHANGE UP (ref 22–31)
COLOR SPEC: YELLOW — SIGNIFICANT CHANGE UP
CREAT SERPL-MCNC: 1.22 MG/DL — SIGNIFICANT CHANGE UP (ref 0.5–1.3)
DIFF PNL FLD: NEGATIVE — SIGNIFICANT CHANGE UP
EGFR: 57 ML/MIN/1.73M2 — LOW
EOSINOPHIL # BLD AUTO: 0.23 K/UL — SIGNIFICANT CHANGE UP (ref 0–0.5)
EOSINOPHIL NFR BLD AUTO: 2.2 % — SIGNIFICANT CHANGE UP (ref 0–6)
GLUCOSE SERPL-MCNC: 109 MG/DL — HIGH (ref 70–99)
GLUCOSE UR QL: NEGATIVE MG/DL — SIGNIFICANT CHANGE UP
HCT VFR BLD CALC: 44.9 % — SIGNIFICANT CHANGE UP (ref 39–50)
HGB BLD-MCNC: 15.6 G/DL — SIGNIFICANT CHANGE UP (ref 13–17)
IMM GRANULOCYTES NFR BLD AUTO: 0.3 % — SIGNIFICANT CHANGE UP (ref 0–0.9)
KETONES UR-MCNC: NEGATIVE MG/DL — SIGNIFICANT CHANGE UP
LEUKOCYTE ESTERASE UR-ACNC: NEGATIVE — SIGNIFICANT CHANGE UP
LYMPHOCYTES # BLD AUTO: 0.54 K/UL — LOW (ref 1–3.3)
LYMPHOCYTES # BLD AUTO: 5.2 % — LOW (ref 13–44)
MCHC RBC-ENTMCNC: 30.9 PG — SIGNIFICANT CHANGE UP (ref 27–34)
MCHC RBC-ENTMCNC: 34.7 GM/DL — SIGNIFICANT CHANGE UP (ref 32–36)
MCV RBC AUTO: 88.9 FL — SIGNIFICANT CHANGE UP (ref 80–100)
MONOCYTES # BLD AUTO: 0.97 K/UL — HIGH (ref 0–0.9)
MONOCYTES NFR BLD AUTO: 9.4 % — SIGNIFICANT CHANGE UP (ref 2–14)
NEUTROPHILS # BLD AUTO: 8.53 K/UL — HIGH (ref 1.8–7.4)
NEUTROPHILS NFR BLD AUTO: 82.6 % — HIGH (ref 43–77)
NITRITE UR-MCNC: NEGATIVE — SIGNIFICANT CHANGE UP
NRBC # BLD: 0 /100 WBCS — SIGNIFICANT CHANGE UP (ref 0–0)
PH UR: 6.5 — SIGNIFICANT CHANGE UP (ref 5–8)
PLATELET # BLD AUTO: 201 K/UL — SIGNIFICANT CHANGE UP (ref 150–400)
POTASSIUM SERPL-MCNC: 4.4 MMOL/L — SIGNIFICANT CHANGE UP (ref 3.5–5.3)
POTASSIUM SERPL-SCNC: 4.4 MMOL/L — SIGNIFICANT CHANGE UP (ref 3.5–5.3)
PROT SERPL-MCNC: 6.7 G/DL — SIGNIFICANT CHANGE UP (ref 6–8.3)
PROT UR-MCNC: 30 MG/DL
RBC # BLD: 5.05 M/UL — SIGNIFICANT CHANGE UP (ref 4.2–5.8)
RBC # FLD: 14.6 % — HIGH (ref 10.3–14.5)
RBC CASTS # UR COMP ASSIST: 1 /HPF — SIGNIFICANT CHANGE UP (ref 0–4)
SODIUM SERPL-SCNC: 141 MMOL/L — SIGNIFICANT CHANGE UP (ref 135–145)
SP GR SPEC: 1.02 — SIGNIFICANT CHANGE UP (ref 1–1.03)
SQUAMOUS # UR AUTO: 0 /HPF — SIGNIFICANT CHANGE UP (ref 0–5)
UROBILINOGEN FLD QL: 0.2 MG/DL — SIGNIFICANT CHANGE UP (ref 0.2–1)
WBC # BLD: 10.33 K/UL — SIGNIFICANT CHANGE UP (ref 3.8–10.5)
WBC # FLD AUTO: 10.33 K/UL — SIGNIFICANT CHANGE UP (ref 3.8–10.5)
WBC UR QL: 0 /HPF — SIGNIFICANT CHANGE UP (ref 0–5)

## 2024-02-03 PROCEDURE — 71045 X-RAY EXAM CHEST 1 VIEW: CPT | Mod: 26

## 2024-02-03 PROCEDURE — 99284 EMERGENCY DEPT VISIT MOD MDM: CPT

## 2024-02-03 PROCEDURE — 72170 X-RAY EXAM OF PELVIS: CPT | Mod: 26

## 2024-02-03 RX ORDER — SODIUM CHLORIDE 9 MG/ML
500 INJECTION INTRAMUSCULAR; INTRAVENOUS; SUBCUTANEOUS ONCE
Refills: 0 | Status: COMPLETED | OUTPATIENT
Start: 2024-02-03 | End: 2024-02-03

## 2024-02-03 RX ADMIN — SODIUM CHLORIDE 1000 MILLILITER(S): 9 INJECTION INTRAMUSCULAR; INTRAVENOUS; SUBCUTANEOUS at 19:26

## 2024-02-03 NOTE — ED PROVIDER NOTE - NSFOLLOWUPCLINICS_GEN_ALL_ED_FT
[facility]  Urology  [address] [place] [address]  Phone: [phone]  Fax:

## 2024-02-03 NOTE — ED PROVIDER NOTE - CHIEF COMPLAINT
The patient is a 88y Male complaining of  The patient is a 88y Male complaining of b/l leg weakness.

## 2024-02-03 NOTE — ED PROVIDER NOTE - PROGRESS NOTE DETAILS
Megan Matteo DO (PGY3): X-ray negative for acute pathology. Labs without emergent findings.  Patient found to be retaining urine, Mckee catheter placed.  Patient states he has not had this issue in the past.  Repeat neuro exam intact. No saddle anesthesia. Patient ambulating with walker at baseline in the ED.  spoke with family, offered admission for PT evitaal, family states they would like to be discharged and will follow-up outpatient.  Will provide urology follow-up for urinary retention.  Will have patient follow-up with PCP regarding physical therapy.  Given strict return precautions and advised that they can return at any time if they change their mind. Pt made aware of lab and imaging results, including incidental findings. Questions regarding their symptoms were addressed. Advised to follow up with pcp. Given strict return precautions. Pt verbalized understanding.

## 2024-02-03 NOTE — ED PROVIDER NOTE - PATIENT PORTAL LINK FT
You can access the FollowMyHealth Patient Portal offered by [facility] by registering at the following website: [email or web address]. By joining [employer]’s FollowMyHealth portal, you will also be able to view your health information using other applications (apps) compatible with our system.

## 2024-02-03 NOTE — ED ADULT NURSE NOTE - OBJECTIVE STATEMENT
88YOM brought to ED with wife and son c/o bilateral lower extremity weakness for past few weeks, pt endorses being unable to pick himself up from sitting position with associated dyspnea on exertion when previously he was ambulating with cane and walker. Significant hx HTN/HLD/CAD w/ previous CABG & stents. Pt is alert x3, breathing spontaneously unlabored, peripheral pulses present x4, skin intact, pt says he feels the urge to pee but was unable to urinate. Pt denies pain/SOB/fever/chills/nausea/vomiting. IV placed, labs drawn, bed in lowest position.

## 2024-02-03 NOTE — ED PROVIDER NOTE - NSICDXPASTMEDICALHX_GEN_ALL_CORE_FT
PAST MEDICAL HISTORY:  Aortic stenosis     Atrial fibrillation post-surgery, resolved    BPH (benign prostatic hyperplasia)     CAD (coronary artery disease)     Carpal tunnel syndrome of right wrist     [place] (hard of hearing) hearing aid bilateral    HTN (hypertension)     Hyperlipidemia     Hypothyroidism     Inguinal hernia, bilateral     Low back pain     Stented coronary artery 2004

## 2024-02-03 NOTE — ED ADULT NURSE NOTE - NSFALLHARMRISKINTERV_ED_ALL_ED

## 2024-02-03 NOTE — ED ADULT NURSE REASSESSMENT NOTE - NS ED NURSE REASSESS COMMENT FT1
pt states that he cannot urinate, pt bladder scanned 1055 mL found on bladder scan, reed catheter completed, 2 RNs present, sterile technique utilized, aseptic equipment, tolerated well, 900 mL drained immediately upon insertion, urine appeared clear/yellow

## 2024-02-03 NOTE — ED PROVIDER NOTE - PHYSICAL EXAMINATION
GENERAL: Awake. Alert. NAD. Well nourished.  HEENT: NC/AT, Conjunctiva pink, no scleral icterus. Airway patent.   LUNGS: CTAB. No wheezes or rales noted.  CARDIAC: RRR.  ABDOMEN: No masses noted. Soft, NT, ND, no rebound, no guarding.  BACK: No midline spinal tenderness  EXT: No edema, no calf tenderness, distal pulses 2+ bilaterally,  NEURO: A&Ox3. Moving all extremities. Sensation and strength intact throughout. No focal deficits.   SKIN: Warm and dry.   PSYCH: Normal affect.

## 2024-02-03 NOTE — ED ADULT NURSE NOTE - NSICDXPASTMEDICALHX_GEN_ALL_CORE_FT
PAST MEDICAL HISTORY:  Aortic stenosis     Atrial fibrillation post-surgery, resolved    BPH (benign prostatic hyperplasia)     CAD (coronary artery disease)     Carpal tunnel syndrome of right wrist     [place] (hard of hearing) hearing aid bilateral    HTN (hypertension)     Hyperlipidemia     Hypothyroidism     Inguinal hernia, bilateral     Low back pain     Stented coronary artery 2004

## 2024-02-03 NOTE — ED PROVIDER NOTE - ATTENDING CONTRIBUTION TO CARE
89 y/o m w/ PMH of AS s/p TAVR, CAD s/p DEWAYNE and CABG, hypothyroidism, HTN, HLD p/w Inability to stand today while he was on the sofa his son tried to help him get up which she has been doing in the last several weeks and help him go up and down the stairs but usually once he stands and is able to walk with his cane today was unable to do that he was concerned he was going to fall so he is on heparin back down.  Denies any falls or trauma or head strikes.  Patient is a nonfocal neuroexam ranging all extremities actively and passively no signs of trauma ANO x 3 does not appear weak generalized to check basic labs likely admission socially for ambulatory dysfunction likely deterioration due to age

## 2024-02-03 NOTE — ED PROVIDER NOTE - OBJECTIVE STATEMENT
88-year-old male  past medical history aortic stenosis status post TAVR, CAD status post stents and CABG, hypothyroid, hypertension, hyperlipidemia presenting to the emergency department with difficulty standing today described as bilateral lower extremity weakness, patient states this been happening upon standing for the past several weeks but once he is able to stand with help he can ambulate with a cane and a walker.  today patient states when he stood up and his legs felt weak and felt like he was going to fall so he presented the hospital.  Denies lower back pain or leg pain.  No falls, no head trauma, no LOC.  Denies chest pain, SOB, recent illness, fever, chills, urinary symptoms.  No confusion.  Patient at mental status baseline.

## 2024-02-04 ENCOUNTER — INPATIENT (INPATIENT)
Facility: HOSPITAL | Age: 89
LOS: 2 days | Discharge: HOME | DRG: 194 | End: 2024-02-07
Attending: INTERNAL MEDICINE | Admitting: STUDENT IN AN ORGANIZED HEALTH CARE EDUCATION/TRAINING PROGRAM
Payer: MEDICARE

## 2024-02-04 VITALS
SYSTOLIC BLOOD PRESSURE: 124 MMHG | HEIGHT: 70 IN | OXYGEN SATURATION: 97 % | HEART RATE: 59 BPM | RESPIRATION RATE: 17 BRPM | DIASTOLIC BLOOD PRESSURE: 70 MMHG | TEMPERATURE: 99 F

## 2024-02-04 DIAGNOSIS — E78.5 HYPERLIPIDEMIA, UNSPECIFIED: ICD-10-CM

## 2024-02-04 DIAGNOSIS — J10.1 INFLUENZA DUE TO OTHER IDENTIFIED INFLUENZA VIRUS WITH OTHER RESPIRATORY MANIFESTATIONS: ICD-10-CM
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
## 2024-02-04 DIAGNOSIS — Z29.9 ENCOUNTER FOR PROPHYLACTIC MEASURES, UNSPECIFIED: ICD-10-CM

## 2024-02-04 DIAGNOSIS — I10 ESSENTIAL (PRIMARY) HYPERTENSION: ICD-10-CM

## 2024-02-04 DIAGNOSIS — I25.10 ATHEROSCLEROTIC HEART DISEASE OF NATIVE CORONARY ARTERY WITHOUT ANGINA PECTORIS: ICD-10-CM

## 2024-02-04 DIAGNOSIS — R33.9 RETENTION OF URINE, UNSPECIFIED: ICD-10-CM

## 2024-02-04 DIAGNOSIS — Z95.1 PRESENCE OF AORTOCORONARY BYPASS GRAFT: Chronic | ICD-10-CM

## 2024-02-04 DIAGNOSIS — E03.9 HYPOTHYROIDISM, UNSPECIFIED: ICD-10-CM

## 2024-02-04 DIAGNOSIS — Z96.653 PRESENCE OF ARTIFICIAL KNEE JOINT, BILATERAL: Chronic | ICD-10-CM

## 2024-02-04 DIAGNOSIS — Z92.89 PERSONAL HISTORY OF OTHER MEDICAL TREATMENT: Chronic | ICD-10-CM

## 2024-02-04 DIAGNOSIS — J11.1 INFLUENZA DUE TO UNIDENTIFIED INFLUENZA VIRUS WITH OTHER RESPIRATORY MANIFESTATIONS: ICD-10-CM

## 2024-02-04 LAB
ALBUMIN SERPL ELPH-MCNC: 3.9 G/DL — SIGNIFICANT CHANGE UP (ref 3.3–5)
ALP SERPL-CCNC: 57 U/L — SIGNIFICANT CHANGE UP (ref 40–120)
ALT FLD-CCNC: 32 U/L — SIGNIFICANT CHANGE UP (ref 10–45)
ANION GAP SERPL CALC-SCNC: 12 MMOL/L — SIGNIFICANT CHANGE UP (ref 5–17)
AST SERPL-CCNC: 36 U/L — SIGNIFICANT CHANGE UP (ref 10–40)
BASE EXCESS BLDV CALC-SCNC: 4.6 MMOL/L — HIGH (ref -2–3)
BASOPHILS # BLD AUTO: 0.04 K/UL — SIGNIFICANT CHANGE UP (ref 0–0.2)
BASOPHILS NFR BLD AUTO: 0.5 % — SIGNIFICANT CHANGE UP (ref 0–2)
BILIRUB SERPL-MCNC: 0.8 MG/DL — SIGNIFICANT CHANGE UP (ref 0.2–1.2)
BUN SERPL-MCNC: 19 MG/DL — SIGNIFICANT CHANGE UP (ref 7–23)
CA-I SERPL-SCNC: 1.24 MMOL/L — SIGNIFICANT CHANGE UP (ref 1.15–1.33)
CALCIUM SERPL-MCNC: 8.7 MG/DL — SIGNIFICANT CHANGE UP (ref 8.4–10.5)
CHLORIDE BLDV-SCNC: 101 MMOL/L — SIGNIFICANT CHANGE UP (ref 96–108)
CHLORIDE SERPL-SCNC: 104 MMOL/L — SIGNIFICANT CHANGE UP (ref 96–108)
CO2 BLDV-SCNC: 32 MMOL/L — HIGH (ref 22–26)
CO2 SERPL-SCNC: 25 MMOL/L — SIGNIFICANT CHANGE UP (ref 22–31)
CREAT SERPL-MCNC: 1.18 MG/DL — SIGNIFICANT CHANGE UP (ref 0.5–1.3)
EGFR: 60 ML/MIN/1.73M2 — SIGNIFICANT CHANGE UP
EOSINOPHIL # BLD AUTO: 0.06 K/UL — SIGNIFICANT CHANGE UP (ref 0–0.5)
EOSINOPHIL NFR BLD AUTO: 0.8 % — SIGNIFICANT CHANGE UP (ref 0–6)
FLUAV AG NPH QL: DETECTED
FLUBV AG NPH QL: SIGNIFICANT CHANGE UP
GAS PNL BLDV: 134 MMOL/L — LOW (ref 136–145)
GAS PNL BLDV: SIGNIFICANT CHANGE UP
GLUCOSE BLDV-MCNC: 96 MG/DL — SIGNIFICANT CHANGE UP (ref 70–99)
GLUCOSE SERPL-MCNC: 99 MG/DL — SIGNIFICANT CHANGE UP (ref 70–99)
HCO3 BLDV-SCNC: 30 MMOL/L — HIGH (ref 22–29)
HCT VFR BLD CALC: 44.5 % — SIGNIFICANT CHANGE UP (ref 39–50)
HCT VFR BLDA CALC: 48 % — SIGNIFICANT CHANGE UP (ref 39–51)
HGB BLD CALC-MCNC: 15.9 G/DL — SIGNIFICANT CHANGE UP (ref 12.6–17.4)
HGB BLD-MCNC: 15.6 G/DL — SIGNIFICANT CHANGE UP (ref 13–17)
IMM GRANULOCYTES NFR BLD AUTO: 0.4 % — SIGNIFICANT CHANGE UP (ref 0–0.9)
LACTATE BLDV-MCNC: 2.5 MMOL/L — HIGH (ref 0.5–2)
LYMPHOCYTES # BLD AUTO: 0.82 K/UL — LOW (ref 1–3.3)
LYMPHOCYTES # BLD AUTO: 10.4 % — LOW (ref 13–44)
MCHC RBC-ENTMCNC: 30.8 PG — SIGNIFICANT CHANGE UP (ref 27–34)
MCHC RBC-ENTMCNC: 35.1 GM/DL — SIGNIFICANT CHANGE UP (ref 32–36)
MCV RBC AUTO: 87.8 FL — SIGNIFICANT CHANGE UP (ref 80–100)
MONOCYTES # BLD AUTO: 1.19 K/UL — HIGH (ref 0–0.9)
MONOCYTES NFR BLD AUTO: 15.2 % — HIGH (ref 2–14)
NEUTROPHILS # BLD AUTO: 5.71 K/UL — SIGNIFICANT CHANGE UP (ref 1.8–7.4)
NEUTROPHILS NFR BLD AUTO: 72.7 % — SIGNIFICANT CHANGE UP (ref 43–77)
NRBC # BLD: 0 /100 WBCS — SIGNIFICANT CHANGE UP (ref 0–0)
PCO2 BLDV: 48 MMHG — SIGNIFICANT CHANGE UP (ref 42–55)
PH BLDV: 7.41 — SIGNIFICANT CHANGE UP (ref 7.32–7.43)
PLATELET # BLD AUTO: 173 K/UL — SIGNIFICANT CHANGE UP (ref 150–400)
PO2 BLDV: 32 MMHG — SIGNIFICANT CHANGE UP (ref 25–45)
POTASSIUM BLDV-SCNC: 4.5 MMOL/L — SIGNIFICANT CHANGE UP (ref 3.5–5.1)
POTASSIUM SERPL-MCNC: 3.9 MMOL/L — SIGNIFICANT CHANGE UP (ref 3.5–5.3)
POTASSIUM SERPL-SCNC: 3.9 MMOL/L — SIGNIFICANT CHANGE UP (ref 3.5–5.3)
PROT SERPL-MCNC: 6.6 G/DL — SIGNIFICANT CHANGE UP (ref 6–8.3)
RBC # BLD: 5.07 M/UL — SIGNIFICANT CHANGE UP (ref 4.2–5.8)
RBC # FLD: 14.4 % — SIGNIFICANT CHANGE UP (ref 10.3–14.5)
RSV RNA NPH QL NAA+NON-PROBE: SIGNIFICANT CHANGE UP
SAO2 % BLDV: 49.1 % — LOW (ref 67–88)
SARS-COV-2 RNA SPEC QL NAA+PROBE: SIGNIFICANT CHANGE UP
SODIUM SERPL-SCNC: 141 MMOL/L — SIGNIFICANT CHANGE UP (ref 135–145)
WBC # BLD: 7.85 K/UL — SIGNIFICANT CHANGE UP (ref 3.8–10.5)
WBC # FLD AUTO: 7.85 K/UL — SIGNIFICANT CHANGE UP (ref 3.8–10.5)

## 2024-02-04 PROCEDURE — 99285 EMERGENCY DEPT VISIT HI MDM: CPT

## 2024-02-04 PROCEDURE — 99223 1ST HOSP IP/OBS HIGH 75: CPT

## 2024-02-04 PROCEDURE — 72170 X-RAY EXAM OF PELVIS: CPT | Mod: 26

## 2024-02-04 RX ORDER — TAMSULOSIN HYDROCHLORIDE 0.4 MG/1
0.4 CAPSULE ORAL AT BEDTIME
Refills: 0 | Status: DISCONTINUED | OUTPATIENT
Start: 2024-02-04 | End: 2024-02-07

## 2024-02-04 RX ORDER — AMLODIPINE BESYLATE 2.5 MG/1
1 TABLET ORAL
Qty: 0 | Refills: 0 | DISCHARGE

## 2024-02-04 RX ORDER — ASPIRIN/CALCIUM CARB/MAGNESIUM 324 MG
81 TABLET ORAL DAILY
Refills: 0 | Status: DISCONTINUED | OUTPATIENT
Start: 2024-02-04 | End: 2024-02-07

## 2024-02-04 RX ORDER — ACETAMINOPHEN 500 MG
650 TABLET ORAL EVERY 6 HOURS
Refills: 0 | Status: DISCONTINUED | OUTPATIENT
Start: 2024-02-04 | End: 2024-02-07

## 2024-02-04 RX ORDER — ASPIRIN/CALCIUM CARB/MAGNESIUM 324 MG
1 TABLET ORAL
Qty: 30 | Refills: 0

## 2024-02-04 RX ORDER — ALBUTEROL 90 UG/1
2 AEROSOL, METERED ORAL EVERY 6 HOURS
Refills: 0 | Status: DISCONTINUED | OUTPATIENT
Start: 2024-02-04 | End: 2024-02-07

## 2024-02-04 RX ORDER — UBIDECARENONE 100 MG
1 CAPSULE ORAL
Qty: 0 | Refills: 0 | DISCHARGE

## 2024-02-04 RX ORDER — METOPROLOL TARTRATE 50 MG
25 TABLET ORAL
Refills: 0 | Status: DISCONTINUED | OUTPATIENT
Start: 2024-02-04 | End: 2024-02-07

## 2024-02-04 RX ORDER — ATORVASTATIN CALCIUM 80 MG/1
40 TABLET, FILM COATED ORAL AT BEDTIME
Refills: 0 | Status: DISCONTINUED | OUTPATIENT
Start: 2024-02-04 | End: 2024-02-07

## 2024-02-04 RX ORDER — LANOLIN ALCOHOL/MO/W.PET/CERES
3 CREAM (GRAM) TOPICAL AT BEDTIME
Refills: 0 | Status: DISCONTINUED | OUTPATIENT
Start: 2024-02-04 | End: 2024-02-07

## 2024-02-04 RX ORDER — ONDANSETRON 8 MG/1
4 TABLET, FILM COATED ORAL EVERY 8 HOURS
Refills: 0 | Status: DISCONTINUED | OUTPATIENT
Start: 2024-02-04 | End: 2024-02-07

## 2024-02-04 RX ORDER — LEVOTHYROXINE SODIUM 125 MCG
50 TABLET ORAL DAILY
Refills: 0 | Status: DISCONTINUED | OUTPATIENT
Start: 2024-02-04 | End: 2024-02-07

## 2024-02-04 RX ORDER — AMLODIPINE BESYLATE 2.5 MG/1
2.5 TABLET ORAL DAILY
Refills: 0 | Status: DISCONTINUED | OUTPATIENT
Start: 2024-02-04 | End: 2024-02-07

## 2024-02-04 NOTE — H&P ADULT - NSHPLABSRESULTS_GEN_ALL_CORE
15.6   7.85  )-----------( 173      ( 04 Feb 2024 19:54 )             44.5       02-04    141  |  104  |  19  ----------------------------<  99  3.9   |  25  |  1.18    Ca    8.7      04 Feb 2024 19:54    TPro  6.6  /  Alb  3.9  /  TBili  0.8  /  DBili  x   /  AST  36  /  ALT  32  /  AlkPhos  57  02-04              Urinalysis Basic - ( 04 Feb 2024 19:54 )    Color: x / Appearance: x / SG: x / pH: x  Gluc: 99 mg/dL / Ketone: x  / Bili: x / Urobili: x   Blood: x / Protein: x / Nitrite: x   Leuk Esterase: x / RBC: x / WBC x   Sq Epi: x / Non Sq Epi: x / Bacteria: x                  CAPILLARY BLOOD GLUCOSE

## 2024-02-04 NOTE — ED PROVIDER NOTE - OBJECTIVE STATEMENT
87 y/o m w/ PMH of AS s/p TAVR, CAD s/p DEWAYNE and CABG, hypothyroidism, HTN, HLD p/w Fall out of bed today while trying to go to the bathroom got pinned between the bed and the wall the family had to help get him up has no signs of trauma no real complaints able to actively range both hips at this time and knees ankles no signs of head injury at his baseline all labs were checked yesterday for completion sake pelvic x-ray was this time.

## 2024-02-04 NOTE — ED ADULT NURSE NOTE - OBJECTIVE STATEMENT
88 yr old male came in after he was d/c from the hospital yesterday for weakness as he was able to walk with a walker. when he woke up this am he felt too weak to get up so son brought him back to the hospital. on assessment a and o x 3 lungs clear abd soft non tender no swelling in extremities no n/v/d no fevers no other complaints but being unable to walk at home.

## 2024-02-04 NOTE — ED ADULT NURSE NOTE - NSFALLHARMRISKINTERV_ED_ALL_ED

## 2024-02-04 NOTE — ED PROVIDER NOTE - MUSCULOSKELETAL, MLM
Spine appears normal, range of motion is not limited, no muscle or joint tenderness, from of le active and passive, non tender, no signs of trauma

## 2024-02-04 NOTE — H&P ADULT - HISTORY OF PRESENT ILLNESS
87 y/o m w/ PMH of AS s/p TAVR, CAD s/p DEWAYNE and CABG, hypothyroidism, HTN, HLD, BPH.   Pt presented to ED on 2/3 with BLE weakness upon standing for several weeks, no h/o fall. Per ED documentation, they did not find neuro deficits except urinary retention had reed inserted?   Pt returns on 2/4 to ED after he fell while trying to get out of the bed.     Pt is able to move all extremities w/o pain.     In the ED, BP stable, HR on the lower side, on RA.   CBC and CMP unremarkable. VBG w/ mild lactic acidosis.   Pt found flu +.  87 y/o m w/ PMH of AS s/p TAVR, CAD s/p DEWAYNE and CABG, hypothyroidism, HTN, HLD, BPH.   Pt presented to ED on 2/3 with BLE weakness upon standing for several weeks, no h/o fall. Per ED documentation, they did not find neuro deficits except urinary retention had reed inserted and referred to uro op.   Pt returns on 2/4 to ED after he fell while trying to get out of the bed d/t leg weakness. Denies syncope but unwitnessed. No obvious injuries.   Pt is able to move all extremities w/o pain.   Pt also had been coughing with chest congestion but unable to bring up phlegm for few days.     In the ED, BP stable, HR on the lower side, on RA.   CBC and CMP unremarkable. VBG w/ mild lactic acidosis.   Pt found flu +.

## 2024-02-04 NOTE — H&P ADULT - PROBLEM SELECTOR PLAN 2
-given significant comorbidities and age, will give tamiflu x5days -coughing w/ chest congestion for few days.   -scattered wheezing on exam.   -ordered for albuterol prn   -given significant comorbidities and age, will give tamiflu x5days

## 2024-02-04 NOTE — H&P ADULT - NSHPADDITIONALINFOADULT_GEN_ALL_CORE
Fluid:   Electrolytes: replete potassium, phosphorus and magnesium to 4/3/2 respectively  Nutrition:  Activity: as tolerated     CODE STATUS: ??????????  Preferred contact:  ???????????    Med rec: ???????????  Done with patient verbally.  Done with written list of meds provided by the patient. Fluid: po intake   Electrolytes: replete potassium, phosphorus and magnesium to 4/3/2 respectively  Nutrition: dash   Activity: as tolerated     CODE STATUS: full code     Med rec:   Done with written list of meds provided by the patient's daughter

## 2024-02-04 NOTE — ED ADULT TRIAGE NOTE - ADDITIONAL SAFETY/BANDS...
8/29/2018         RE: Darwin Todd  [address] [facility] [address]        Dear Colleague,    Thank you for referring your patient, Darwin Todd, to the [facility]. Please see a copy of my visit note below.    Patient was here in clinic today.  He was not sure the reason that he is seeing endocrinology.  His understanding was he needs to be seen for sickle cell anemia.  I see there is referral for hematology and oncology placed by Dr. Esquivel.  I asked him to follow-up with hematology.  Follow-up with endocrinology if there are any concerns.  No charge.    I will forward note to .    Again, thank you for allowing me to participate in the care of your patient.        Sincerely,        Analilia Pepper MD    
Additional Safety/Bands:

## 2024-02-04 NOTE — H&P ADULT - NSICDXPASTMEDICALHX_GEN_ALL_CORE_FT
PAST MEDICAL HISTORY:  Aortic stenosis     Atrial fibrillation post-surgery, resolved    BPH (benign prostatic hyperplasia)     CAD (coronary artery disease)     Carpal tunnel syndrome of right wrist     [place] (hard of hearing) hearing aid bilateral    HTN (hypertension)     Hyperlipidemia     Hypothyroidism     Inguinal hernia, bilateral     Low back pain     Stented coronary artery 2004

## 2024-02-04 NOTE — H&P ADULT - NSHPPHYSICALEXAM_GEN_ALL_CORE
Vital Signs Last 24 Hrs  T(C): 37.4 (04 Feb 2024 19:58), Max: 37.4 (04 Feb 2024 19:58)  T(F): 99.3 (04 Feb 2024 19:58), Max: 99.3 (04 Feb 2024 19:58)  HR: 66 (04 Feb 2024 19:58) (59 - 66)  BP: 157/91 (04 Feb 2024 19:58) (124/70 - 157/91)  BP(mean): 113 (04 Feb 2024 19:58) (113 - 113)  RR: 18 (04 Feb 2024 19:58) (17 - 18)  SpO2: 94% (04 Feb 2024 19:58) (94% - 97%)    Parameters below as of 04 Feb 2024 19:58  Patient On (Oxygen Delivery Method): room air        CONSTITUTIONAL: Well-groomed, in no apparent distress  EYES: No conjunctival or scleral injection, non-icteric  ENMT: No external nasal lesions; MMM  RESPIRATORY: Breathing comfortably; lungs CTA without wheeze/rhonchi/rales  CARDIOVASCULAR: +S1S2, RRR, no M/G/R; pedal pulses full and symmetric; no lower extremity edema  GASTROINTESTINAL: No tenderness, +BS throughout, no rebound/guarding  SKIN: No rashes or ulcers noted  NEUROLOGIC: No gross focal neurological deficits, AAOX3  PSYCHIATRIC: mood and affect appropriate; appropriate insight and judgment Vital Signs Last 24 Hrs  T(C): 37.4 (04 Feb 2024 19:58), Max: 37.4 (04 Feb 2024 19:58)  T(F): 99.3 (04 Feb 2024 19:58), Max: 99.3 (04 Feb 2024 19:58)  HR: 66 (04 Feb 2024 19:58) (59 - 66)  BP: 157/91 (04 Feb 2024 19:58) (124/70 - 157/91)  BP(mean): 113 (04 Feb 2024 19:58) (113 - 113)  RR: 18 (04 Feb 2024 19:58) (17 - 18)  SpO2: 94% (04 Feb 2024 19:58) (94% - 97%)    Parameters below as of 04 Feb 2024 19:58  Patient On (Oxygen Delivery Method): room air        CONSTITUTIONAL: Well-groomed, in no apparent distress  EYES: No conjunctival or scleral injection, non-icteric  ENMT: No external nasal lesions; MMM  RESPIRATORY: Breathing comfortably; scattered exp wheezes, no rales  CARDIOVASCULAR: +S1S2, RRR, no M/G/R; pedal pulses full and symmetric; no lower extremity edema  GASTROINTESTINAL: No tenderness, +BS throughout, no rebound/guarding  SKIN: No rashes or ulcers noted  NEUROLOGIC: No gross focal neurological deficits, AAOX3  PSYCHIATRIC: mood and affect appropriate; appropriate insight and judgment

## 2024-02-04 NOTE — ED ADULT NURSE REASSESSMENT NOTE - NS ED NURSE REASSESS COMMENT FT1
Received report from RADHA Gu RN. Patient presenting to the ED with difficulty ambulating. Patient notes no pain at present. AOx2, disoriented to time. Daughter at bedside. Pt placed in position of comfort. Bed in lowest position, wheels locked, appropriate side rails raised. Pt denies needs at this time.

## 2024-02-05 LAB
ALBUMIN SERPL ELPH-MCNC: 3.8 G/DL — SIGNIFICANT CHANGE UP (ref 3.3–5)
ALP SERPL-CCNC: 54 U/L — SIGNIFICANT CHANGE UP (ref 40–120)
ALT FLD-CCNC: 31 U/L — SIGNIFICANT CHANGE UP (ref 10–45)
ANION GAP SERPL CALC-SCNC: 12 MMOL/L — SIGNIFICANT CHANGE UP (ref 5–17)
AST SERPL-CCNC: 40 U/L — SIGNIFICANT CHANGE UP (ref 10–40)
BASOPHILS # BLD AUTO: 0.02 K/UL — SIGNIFICANT CHANGE UP (ref 0–0.2)
BASOPHILS NFR BLD AUTO: 0.3 % — SIGNIFICANT CHANGE UP (ref 0–2)
BILIRUB SERPL-MCNC: 0.7 MG/DL — SIGNIFICANT CHANGE UP (ref 0.2–1.2)
BUN SERPL-MCNC: 19 MG/DL — SIGNIFICANT CHANGE UP (ref 7–23)
CALCIUM SERPL-MCNC: 9 MG/DL — SIGNIFICANT CHANGE UP (ref 8.4–10.5)
CHLORIDE SERPL-SCNC: 103 MMOL/L — SIGNIFICANT CHANGE UP (ref 96–108)
CO2 SERPL-SCNC: 22 MMOL/L — SIGNIFICANT CHANGE UP (ref 22–31)
CREAT SERPL-MCNC: 1.08 MG/DL — SIGNIFICANT CHANGE UP (ref 0.5–1.3)
EGFR: 66 ML/MIN/1.73M2 — SIGNIFICANT CHANGE UP
EOSINOPHIL # BLD AUTO: 0.12 K/UL — SIGNIFICANT CHANGE UP (ref 0–0.5)
EOSINOPHIL NFR BLD AUTO: 1.6 % — SIGNIFICANT CHANGE UP (ref 0–6)
GLUCOSE SERPL-MCNC: 102 MG/DL — HIGH (ref 70–99)
HCT VFR BLD CALC: 43.9 % — SIGNIFICANT CHANGE UP (ref 39–50)
HGB BLD-MCNC: 15.1 G/DL — SIGNIFICANT CHANGE UP (ref 13–17)
IMM GRANULOCYTES NFR BLD AUTO: 0.1 % — SIGNIFICANT CHANGE UP (ref 0–0.9)
LYMPHOCYTES # BLD AUTO: 1 K/UL — SIGNIFICANT CHANGE UP (ref 1–3.3)
LYMPHOCYTES # BLD AUTO: 13.7 % — SIGNIFICANT CHANGE UP (ref 13–44)
MAGNESIUM SERPL-MCNC: 2 MG/DL — SIGNIFICANT CHANGE UP (ref 1.6–2.6)
MCHC RBC-ENTMCNC: 30.6 PG — SIGNIFICANT CHANGE UP (ref 27–34)
MCHC RBC-ENTMCNC: 34.4 GM/DL — SIGNIFICANT CHANGE UP (ref 32–36)
MCV RBC AUTO: 88.9 FL — SIGNIFICANT CHANGE UP (ref 80–100)
MONOCYTES # BLD AUTO: 0.98 K/UL — HIGH (ref 0–0.9)
MONOCYTES NFR BLD AUTO: 13.4 % — SIGNIFICANT CHANGE UP (ref 2–14)
NEUTROPHILS # BLD AUTO: 5.19 K/UL — SIGNIFICANT CHANGE UP (ref 1.8–7.4)
NEUTROPHILS NFR BLD AUTO: 70.9 % — SIGNIFICANT CHANGE UP (ref 43–77)
NRBC # BLD: 0 /100 WBCS — SIGNIFICANT CHANGE UP (ref 0–0)
PHOSPHATE SERPL-MCNC: 3.1 MG/DL — SIGNIFICANT CHANGE UP (ref 2.5–4.5)
PLATELET # BLD AUTO: 153 K/UL — SIGNIFICANT CHANGE UP (ref 150–400)
POTASSIUM SERPL-MCNC: 3.8 MMOL/L — SIGNIFICANT CHANGE UP (ref 3.5–5.3)
POTASSIUM SERPL-SCNC: 3.8 MMOL/L — SIGNIFICANT CHANGE UP (ref 3.5–5.3)
PROT SERPL-MCNC: 6.3 G/DL — SIGNIFICANT CHANGE UP (ref 6–8.3)
RBC # BLD: 4.94 M/UL — SIGNIFICANT CHANGE UP (ref 4.2–5.8)
RBC # FLD: 14.4 % — SIGNIFICANT CHANGE UP (ref 10.3–14.5)
SODIUM SERPL-SCNC: 137 MMOL/L — SIGNIFICANT CHANGE UP (ref 135–145)
WBC # BLD: 7.32 K/UL — SIGNIFICANT CHANGE UP (ref 3.8–10.5)
WBC # FLD AUTO: 7.32 K/UL — SIGNIFICANT CHANGE UP (ref 3.8–10.5)

## 2024-02-05 PROCEDURE — 93306 TTE W/DOPPLER COMPLETE: CPT | Mod: 26

## 2024-02-05 RX ADMIN — Medication 50 MICROGRAM(S): at 06:18

## 2024-02-05 RX ADMIN — ALBUTEROL 2 PUFF(S): 90 AEROSOL, METERED ORAL at 06:13

## 2024-02-05 RX ADMIN — Medication 75 MILLIGRAM(S): at 00:25

## 2024-02-05 RX ADMIN — TAMSULOSIN HYDROCHLORIDE 0.4 MILLIGRAM(S): 0.4 CAPSULE ORAL at 22:10

## 2024-02-05 RX ADMIN — ATORVASTATIN CALCIUM 40 MILLIGRAM(S): 80 TABLET, FILM COATED ORAL at 22:10

## 2024-02-05 RX ADMIN — Medication 30 MILLIGRAM(S): at 18:18

## 2024-02-05 RX ADMIN — AMLODIPINE BESYLATE 2.5 MILLIGRAM(S): 2.5 TABLET ORAL at 06:16

## 2024-02-05 RX ADMIN — TAMSULOSIN HYDROCHLORIDE 0.4 MILLIGRAM(S): 0.4 CAPSULE ORAL at 00:24

## 2024-02-05 RX ADMIN — Medication 81 MILLIGRAM(S): at 14:49

## 2024-02-05 RX ADMIN — Medication 25 MILLIGRAM(S): at 18:18

## 2024-02-05 RX ADMIN — Medication 30 MILLIGRAM(S): at 06:17

## 2024-02-05 RX ADMIN — Medication 25 MILLIGRAM(S): at 06:15

## 2024-02-05 NOTE — CONSULT NOTE ADULT - SUBJECTIVE AND OBJECTIVE BOX
[facility] DIVISION OF INFECTIOUS DISEASES  RADHA Umanzor S. Shah, Y. Patel, G. [facility]  [phone]  ([phone] - weekdays after 5pm and weekends)    ALEXEI ROJAS  88y, Male  [id number]    HPI:  Patient is a 88 year old male with PMH of AS s/p TAVR, CAD s/p DEWAYNE and CABG, hypothyroidism, HTN, HLD, BPH. Pt presented to ED on 2/3 with BLE weakness upon standing for several weeks, no h/o fall. Per ED documentation, they did not find neuro deficits except urinary retention had reed inserted and referred to uro op. Pt returns on 2/4 to ED after he fell while trying to get out of the bed d/t leg weakness. Denies syncope but unwitnessed. No obvious injuries. Pt is able to move all extremities w/o pain.  Pt also had been coughing with chest congestion but unable to bring up phlegm for few days. In the ED, BP stable, HR on the lower side, on RA. CBC and CMP unremarkable. VBG w/ mild lactic acidosis. Pt found flu +.  (04 Feb 2024 22:29)  ROS: 14 point review of systems completed, pertinent positives and negatives as per HPI.    Allergies: No Known Allergies    PMH -- HTN (hypertension)  BPH (benign prostatic hyperplasia)  Hyperlipidemia  S/P coronary artery stent placement  Hypothyroidism  Inguinal hernia, bilateral  Carpal tunnel syndrome of right wrist  Atrial fibrillation  CAD (coronary artery disease)  Stented coronary artery  Low back pain  [place] (hard of hearing)  Aortic stenosis    PSH -- S/P coronary angioplastyS/P knee replacement, left  S/P CABG (coronary artery bypass graft)  S/P tonsillectomy  S/P hernia repair  History of knee replacement, total, bilateral  History of carpal tunnel surgery    FH -- Family history of hypertension  Family history of stroke  Family history of myocardial infarction    Social History -- denies tobacco, alcohol or illicit drug use    Physical Exam--  Vital Signs Last 24 Hrs  T(F): 97.9 (05 Feb 2024 06:15), Max: 99.3 (04 Feb 2024 19:58)  HR: 72 (05 Feb 2024 06:15) (59 - 72)  BP: 132/76 (05 Feb 2024 06:15) (124/70 - 159/77)  RR: 20 (05 Feb 2024 06:15) (17 - 20)  SpO2: 95% (05 Feb 2024 06:15) (94% - 97%)  General: no acute distress  HEENT: NC/AT, EOMI, anicteric, neck supple  Lungs: clear to auscultation bilaterally  Heart: S1, S2 present, normal rate   Abdomen: Soft. Nondistended. Nontender.  Neuro: AAOx, no obvious focal deficits   Extremities: No cyanosis or clubbing. No edema.   Skin: Warm. Dry. Good turgor. No visible rash.   Lines: PIV    Laboratory & Imaging Data--  CBC:                       15.1   7.32  )-----------( 153      ( 05 Feb 2024 06:30 )             43.9     WBC Count: 7.32 K/uL (02-05-24 @ 06:30)  WBC Count: 7.85 K/uL (02-04-24 @ 19:54)  WBC Count: 10.33 K/uL (02-03-24 @ 19:31)    CMP: 02-05    137  |  103  |  19  ----------------------------<  102<H>  3.8   |  22  |  1.08    Ca    9.0      05 Feb 2024 06:30  Phos  3.1     02-05  Mg     2.0     02-05    TPro  6.3  /  Alb  3.8  /  TBili  0.7  /  DBili  x   /  AST  40  /  ALT  31  /  AlkPhos  54  02-05    LIVER FUNCTIONS - ( 05 Feb 2024 06:30 )  Alb: 3.8 g/dL / Pro: 6.3 g/dL / ALK PHOS: 54 U/L / ALT: 31 U/L / AST: 40 U/L / GGT: x           Urinalysis Basic - ( 05 Feb 2024 06:30 )    Color: x / Appearance: x / SG: x / pH: x  Gluc: 102 mg/dL / Ketone: x  / Bili: x / Urobili: x   Blood: x / Protein: x / Nitrite: x   Leuk Esterase: x / RBC: x / WBC x   Sq Epi: x / Non Sq Epi: x / Bacteria: x      Microbiology: reviewed  Flu With COVID-19 By VINCE (02.04.24 @ 19:57)    SARS-CoV-2 Result: [facility]: This Respiratory Panel uses polymerase chain reaction (PCR) to detect for  influenza A; influenza B; respiratory syncytial virus; and SARS-CoV-2.  This test was validated by [facility] and is in use under the FDA  Emergency Use Authorization (EUA) for clinical labs CLIA-certified to  perform high complexity testing. Test results should be correlated with  clinical presentation, patient history, and epidemiology.   Influenza A Result: Detected   Influenza B Result: [facility]   Resp Syn Virus Result: [facility]      Radiology--reviewed    Active Medications--  acetaminophen     Tablet .. 650 milliGRAM(s) Oral every 6 hours PRN  albuterol    90 MICROgram(s) HFA Inhaler 2 Puff(s) Inhalation every 6 hours PRN  aluminum hydroxide/magnesium hydroxide/simethicone Suspension 30 milliLiter(s) Oral every 4 hours PRN  amLODIPine   Tablet 2.5 milliGRAM(s) Oral daily  aspirin enteric coated 81 milliGRAM(s) Oral daily  atorvastatin 40 milliGRAM(s) Oral at bedtime  levothyroxine 50 MICROGram(s) Oral daily  melatonin 3 milliGRAM(s) Oral at bedtime PRN  metoprolol tartrate 25 milliGRAM(s) Oral two times a day  ondansetron Injectable 4 milliGRAM(s) IV Push every 8 hours PRN  oseltamivir 30 milliGRAM(s) Oral two times a day  oseltamivir      tamsulosin 0.4 milliGRAM(s) Oral at bedtime    Current Antimicrobials:   oseltamivir 30 milliGRAM(s) Oral two times a day  oseltamivir        Prior/Completed Antimicrobials:  oseltamivir    Immunologic:      [facility] DIVISION OF INFECTIOUS DISEASES  RADHA Umanzor S. Shah, Y. Patel, G. [facility]  [phone]  ([phone] - weekdays after 5pm and weekends)    ALEXEI ROJAS  88y, Male  [id number]    HPI:  Patient is a 88 year old male with PMH of AS s/p TAVR, CAD s/p DEWAYNE and CABG, hypothyroidism, HTN, HLD, BPH who presented with weakness and fall. Patient had presented to ED on 2/3 with BLE weakness upon standing for several weeks, no h/o fall. Per ED documentation, they did not find neuro deficits except urinary retention had reed inserted and referred to uro op. Patient returns on 2/4 to ED after he fell while trying to get out of the bed d/t leg weakness. Denies syncope but unwitnessed. No obvious injuries. Pt is able to move all extremities w/o pain.  Pt also had been coughing with chest congestion but unable to bring up phlegm for few days. In the ED, BP stable, HR on the lower side, on RA. CBC and CMP unremarkable. VBG w/ mild lactic acidosis. Pt found flu +.  (04 Feb 2024 22:29)  Patient seen and examined at bedside this morning in the ER. Daughter at bedside assisted with history, both confirm above. Patients daughter reports patients wife and sister are also ill with the flu. Patient currently reports feeling better but weak. Has cough with congestion, not bring up much phlegm. Denies fever or chills, no body aches, chest pain, dyspnea, abdominal pain, nausea, vomiting, diarrhea. States he took his influenza vaccine in October.   ROS: 14 point review of systems completed, pertinent positives and negatives as per HPI.    Allergies: No Known Allergies    PMH -- HTN (hypertension)  BPH (benign prostatic hyperplasia)  Hyperlipidemia  S/P coronary artery stent placement  Hypothyroidism  Inguinal hernia, bilateral  Carpal tunnel syndrome of right wrist  Atrial fibrillation  CAD (coronary artery disease)  Stented coronary artery  Low back pain  [place] (hard of hearing)  Aortic stenosis    PSH -- S/P coronary angioplastyS/P knee replacement, left  S/P CABG (coronary artery bypass graft)  S/P tonsillectomy  S/P hernia repair  History of knee replacement, total, bilateral  History of carpal tunnel surgery    FH -- Family history of hypertension  Family history of stroke  Family history of myocardial infarction    Social History -- former smoker - quit 40 years ago, denies alcohol or illicit drug use, lives with spouse and sister is staying with them     Physical Exam--  Vital Signs Last 24 Hrs  T(F): 97.9 (05 Feb 2024 06:15), Max: 99.3 (04 Feb 2024 19:58)  HR: 72 (05 Feb 2024 06:15) (59 - 72)  BP: 132/76 (05 Feb 2024 06:15) (124/70 - 159/77)  RR: 20 (05 Feb 2024 06:15) (17 - 20)  SpO2: 95% (05 Feb 2024 06:15) (94% - 97%)  General: no acute distress  HEENT: NC/AT, EOMI, anicteric, neck supple  Lungs: clear to auscultation bilaterally  Heart: S1, S2 present, normal rate   Abdomen: Soft. Nondistended. Nontender.  Neuro: AAOx, no obvious focal deficits   Extremities: No cyanosis. No edema.   Skin: Warm. Dry. No visible rash.   Lines: PIV, reed leg bag with akshat urine draining     Laboratory & Imaging Data--  CBC:                       15.1   7.32  )-----------( 153      ( 05 Feb 2024 06:30 )             43.9     WBC Count: 7.32 K/uL (02-05-24 @ 06:30)  WBC Count: 7.85 K/uL (02-04-24 @ 19:54)  WBC Count: 10.33 K/uL (02-03-24 @ 19:31)    CMP: 02-05    137  |  103  |  19  ----------------------------<  102<H>  3.8   |  22  |  1.08    Ca    9.0      05 Feb 2024 06:30  Phos  3.1     02-05  Mg     2.0     02-05    TPro  6.3  /  Alb  3.8  /  TBili  0.7  /  DBili  x   /  AST  40  /  ALT  31  /  AlkPhos  54  02-05    LIVER FUNCTIONS - ( 05 Feb 2024 06:30 )  Alb: 3.8 g/dL / Pro: 6.3 g/dL / ALK PHOS: 54 U/L / ALT: 31 U/L / AST: 40 U/L / GGT: x           Urinalysis (02.03.24 @ 19:14)    pH Urine: 6.5   Glucose Qualitative, Urine: Negative mg/dL   Blood, Urine: Negative   Color: Yellow   Urine Appearance: Clear   Bilirubin: Negative   Ketone - Urine: Negative mg/dL   Specific Gravity: 1.018   Protein, Urine: 30 mg/dL   Urobilinogen: 0.2 mg/dL   Nitrite: Negative   Leukocyte Esterase Concentration: Negative  Urine Microscopic-Add On (NC) (02.03.24 @ 19:14)    Red Blood Cell - Urine: 1 /HPF   White Blood Cell - Urine: 0 /HPF   Bacteria: Negative /HPF   Epithelial Cells: 0 /HPF   Cast: 0 /LPF    Microbiology: reviewed  Flu With COVID-19 By VINCE (02.04.24 @ 19:57)    SARS-CoV-2 Result: [facility]: This Respiratory Panel uses polymerase chain reaction (PCR) to detect for  influenza A; influenza B; respiratory syncytial virus; and SARS-CoV-2.  This test was validated by [facility] and is in use under the FDA  Emergency Use Authorization (EUA) for clinical labs CLIA-certified to  perform high complexity testing. Test results should be correlated with  clinical presentation, patient history, and epidemiology.   Influenza A Result: Detected   [facility]   Resp Syn Virus Result: [facility]    Radiology--reviewed  < from: Xray Pelvis AP only (02.04.24 @ 20:17) >  IMPRESSION:  No acute fracture.    < end of copied text >  < from: Xray Chest 1 View- PORTABLE-Urgent (02.03.24 @ 19:44) >    IMPRESSION:  No focal consolidation.   No pneumothorax or acute fracture.    < end of copied text >  < from: Xray Pelvis AP only (02.03.24 @ 19:43) >  IMPRESSION:  No acute displaced fracture or dislocation.    < end of copied text >    Active Medications--  acetaminophen     Tablet .. 650 milliGRAM(s) Oral every 6 hours PRN  albuterol    90 MICROgram(s) HFA Inhaler 2 Puff(s) Inhalation every 6 hours PRN  aluminum hydroxide/magnesium hydroxide/simethicone Suspension 30 milliLiter(s) Oral every 4 hours PRN  amLODIPine   Tablet 2.5 milliGRAM(s) Oral daily  aspirin enteric coated 81 milliGRAM(s) Oral daily  atorvastatin 40 milliGRAM(s) Oral at bedtime  levothyroxine 50 MICROGram(s) Oral daily  melatonin 3 milliGRAM(s) Oral at bedtime PRN  metoprolol tartrate 25 milliGRAM(s) Oral two times a day  ondansetron Injectable 4 milliGRAM(s) IV Push every 8 hours PRN  oseltamivir 30 milliGRAM(s) Oral two times a day  oseltamivir      tamsulosin 0.4 milliGRAM(s) Oral at bedtime    Current Antimicrobials:   oseltamivir 30 milliGRAM(s) Oral two times a day  oseltamivir        Prior/Completed Antimicrobials:  oseltamivir

## 2024-02-05 NOTE — PHYSICAL THERAPY INITIAL EVALUATION ADULT - GENERAL OBSERVATIONS, REHAB EVAL
Patient received semi reclined on stretcher in ED< NAD, VSS, +tele, +PIV capped, +[place] bag to right leg, daughter present t/o

## 2024-02-05 NOTE — CONSULT NOTE ADULT - SUBJECTIVE AND OBJECTIVE BOX
DATE OF SERVICE: 02-05-24 @ 13:30    CHIEF COMPLAINT:Patient is a 88y old  Male who presents with a chief complaint of     HISTORY OF PRESENT ILLNESS:HPI:  87 y/o m w/ PMH of AS s/p TAVR, CAD s/p DEWAYNE and CABG, hypothyroidism, HTN, HLD, BPH.   Pt presented to ED on 2/3 with BLE weakness upon standing for several weeks, no h/o fall. Per ED documentation, they did not find neuro deficits except urinary retention had reed inserted and referred to uro op.   Pt returns on 2/4 to ED after he fell while trying to get out of the bed d/t leg weakness. Denies syncope but unwitnessed. No obvious injuries.   Pt is able to move all extremities w/o pain.   Pt also had been coughing with chest congestion but unable to bring up phlegm for few days.     In the ED, BP stable, HR on the lower side, on RA.   CBC and CMP unremarkable. VBG w/ mild lactic acidosis.   Pt found flu +.  (04 Feb 2024 22:29)      PAST MEDICAL & SURGICAL HISTORY:  HTN (hypertension)      BPH (benign prostatic hyperplasia)      Hyperlipidemia      Hypothyroidism      Inguinal hernia, bilateral      Carpal tunnel syndrome of right wrist      Atrial fibrillation  post-surgery, resolved      CAD (coronary artery disease)      Stented coronary artery  2004      Low back pain      [place] (hard of hearing)  hearing aid bilateral      Aortic stenosis      S/P coronary angioplasty  2004 drug eluting      S/P CABG (coronary artery bypass graft)  07/1996 CABG x3      S/P tonsillectomy  at age 5 years old      S/P hernia repair  inquinal 2005      History of knee replacement, total, bilateral  right 2016  left 2011      History of carpal tunnel surgery  2014              MEDICATIONS:  amLODIPine   Tablet 2.5 milliGRAM(s) Oral daily  aspirin enteric coated 81 milliGRAM(s) Oral daily  metoprolol tartrate 25 milliGRAM(s) Oral two times a day    oseltamivir 30 milliGRAM(s) Oral two times a day  oseltamivir        albuterol    90 MICROgram(s) HFA Inhaler 2 Puff(s) Inhalation every 6 hours PRN    acetaminophen     Tablet .. 650 milliGRAM(s) Oral every 6 hours PRN  melatonin 3 milliGRAM(s) Oral at bedtime PRN  ondansetron Injectable 4 milliGRAM(s) IV Push every 8 hours PRN    aluminum hydroxide/magnesium hydroxide/simethicone Suspension 30 milliLiter(s) Oral every 4 hours PRN    atorvastatin 40 milliGRAM(s) Oral at bedtime  levothyroxine 50 MICROGram(s) Oral daily    tamsulosin 0.4 milliGRAM(s) Oral at bedtime      FAMILY HISTORY:  Family history of hypertension    Family history of stroke    Family history of myocardial infarction        Non-contributory    SOCIAL HISTORY:    ex tobacco user quit 40 years ago   Allergies    No Known Allergies    Intolerances    	    REVIEW OF SYSTEMS:  CONSTITUTIONAL: No fever  EYES: No eye pain, visual disturbances, or discharge  ENMT:  + difficulty hearing, no tinnitus  NECK: No pain or stiffness  RESPIRATORY: No cough, wheezing,  CARDIOVASCULAR: No chest pain, palpitations, passing out, dizziness, or leg swelling  GASTROINTESTINAL:  No nausea, vomiting, no diarrhea, +  constipation. No melena.  GENITOURINARY: No dysuria, hematuria  NEUROLOGICAL: No stroke like symptoms  SKIN: No burning or lesions   ENDOCRINE: No heat or cold intolerance  MUSCULOSKELETAL: No joint pain or swelling  PSYCHIATRIC: No  anxiety, mood swings  HEME/LYMPH: No bleeding gums  ALLERGY AND IMMUNOLOGIC: No hives or eczema	    All other ROS negative    PHYSICAL EXAM:  T(C): 36.6 (02-05-24 @ 06:15), Max: 37.4 (02-04-24 @ 19:58)  HR: 60 (02-05-24 @ 10:57) (59 - 72)  BP: 130/78 (02-05-24 @ 10:57) (124/70 - 159/77)  RR: 20 (02-05-24 @ 06:15) (17 - 20)  SpO2: 95% (02-05-24 @ 10:57) (94% - 97%)  Wt(kg): --  I&O's Summary      Appearance: Normal	  HEENT:   Normal oral mucosa, EOMI	  Cardiovascular:  S1 S2, No JVD,    Respiratory: Lungs clear to auscultation	  Psychiatry: Alert  Gastrointestinal:  Soft, Non-tender, + BS	  Skin: No rashes   Neurologic: Non-focal  Extremities:  No edema  Vascular: Peripheral pulses palpable    	    	  	  CARDIAC MARKERS:  Labs personally reviewed by me                                  15.1   7.32  )-----------( 153      ( 05 Feb 2024 06:30 )             43.9     02-05    137  |  103  |  19  ----------------------------<  102<H>  3.8   |  22  |  1.08    Ca    9.0      05 Feb 2024 06:30  Phos  3.1     02-05  Mg     2.0     02-05    TPro  6.3  /  Alb  3.8  /  TBili  0.7  /  DBili  x   /  AST  40  /  ALT  31  /  AlkPhos  54  02-05          EKG: Personally reviewed by me - pending review non in patient chart   Radiology: Personally reviewed by me - No focal consolidation.   No pneumothorax or acute fracture.        Assessment /Plan:   87 y/o m w/ PMH of AS s/p TAVR, CAD s/p DEWAYNE and CABG, hypothyroidism, HTN, HLD, BPH.   Pt presented to ED on 2/3 with BLE weakness upon standing for several weeks, no h/o fall.     1. Fall  - ideology non cardiac related  - fall secondary to hip and leg weakness   - Maintain fall prevention, bed check     2. CAD s/p PCI and CABG  -ECG ordered   -patient has OP cardiologist   -c/w Statin, ASA, BB    3. AS s/p TAVR  -patient has OP EP   -loop recorder in place  -denies any palpitations, dizziness or previous falls.     4. HTN  -stable     5. HLD   check LDL  c/w Statin   LDL goal < 70     6. DVT prophylactic   -SCD's             Differential diagnosis and plan of care discussed with patient after the evaluation. Counseling on diet, nutritional counseling, weight management, exercise and medication compliance was done.   Advanced care planning/advanced directives discussed with patient/family. DNR status including forceful chest compressions to attempt to restart the heart, ventilator support/artificial breathing, electric shock, artificial nutrition, health care proxy, Molst form all discussed with pt. Pt wishes to consider. More than fifteen minutes spent on discussing advanced directives.     VERONICA Mckeon, DO [facility]  Cardiovascular Medicine  [address] [place] [address] Dr, [address]  Available for call or text via [employer] TE[employer]  Office [phone]   DATE OF SERVICE: 02-05-24 @ 13:30    CHIEF COMPLAINT:Patient is a 88y old  Male who presents with a chief complaint of     HISTORY OF PRESENT ILLNESS:HPI:  89 y/o m w/ PMH of AS s/p TAVR, CAD s/p DEWAYNE and CABG, hypothyroidism, HTN, HLD, BPH.   Pt presented to ED on 2/3 with BLE weakness upon standing for several weeks, no h/o fall. Per ED documentation, they did not find neuro deficits except urinary retention had reed inserted and referred to uro op.   Pt returns on 2/4 to ED after he fell while trying to get out of the bed d/t leg weakness. Denies syncope but unwitnessed. No obvious injuries.   Pt is able to move all extremities w/o pain.   Pt also had been coughing with chest congestion but unable to bring up phlegm for few days.     In the ED, BP stable, HR on the lower side, on RA.   CBC and CMP unremarkable. VBG w/ mild lactic acidosis.   Pt found flu +.  (04 Feb 2024 22:29)      PAST MEDICAL & SURGICAL HISTORY:  HTN (hypertension)      BPH (benign prostatic hyperplasia)      Hyperlipidemia      Hypothyroidism      Inguinal hernia, bilateral      Carpal tunnel syndrome of right wrist      Atrial fibrillation  post-surgery, resolved      CAD (coronary artery disease)      Stented coronary artery  2004      Low back pain      [place] (hard of hearing)  hearing aid bilateral      Aortic stenosis      S/P coronary angioplasty  2004 drug eluting      S/P CABG (coronary artery bypass graft)  07/1996 CABG x3      S/P tonsillectomy  at age 5 years old      S/P hernia repair  inquinal 2005      History of knee replacement, total, bilateral  right 2016  left 2011      History of carpal tunnel surgery  2014              MEDICATIONS:  amLODIPine   Tablet 2.5 milliGRAM(s) Oral daily  aspirin enteric coated 81 milliGRAM(s) Oral daily  metoprolol tartrate 25 milliGRAM(s) Oral two times a day    oseltamivir 30 milliGRAM(s) Oral two times a day  oseltamivir        albuterol    90 MICROgram(s) HFA Inhaler 2 Puff(s) Inhalation every 6 hours PRN    acetaminophen     Tablet .. 650 milliGRAM(s) Oral every 6 hours PRN  melatonin 3 milliGRAM(s) Oral at bedtime PRN  ondansetron Injectable 4 milliGRAM(s) IV Push every 8 hours PRN    aluminum hydroxide/magnesium hydroxide/simethicone Suspension 30 milliLiter(s) Oral every 4 hours PRN    atorvastatin 40 milliGRAM(s) Oral at bedtime  levothyroxine 50 MICROGram(s) Oral daily    tamsulosin 0.4 milliGRAM(s) Oral at bedtime      FAMILY HISTORY:  Family history of hypertension    Family history of stroke    Family history of myocardial infarction        Non-contributory    SOCIAL HISTORY:    ex tobacco user quit 40 years ago   Allergies    No Known Allergies    Intolerances    	    REVIEW OF SYSTEMS:  CONSTITUTIONAL: No fever  EYES: No eye pain, visual disturbances, or discharge  ENMT:  + difficulty hearing, no tinnitus  NECK: No pain or stiffness  RESPIRATORY: No cough, wheezing,  CARDIOVASCULAR: No chest pain, palpitations, passing out, dizziness, or leg swelling  GASTROINTESTINAL:  No nausea, vomiting, no diarrhea, +  constipation. No melena.  GENITOURINARY: No dysuria, hematuria  NEUROLOGICAL: No stroke like symptoms  SKIN: No burning or lesions   ENDOCRINE: No heat or cold intolerance  MUSCULOSKELETAL: No joint pain or swelling  PSYCHIATRIC: No  anxiety, mood swings  HEME/LYMPH: No bleeding gums  ALLERGY AND IMMUNOLOGIC: No hives or eczema	    All other ROS negative    PHYSICAL EXAM:  T(C): 36.6 (02-05-24 @ 06:15), Max: 37.4 (02-04-24 @ 19:58)  HR: 60 (02-05-24 @ 10:57) (59 - 72)  BP: 130/78 (02-05-24 @ 10:57) (124/70 - 159/77)  RR: 20 (02-05-24 @ 06:15) (17 - 20)  SpO2: 95% (02-05-24 @ 10:57) (94% - 97%)  Wt(kg): --  I&O's Summary      Appearance: Normal	  HEENT:   Normal oral mucosa, EOMI	  Cardiovascular:  S1 S2, No JVD,    Respiratory: Lungs clear to auscultation	  Psychiatry: Alert  Gastrointestinal:  Soft, Non-tender, + BS	  Skin: No rashes   Neurologic: Non-focal  Extremities:  No edema  Vascular: Peripheral pulses palpable    	    	  	  CARDIAC MARKERS:  Labs personally reviewed by me                                  15.1   7.32  )-----------( 153      ( 05 Feb 2024 06:30 )             43.9     02-05    137  |  103  |  19  ----------------------------<  102<H>  3.8   |  22  |  1.08    Ca    9.0      05 Feb 2024 06:30  Phos  3.1     02-05  Mg     2.0     02-05    TPro  6.3  /  Alb  3.8  /  TBili  0.7  /  DBili  x   /  AST  40  /  ALT  31  /  AlkPhos  54  02-05          EKG: Personally reviewed by me - pending review non in patient chart   Radiology: Personally reviewed by me - No focal consolidation.   No pneumothorax or acute fracture.        Assessment /Plan:   89 y/o m w/ PMH of AS s/p TAVR, CAD s/p DEWAYNE and CABG, hypothyroidism, HTN, HLD, BPH.   Pt presented to ED on 2/3 with BLE weakness upon standing for several weeks, no h/o fall.     1. Fall  - Etiology non cardiac related  - fall secondary to hip and leg weakness   - Maintain fall prevention, bed check     2. CAD s/p PCI and CABG  -ECG ordered   -Check TTE   -patient has OP cardiologist   -c/w Statin, ASA, BB    3. AS s/p TAVR  -patient has OP EP   -loop recorder in place  -denies any palpitations, dizziness or previous falls.     4. HTN  -stable     5. HLD   check LDL  c/w Statin   LDL goal < 70     6. DVT prophylactic   -SCD's             Differential diagnosis and plan of care discussed with patient after the evaluation. Counseling on diet, nutritional counseling, weight management, exercise and medication compliance was done.   Advanced care planning/advanced directives discussed with patient/family. DNR status including forceful chest compressions to attempt to restart the heart, ventilator support/artificial breathing, electric shock, artificial nutrition, health care proxy, Molst form all discussed with pt. Pt wishes to consider. More than fifteen minutes spent on discussing advanced directives.     VERONICA Mckeon,  [facility]  Cardiovascular Medicine  [address] [place] [address] Dr, [address]  Available for call or text via [employer] TE[employer]  Office [phone]   DATE OF SERVICE: 02-05-24 @ 13:30    CHIEF COMPLAINT:Patient is a 88y old  Male who presents with a chief complaint of weakness    HISTORY OF PRESENT ILLNESS:HPI:  87 y/o m w/ PMH of AS s/p TAVR, CAD s/p DEWAYNE and CABG, hypothyroidism, HTN, HLD, BPH.   Pt presented to ED on 2/3 with BLE weakness upon standing for several weeks, no h/o fall. Per ED documentation, they did not find neuro deficits except urinary retention had reed inserted and referred to uro op.   Pt returns on 2/4 to ED after he fell while trying to get out of the bed d/t leg weakness. Denies syncope but unwitnessed. No obvious injuries.   Pt is able to move all extremities w/o pain.   Pt also had been coughing with chest congestion but unable to bring up phlegm for few days.     In the ED, BP stable, HR on the lower side, on RA.   CBC and CMP unremarkable. VBG w/ mild lactic acidosis.   Pt found flu +.  (04 Feb 2024 22:29)      PAST MEDICAL & SURGICAL HISTORY:  HTN (hypertension)      BPH (benign prostatic hyperplasia)      Hyperlipidemia      Hypothyroidism      Inguinal hernia, bilateral      Carpal tunnel syndrome of right wrist      Atrial fibrillation  post-surgery, resolved      CAD (coronary artery disease)      Stented coronary artery  2004      Low back pain      [place] (hard of hearing)  hearing aid bilateral      Aortic stenosis      S/P coronary angioplasty  2004 drug eluting      S/P CABG (coronary artery bypass graft)  07/1996 CABG x3      S/P tonsillectomy  at age 5 years old      S/P hernia repair  inquinal 2005      History of knee replacement, total, bilateral  right 2016  left 2011      History of carpal tunnel surgery  2014              MEDICATIONS:  amLODIPine   Tablet 2.5 milliGRAM(s) Oral daily  aspirin enteric coated 81 milliGRAM(s) Oral daily  metoprolol tartrate 25 milliGRAM(s) Oral two times a day    oseltamivir 30 milliGRAM(s) Oral two times a day  oseltamivir        albuterol    90 MICROgram(s) HFA Inhaler 2 Puff(s) Inhalation every 6 hours PRN    acetaminophen     Tablet .. 650 milliGRAM(s) Oral every 6 hours PRN  melatonin 3 milliGRAM(s) Oral at bedtime PRN  ondansetron Injectable 4 milliGRAM(s) IV Push every 8 hours PRN    aluminum hydroxide/magnesium hydroxide/simethicone Suspension 30 milliLiter(s) Oral every 4 hours PRN    atorvastatin 40 milliGRAM(s) Oral at bedtime  levothyroxine 50 MICROGram(s) Oral daily    tamsulosin 0.4 milliGRAM(s) Oral at bedtime      FAMILY HISTORY:  Family history of hypertension    Family history of stroke    Family history of myocardial infarction        Non-contributory    SOCIAL HISTORY:    ex tobacco user quit 40 years ago   Allergies    No Known Allergies    Intolerances    	    REVIEW OF SYSTEMS:  CONSTITUTIONAL: No fever  EYES: No eye pain, visual disturbances, or discharge  ENMT:  + difficulty hearing, no tinnitus  NECK: No pain or stiffness  RESPIRATORY: No cough, wheezing,  CARDIOVASCULAR: No chest pain, palpitations, passing out, dizziness, or leg swelling  GASTROINTESTINAL:  No nausea, vomiting, no diarrhea, +  constipation. No melena.  GENITOURINARY: No dysuria, hematuria  NEUROLOGICAL: No stroke like symptoms  SKIN: No burning or lesions   ENDOCRINE: No heat or cold intolerance  MUSCULOSKELETAL: No joint pain or swelling  PSYCHIATRIC: No  anxiety, mood swings  HEME/LYMPH: No bleeding gums  ALLERGY AND IMMUNOLOGIC: No hives or eczema	    All other ROS negative    PHYSICAL EXAM:  T(C): 36.6 (02-05-24 @ 06:15), Max: 37.4 (02-04-24 @ 19:58)  HR: 60 (02-05-24 @ 10:57) (59 - 72)  BP: 130/78 (02-05-24 @ 10:57) (124/70 - 159/77)  RR: 20 (02-05-24 @ 06:15) (17 - 20)  SpO2: 95% (02-05-24 @ 10:57) (94% - 97%)  Wt(kg): --  I&O's Summary      Appearance: Normal	  HEENT:   Normal oral mucosa, EOMI	  Cardiovascular:  S1 S2, No JVD,    Respiratory: Lungs clear to auscultation	  Psychiatry: Alert  Gastrointestinal:  Soft, Non-tender, + BS	  Skin: No rashes   Neurologic: Non-focal  Extremities:  No edema  Vascular: Peripheral pulses palpable    	    	  	  CARDIAC MARKERS:  Labs personally reviewed by me                                  15.1   7.32  )-----------( 153      ( 05 Feb 2024 06:30 )             43.9     02-05    137  |  103  |  19  ----------------------------<  102<H>  3.8   |  22  |  1.08    Ca    9.0      05 Feb 2024 06:30  Phos  3.1     02-05  Mg     2.0     02-05    TPro  6.3  /  Alb  3.8  /  TBili  0.7  /  DBili  x   /  AST  40  /  ALT  31  /  AlkPhos  54  02-05          EKG: Personally reviewed by me - pending review non in patient chart   Radiology: Personally reviewed by me - No focal consolidation.   No pneumothorax or acute fracture.        Assessment /Plan:   87 y/o m w/ PMH of AS s/p TAVR, CAD s/p DEWAYNE and CABG, hypothyroidism, HTN, HLD, BPH.   Pt presented to ED on 2/3 with BLE weakness upon standing for several weeks, no h/o fall.     1. Fall  - Likely 2/2 Flu + with resultant hip and leg weakness   - Maintain fall prevention, bed check     2. CAD s/p PCI and CABG  -ECG ordered   -Check TTE   - Follows with OP cardiologist Dr Wai Grajeda  -c/w Statin, ASA, BB    3. AS s/p TAVR  -patient has OP EP   -loop recorder in place  -denies any palpitations, dizziness or previous falls.     4. HTN  -stable     5. HLD   check LDL  c/w Statin   LDL goal < 70     6. DVT prophylactic   -SCD's       OP FU with Dr Wai Grajeda          Differential diagnosis and plan of care discussed with patient after the evaluation. Counseling on diet, nutritional counseling, weight management, exercise and medication compliance was done.   Advanced care planning/advanced directives discussed with patient/family. DNR status including forceful chest compressions to attempt to restart the heart, ventilator support/artificial breathing, electric shock, artificial nutrition, health care proxy, Molst form all discussed with pt. Pt wishes to consider. More than fifteen minutes spent on discussing advanced directives.     VERONICA Mckeon, DO [facility]  Cardiovascular Medicine  [address], [address]  Available for call or text via [employer] TE[employer]  Office [phone]

## 2024-02-05 NOTE — PHYSICAL THERAPY INITIAL EVALUATION ADULT - PERTINENT HX OF CURRENT PROBLEM, REHAB EVAL
87 y/o m w/ PMH of AS s/p TAVR, CAD s/p DEWAYNE and CABG, hypothyroidism, HTN, HLD, BPH.  Pt presented to ED on 2/3 with BLE weakness upon standing for several weeks. Pt returns on 2/4 to ED after he fell while trying to get out of the bed d/t leg weakness  2/4/24: Xray pelvis: No acute fracture.

## 2024-02-05 NOTE — CONSULT NOTE ADULT - ASSESSMENT
Patient is a 88 year old male with PMH of AS s/p TAVR, CAD s/p DEWAYNE and CABG, hypothyroidism, HTN, HLD, BPH, former smoker who had presented on 2/3 with b/l lower extremity weakness upon standing for several weakness, noted with urinary retention and s/p reed placement who now presented with weakness and fall from bed and tested positive for influenza. Patient wife and sister also with influenza. He took his influenza vaccine in Oct 2023.     Influenza A  Weakness, s/p fall   Urinary retention s/p reed, BPH  - CXR with no focal consolidations  - 2/3 UA negative for pyuria   - reed leg bag draining clear akshat urine   - afebrile here, WBC wnl    Recommendations:  Continue on tamiflu to complete total 5d course on 2/9 am  No indication for antibiotics at this time   Supportive care, supplemental O2 as needed   Cardiology following, TTE report pending   Reed care, TOV per primary team   Aspiration precautions       D/w daughter at bedside  Kavon Lima M.D.  [facility], Division of Infectious Diseases  [phone]  After 5pm on weekdays and all day on weekends - please call [phone]

## 2024-02-05 NOTE — ED ADULT NURSE REASSESSMENT NOTE - NS ED NURSE REASSESS COMMENT FT1
Report received from Asha HYMAN. Pt A&Ox4, in no acute distress at this time. Awaiting bed assignment. Patient safety maintained, bed is in lowest position, wheels locked, and side rails raised. Patient oriented to call bell, and call bell is within reach.

## 2024-02-05 NOTE — PHYSICAL THERAPY INITIAL EVALUATION ADULT - ADDITIONAL COMMENTS
Patient lives in private home with spouse, no stairs to enter, 12 steps to bedroom. Patient ambulates with cane or rollator, recently using rollator 2/2 weakness. Patient independent with ADLs

## 2024-02-05 NOTE — PROGRESS NOTE ADULT - SUBJECTIVE AND OBJECTIVE BOX
SUBJECTIVE / OVERNIGHT EVENTS:      No events noted overnight. Resting in bed        --------------------------------------------------------------------------------------------  LABS:                        15.1   7.32  )-----------( 153      ( 05 Feb 2024 06:30 )             43.9     02-05    137  |  103  |  19  ----------------------------<  102<H>  3.8   |  22  |  1.08    Ca    9.0      05 Feb 2024 06:30  Phos  3.1     02-05  Mg     2.0     02-05    TPro  6.3  /  Alb  3.8  /  TBili  0.7  /  DBili  x   /  AST  40  /  ALT  31  /  AlkPhos  54  02-05      CAPILLARY BLOOD GLUCOSE            Urinalysis Basic - ( 05 Feb 2024 06:30 )    Color: x / Appearance: x / SG: x / pH: x  Gluc: 102 mg/dL / Ketone: x  / Bili: x / Urobili: x   Blood: x / Protein: x / Nitrite: x   Leuk Esterase: x / RBC: x / WBC x   Sq Epi: x / Non Sq Epi: x / Bacteria: x        RADIOLOGY & ADDITIONAL TESTS: < from: Xray Pelvis AP only (02.04.24 @ 20:17) >  IMPRESSION:  No acute fracture.    < end of copied text >    < from: Xray Chest 1 View- PORTABLE-Urgent (02.03.24 @ 19:44) >  IMPRESSION:  No focal consolidation.   No pneumothorax or acute fracture.    < end of copied text >    Imaging Personally Reviewed:  [x] YES  [ ] NO    Consultant(s) Notes Reviewed:  [x] YES  [ ] NO    MEDICATIONS  (STANDING):  amLODIPine   Tablet 2.5 milliGRAM(s) Oral daily  aspirin enteric coated 81 milliGRAM(s) Oral daily  atorvastatin 40 milliGRAM(s) Oral at bedtime  levothyroxine 50 MICROGram(s) Oral daily  metoprolol tartrate 25 milliGRAM(s) Oral two times a day  oseltamivir      oseltamivir 30 milliGRAM(s) Oral two times a day  tamsulosin 0.4 milliGRAM(s) Oral at bedtime    MEDICATIONS  (PRN):  acetaminophen     Tablet .. 650 milliGRAM(s) Oral every 6 hours PRN Temp greater or equal to 38C (100.4F), Mild Pain (1 - 3)  albuterol    90 MICROgram(s) HFA Inhaler 2 Puff(s) Inhalation every 6 hours PRN Shortness of Breath and/or Wheezing  aluminum hydroxide/magnesium hydroxide/simethicone Suspension 30 milliLiter(s) Oral every 4 hours PRN Dyspepsia  melatonin 3 milliGRAM(s) Oral at bedtime PRN Insomnia  ondansetron Injectable 4 milliGRAM(s) IV Push every 8 hours PRN Nausea and/or Vomiting      Care Discussed with Consultants/Other Providers [x] YES  [ ] NO    Vital Signs Last 24 Hrs  T(C): 36.6 (05 Feb 2024 06:15), Max: 37.4 (04 Feb 2024 19:58)  T(F): 97.9 (05 Feb 2024 06:15), Max: 99.3 (04 Feb 2024 19:58)  HR: 72 (05 Feb 2024 06:15) (59 - 72)  BP: 132/76 (05 Feb 2024 06:15) (124/70 - 159/77)  BP(mean): 113 (04 Feb 2024 19:58) (113 - 113)  RR: 20 (05 Feb 2024 06:15) (17 - 20)  SpO2: 95% (05 Feb 2024 06:15) (94% - 97%)    Parameters below as of 05 Feb 2024 06:15  Patient On (Oxygen Delivery Method): room air      I&O's Summary      PHYSICAL EXAM:  GENERAL: NAD, well-developed, comfortable  HEAD:  Atraumatic, Normocephalic  EYES: EOMI, PERRLA, conjunctiva and sclera clear  NECK: Supple, No JVD  CHEST/LUNG: Diminished bilaterally; slight wheeze  HEART: Regular rate and rhythm; No murmurs, rubs, or gallops  ABDOMEN: Soft, Nontender, Nondistended; Bowel sounds present  NEURO: AAOx3, no focal weakness, 5/5 b/l extremity strength, b/l knee no arthritis, no effusion   EXTREMITIES:  2+ Peripheral Pulses, No clubbing, cyanosis, or edema  SKIN: No rashes or lesions.

## 2024-02-06 ENCOUNTER — TRANSCRIPTION ENCOUNTER (OUTPATIENT)
Age: 89
End: 2024-02-06

## 2024-02-06 RX ADMIN — Medication 81 MILLIGRAM(S): at 11:32

## 2024-02-06 RX ADMIN — TAMSULOSIN HYDROCHLORIDE 0.4 MILLIGRAM(S): 0.4 CAPSULE ORAL at 20:03

## 2024-02-06 RX ADMIN — Medication 30 MILLIGRAM(S): at 06:15

## 2024-02-06 RX ADMIN — Medication 25 MILLIGRAM(S): at 17:25

## 2024-02-06 RX ADMIN — ATORVASTATIN CALCIUM 40 MILLIGRAM(S): 80 TABLET, FILM COATED ORAL at 21:03

## 2024-02-06 RX ADMIN — Medication 30 MILLIGRAM(S): at 17:25

## 2024-02-06 NOTE — DISCHARGE NOTE PROVIDER - NSFOLLOWUPCLINICS_GEN_ALL_ED_FT
[facility]  [facility]rology  [address] [place] [address]  Phone: [phone]  Fax:   Follow Up Time: 1 week

## 2024-02-06 NOTE — PATIENT PROFILE ADULT - HAVE YOU BEEN EATING POORLY BECAUSE OF A DECREASED APPETITE?
No (0)

## 2024-02-06 NOTE — DISCHARGE NOTE PROVIDER - NSDCFUADDAPPT_GEN_ALL_CORE_FT
APPTS ARE READY TO BE MADE: [x] YES    Best Family or Patient Contact (if needed):    Additional Information about above appointments (if needed):    1:   2:   3:     Other comments or requests:    APPTS ARE READY TO BE MADE: [x] YES    Best Family or Patient Contact (if needed):    Additional Information about above appointments (if needed):    1:   2:   3:     Other comments or requests:     Appointment was scheduled but is not visible on [facility]. Dr. Grajeda on 2/14 at 2:30pm    Appointment was scheduled in [facility] on 3/22 at 8:20 am

## 2024-02-06 NOTE — DISCHARGE NOTE NURSING/CASE MANAGEMENT/SOCIAL WORK - NSDCPEFALRISK_GEN_ALL_CORE
For information on Fall & Injury Prevention, visit: [email or web address] OR  [email or web address] OR  https://www.cdc.gov/steadi/patient.html

## 2024-02-06 NOTE — DISCHARGE NOTE PROVIDER - CARE PROVIDER_API CALL
Wai Grajeda  Internal Medicine  [address] [place] [address], [facility] [address]  [place] [address]  Phone: [phone]  Fax: [phone]  Follow Up Time: 1 week

## 2024-02-06 NOTE — PROGRESS NOTE ADULT - SUBJECTIVE AND OBJECTIVE BOX
DATE OF SERVICE: 02-06-24 @ 22:18    Patient is a 88y old  Male who presents with a chief complaint of Fall (06 Feb 2024 17:57)      INTERVAL HISTORY: feels ok    	  MEDICATIONS:  amLODIPine   Tablet 2.5 milliGRAM(s) Oral daily  metoprolol tartrate 25 milliGRAM(s) Oral two times a day        PHYSICAL EXAM:  T(C): 36.3 (02-06-24 @ 21:50), Max: 37 (02-06-24 @ 00:22)  HR: 67 (02-06-24 @ 21:50) (60 - 77)  BP: 148/85 (02-06-24 @ 21:50) (107/69 - 148/85)  RR: 18 (02-06-24 @ 21:50) (17 - 18)  SpO2: 96% (02-06-24 @ 21:50) (94% - 96%)  Wt(kg): --  I&O's Summary    05 Feb 2024 07:01  -  06 Feb 2024 07:00  --------------------------------------------------------  IN: 0 mL / OUT: 150 mL / NET: -150 mL          Appearance: In no distress	  HEENT:    PERRL, EOMI	  Cardiovascular:  S1 S2, No JVD  Respiratory: Lungs clear to auscultation	  Gastrointestinal:  Soft, Non-tender, + BS	  Vascularature:  No edema of LE  Psychiatric: Appropriate affect   Neuro: no acute focal deficits                               15.1   7.32  )-----------( 153      ( 05 Feb 2024 06:30 )             43.9     02-05    137  |  103  |  19  ----------------------------<  102<H>  3.8   |  22  |  1.08    Ca    9.0      05 Feb 2024 06:30  Phos  3.1     02-05  Mg     2.0     02-05    TPro  6.3  /  Alb  3.8  /  TBili  0.7  /  DBili  x   /  AST  40  /  ALT  31  /  AlkPhos  54  02-05        Labs personally reviewed      EKG: Personally reviewed by me - pending review non in patient chart   Radiology: Personally reviewed by me - No focal consolidation.   No pneumothorax or acute fracture.        Assessment /Plan:   89 y/o m w/ PMH of AS s/p TAVR, CAD s/p DEWAYNE and CABG, hypothyroidism, HTN, HLD, BPH.   Pt presented to ED on 2/3 with BLE weakness upon standing for several weeks, no h/o fall.     1. Fall  - Likely 2/2 Flu + with resultant hip and leg weakness   - Maintain fall prevention, bed check     2. CAD s/p PCI and CABG  -ECG ordered   -Check TTE, may be done as OP  - Follows with OP cardiologist Dr Wai Grajeda  -c/w Statin, ASA, BB    3. AS s/p TAVR  - OP TTE     4. HTN  -stable     5. HLD   check LDL  c/w Statin   LDL goal < 70     6. DVT prophylactic   -SCD's       OP FU with Dr Wai Maya DO [facility]  Cardiovascular Medicine  [address], [address]  Office: [phone]  Available via Text/call on Microsoft Teams DATE OF SERVICE: 02-06-24 @ 22:18    Patient is a 88y old  Male who presents with a chief complaint of Fall (06 Feb 2024 17:57)      INTERVAL HISTORY: feels ok    	  MEDICATIONS:  amLODIPine   Tablet 2.5 milliGRAM(s) Oral daily  metoprolol tartrate 25 milliGRAM(s) Oral two times a day        PHYSICAL EXAM:  T(C): 36.3 (02-06-24 @ 21:50), Max: 37 (02-06-24 @ 00:22)  HR: 67 (02-06-24 @ 21:50) (60 - 77)  BP: 148/85 (02-06-24 @ 21:50) (107/69 - 148/85)  RR: 18 (02-06-24 @ 21:50) (17 - 18)  SpO2: 96% (02-06-24 @ 21:50) (94% - 96%)  Wt(kg): --  I&O's Summary    05 Feb 2024 07:01  -  06 Feb 2024 07:00  --------------------------------------------------------  IN: 0 mL / OUT: 150 mL / NET: -150 mL          Appearance: In no distress	  HEENT:    PERRL, EOMI	  Cardiovascular:  S1 S2, No JVD  Respiratory: Lungs clear to auscultation	  Gastrointestinal:  Soft, Non-tender, + BS	  Vascularature:  No edema of LE  Psychiatric: Appropriate affect   Neuro: no acute focal deficits                               15.1   7.32  )-----------( 153      ( 05 Feb 2024 06:30 )             43.9     02-05    137  |  103  |  19  ----------------------------<  102<H>  3.8   |  22  |  1.08    Ca    9.0      05 Feb 2024 06:30  Phos  3.1     02-05  Mg     2.0     02-05    TPro  6.3  /  Alb  3.8  /  TBili  0.7  /  DBili  x   /  AST  40  /  ALT  31  /  AlkPhos  54  02-05        Labs personally reviewed      EKG: Personally reviewed by me - pending review non in patient chart   Radiology: Personally reviewed by me - No focal consolidation.   No pneumothorax or acute fracture.      TTE CONCLUSIONS:      1. Left ventricular cavity is normal. Left ventricular wall thickness is normal. Left ventricular systolic function is normal with an ejection fraction of 62 % by Neri's method of disks. There are no regional wall motion abnormalities seen.   2. Normal left ventricular diastolic function, with normal filling pressure.   3. Normal right ventricular cavity size and normal systolic function.   4. Normal left and right atrial size.   5. There is trace tricuspid regurgitation. Estimated pulmonary artery systolic pressure is 26 mmHg.   6. There is trace mitral regurgitation.   7. A bioprosthetic valve replacement is present in the aortic position ( a transcatheter deployed (TAVR). There is no intravalvular regurgitation. There is no paravalvular regurgitation. The aortic valve acceleration time is 106 msec. The peak transaortic velocity is 2.52 m/s, peak transaortic gradient is 25.4 mmHg and mean transaortic gradient is 12.1 mmHg with an LVOT/aortic valve VTI ratio of 0.43. The aortic valve acceleration time is 106 msec. The aortic valve area is estimated at 1.78 cm² by the continuity equation. There is no evidence of aortic regurgitation.   8. No pericardial effusion seen.   9. No prior echocardiogram is available for comparison      Assessment /Plan:   87 y/o m w/ PMH of AS s/p TAVR, CAD s/p DEWAYNE and CABG, hypothyroidism, HTN, HLD, BPH.   Pt presented to ED on 2/3 with BLE weakness upon standing for several weeks, no h/o fall.     1. Fall  - Likely 2/2 Flu + with resultant hip and leg weakness   - Maintain fall prevention, bed check     2. CAD s/p PCI and CABG  -ECG ordered   - TTE unremarkable  - Follows with OP cardiologist Dr Wai Grajeda  -c/w Statin, ASA, BB    3. AS s/p TAVR  - TTE with well seated valve    4. HTN  -stable     5. HLD   check LDL  c/w Statin   LDL goal < 70     6. DVT prophylactic   -SCD's       OP FU with Dr Wai Maya DO [facility]  Cardiovascular Medicine  [address], [address]  Office: [phone]  Available via Text/call on Microsoft Teams

## 2024-02-06 NOTE — DISCHARGE NOTE PROVIDER - HOSPITAL COURSE
HPI:  89 y/o m w/ PMH of AS s/p TAVR, CAD s/p DEWAYNE and CABG, hypothyroidism, HTN, HLD, BPH.   Pt presented to ED on 2/3 with BLE weakness upon standing for several weeks, no h/o fall. Per ED documentation, they did not find neuro deficits except urinary retention had reed inserted and referred to uro op.   Pt returns on 2/4 to ED after he fell while trying to get out of the bed d/t leg weakness. Denies syncope but unwitnessed. No obvious injuries.   Pt is able to move all extremities w/o pain.   Pt also had been coughing with chest congestion but unable to bring up phlegm for few days.     In the ED, BP stable, HR on the lower side, on RA.   CBC and CMP unremarkable. VBG w/ mild lactic acidosis.   Pt found flu +.  (04 Feb 2024 22:29)    Hospital Course:  Patient was admitted to ER s/p fall.  XR pelvis negative for fracture, fall precautions in place. Cardiology consulted, fall likely 2/2 influenza with resultant hip and leg weakness.  TTE WNL. Recommended for OP follow up with Dr. Grajeda. Infectious disease consulted, afebrile here, WBC wnl, feeling better. Continue tamiflu, no indication for antibiotics and contiue supportive care.  Patient initiated TOV ______    Patient was seen by physical therapy, recommended for ROSITA. Family declined ROSITA and opted for Home with Home PT.  Home PT set up per case management.     Patient is medically cleared for discharge. Medication reconciliation reviewed, revised, and resolved with Dr. Reyes who had medically cleared patient for discharge with follow-up as advised. Patient is currently stable for discharge to home at this time.    Important Medication Changes and Reason:  - c/w Tamiflu till 2/9    Active or Pending Issues Requiring Follow-up:  - f/u with outpatient cardiologist Dr Wai Grajeda    Advanced Directives:   [x] Full code  [ ] DNR  [ ] Hospice    Discharge Diagnoses:  - Fall  - Influenza A       HPI:  87 y/o m w/ PMH of AS s/p TAVR, CAD s/p DEWAYNE and CABG, hypothyroidism, HTN, HLD, BPH.   Pt presented to ED on 2/3 with BLE weakness upon standing for several weeks, no h/o fall. Per ED documentation, they did not find neuro deficits except urinary retention had reed inserted and referred to uro op.   Pt returns on 2/4 to ED after he fell while trying to get out of the bed d/t leg weakness. Denies syncope but unwitnessed. No obvious injuries.   Pt is able to move all extremities w/o pain.   Pt also had been coughing with chest congestion but unable to bring up phlegm for few days.     In the ED, BP stable, HR on the lower side, on RA.   CBC and CMP unremarkable. VBG w/ mild lactic acidosis.   Pt found flu +.  (04 Feb 2024 22:29)    Hospital Course:  Patient was admitted to ER s/p fall.  XR pelvis negative for fracture, fall precautions in place. Cardiology consulted, fall likely 2/2 influenza with resultant hip and leg weakness.  TTE WNL. Recommended for OP follow up with Dr. Grajeda. Infectious disease consulted, afebrile here, WBC wnl, feeling better. Continue tamiflu, no indication for antibiotics and contiue supportive care.  Patient initiated TOV . failed. Reed reinserted    Patient was seen by physical therapy, recommended for ROSITA. Family declined ROSITA and opted for Home with Home PT.  Home PT set up per case management.     Patient is medically cleared for discharge. Medication reconciliation reviewed, revised, and resolved with Dr. Reyes who had medically cleared patient for discharge with follow-up as advised. Patient is currently stable for discharge to home at this time.    Important Medication Changes and Reason:  - c/w Tamiflu till 2/9    Active or Pending Issues Requiring Follow-up:  - f/u with outpatient cardiologist Dr Wai Grajeda    Advanced Directives:   [x] Full code  [ ] DNR  [ ] Hospice    Discharge Diagnoses:  - Fall  - Influenza A  - urinary retention  -AS s/p TAVR  -CAD s/p DEWAYNE and CABG  - hypothyroidism  - HTN  -HLD  - BPH.

## 2024-02-06 NOTE — DISCHARGE NOTE PROVIDER - NSDCMRMEDTOKEN_GEN_ALL_CORE_FT
aspirin 81 mg oral delayed release tablet: 1 tab(s) orally once a day    NOTE: as per pt, pt does not take aspirin if pt has taken aleve.  atorvastatin 40 mg oral tablet: 1 tab(s) orally once a day (at bedtime)  levothyroxine 50 mcg (0.05 mg) oral tablet: 1 tab(s) orally once a day  metoprolol tartrate 25 mg oral tablet: 1 tab(s) orally 2 times a day MDD:2  Norvasc 2.5 mg oral tablet: 1 tab(s) orally once a day   aspirin 81 mg oral delayed release tablet: 1 tab(s) orally once a day    NOTE: as per pt, pt does not take aspirin if pt has taken aleve.  atorvastatin 40 mg oral tablet: 1 tab(s) orally once a day (at bedtime)  levothyroxine 50 mcg (0.05 mg) oral tablet: 1 tab(s) orally once a day  metoprolol tartrate 25 mg oral tablet: 1 tab(s) orally 2 times a day MDD:2  Norvasc 2.5 mg oral tablet: 1 tab(s) orally once a day  oseltamivir 30 mg oral capsule: 1 cap(s) orally 2 times a day

## 2024-02-06 NOTE — DISCHARGE NOTE PROVIDER - NSDCFUSCHEDAPPT_GEN_ALL_CORE_FT
Bart De Jesus  [facility] Physician [facility]  UROLOGY [address] [facility]  Scheduled Appointment: 03/22/2024

## 2024-02-06 NOTE — DISCHARGE NOTE NURSING/CASE MANAGEMENT/SOCIAL WORK - PATIENT PORTAL LINK FT
You can access the FollowMyHealth Patient Portal offered by [facility] by registering at the following website: [email or web address]. By joining [employer]’s FollowMyHealth portal, you will also be able to view your health information using other applications (apps) compatible with our system.

## 2024-02-06 NOTE — PROGRESS NOTE ADULT - SUBJECTIVE AND OBJECTIVE BOX
[facility] DIVISION OF INFECTIOUS DISEASES  RADHA Umaznor Y. Patel, S. Shah, G. Judd  [phone]  ([phone] - weekdays after 5pm and weekends)    Name: ALEXEI ROJAS  Age/Gender: 88y Male  MRN: [id number]    Interval History:  Patient seen and examined this morning.   States he feels much better   No new complaints noted.  Notes reviewed  No concerning overnight events  Afebrile   Allergies: No Known Allergies      Objective:  Vitals:   T(F): 98.2 (02-06-24 @ 09:41), Max: 98.7 (02-05-24 @ 19:25)  HR: 75 (02-06-24 @ 09:41) (59 - 75)  BP: 135/87 (02-06-24 @ 09:41) (113/72 - 150/82)  RR: 17 (02-06-24 @ 09:41) (17 - 20)  SpO2: 95% (02-06-24 @ 09:41) (94% - 98%)  Physical Examination:  General: no acute distress, RA, nontoxic appearing   HEENT: NC/AT, anicteric, neck supple  Respiratory: no acc muscle use, breathing comfortably  Cardiovascular: S1 and S2 present  Gastrointestinal: normal appearing, nondistended  Extremities: no edema, no cyanosis  Skin: no visible rash  [place] leg bag recently emptied per pt     Laboratory Studies:  CBC:                       15.1   7.32  )-----------( 153      ( 05 Feb 2024 06:30 )             43.9     WBC Trend:  7.32 02-05-24 @ 06:30  7.85 02-04-24 @ 19:54  10.33 02-03-24 @ 19:31    CMP: 02-05    137  |  103  |  19  ----------------------------<  102<H>  3.8   |  22  |  1.08    Ca    9.0      05 Feb 2024 06:30  Phos  3.1     02-05  Mg     2.0     02-05    TPro  6.3  /  Alb  3.8  /  TBili  0.7  /  DBili  x   /  AST  40  /  ALT  31  /  AlkPhos  54  02-05    Creatinine: 1.08 mg/dL (02-05-24 @ 06:30)  Creatinine: 1.18 mg/dL (02-04-24 @ 19:54)  Creatinine: 1.22 mg/dL (02-03-24 @ 19:31)    LIVER FUNCTIONS - ( 05 Feb 2024 06:30 )  Alb: 3.8 g/dL / Pro: 6.3 g/dL / ALK PHOS: 54 U/L / ALT: 31 U/L / AST: 40 U/L / GGT: x           Microbiology: reviewed   SARS-CoV-2 Result: NotDetec (04 Feb 2024 19:57)    Radiology: reviewed     Medications:  acetaminophen     Tablet .. 650 milliGRAM(s) Oral every 6 hours PRN  albuterol    90 MICROgram(s) HFA Inhaler 2 Puff(s) Inhalation every 6 hours PRN  aluminum hydroxide/magnesium hydroxide/simethicone Suspension 30 milliLiter(s) Oral every 4 hours PRN  amLODIPine   Tablet 2.5 milliGRAM(s) Oral daily  aspirin enteric coated 81 milliGRAM(s) Oral daily  atorvastatin 40 milliGRAM(s) Oral at bedtime  levothyroxine 50 MICROGram(s) Oral daily  melatonin 3 milliGRAM(s) Oral at bedtime PRN  metoprolol tartrate 25 milliGRAM(s) Oral two times a day  ondansetron Injectable 4 milliGRAM(s) IV Push every 8 hours PRN  oseltamivir 30 milliGRAM(s) Oral two times a day  oseltamivir      tamsulosin 0.4 milliGRAM(s) Oral at bedtime    Current Antimicrobials:  oseltamivir 30 milliGRAM(s) Oral two times a day  oseltamivir        Prior/Completed Antimicrobials:  oseltamivir

## 2024-02-06 NOTE — DISCHARGE NOTE PROVIDER - NSDCCPCAREPLAN_GEN_ALL_CORE_FT
PRINCIPAL DISCHARGE DIAGNOSIS  Diagnosis: Fall in home  Assessment and Plan of Treatment: Causes of fall may be due to illness, change in the medicines you take, unsafe or unfamiliar setting and conditions that affect eyesight, hearing, muscle strength, or balance.                                                            Certain medicines used for sleep, anxiety, or depression can cause falls. Adding new medicines, or changing doses of some medicines, can also affect your risk of falling. Your doctor might switch you to a different medicine.  Prevent falls by make your home safer – To avoid falling at home, get rid of things that might make you trip or slip. This might include furniture, electrical cords, clutter, and loose rugs. Keep your home well lit to avoid falls or accidents. Avoid storing things in high places so you don't have to reach or climb.     Wear sturdy shoes that fit well – Wearing shoes with high heels or slippery soles, or shoes that are too loose, can lead to falls.  Take vitamin D pills which might lower the risk of falls in older people. This is because vitamin D helps make bones and muscles stronger.  Use a cane, walker, and other safety devices as these devices help you avoid falling, too. These include grab bars or a sturdy seat for the shower, non-slip bath mats, and hand rails or treads for the stairs (to prevent slipping). If you worry that you could fall, there are also alarm buttons that let you call for help if you fall and can't get up.   It is important to tell your doctor about any times you have fallen or almost fallen.  Seek immediate help for pain or injury after a fall.      SECONDARY DISCHARGE DIAGNOSES  Diagnosis: Influenza A  Assessment and Plan of Treatment: Influenza (flu) is an infection in the lungs and breathing passages. It is caused by the influenza virus. There are different strains, or types, of the flu virus from year to year. Unlike the common cold, the flu comes on suddenly and the symptoms can be more severe. These symptoms include a cough, congestion, fever, chills, fatigue, aches, and pains. These symptoms may last for a few weeks. Although the flu can make you feel very sick, it usually doesn't cause serious health problems.  Get plenty of rest.  Drink plenty of fluids. If you have to limit fluids because of a health problem, talk with your doctor before you increase the amount of fluids you drink.  Take an over-the-counter pain medicine if needed, such as acetaminophen (Tylenol), ibuprofen (Advil, Motrin), or naproxen (Aleve), to relieve fever, headache, and muscle aches. Read and follow all instructions on the label.  No one younger than 18 should take aspirin. It has been linked to Reye syndrome, a serious illness.  Take any prescribed medicine exactly as directed.  Do not If you are on medication to help you quit smoking, be sure to take it as prescribed. Find healthy ways to deal with stress, such as exercise (check with your healthcare provider first), deep breathing, meditation, or enjoyable healthy hobbies.    To avoid spreading the flu:  Wash your hands regularly, and keep your hands away from your face.  Stay home from school, work, and other public places until you are feeling better and your fever has been gone for at least 24 hours. The fever needs to have gone away on its own without the help of medicine.  Ask people living with you to talk to their doctors about preventing the flu. They may get antiviral medicine to keep from getting the flu from you.  To prevent the flu in the future, get the flu vaccine every fall. Encourage people living with you to get the vaccine.  Cover your mouth when you cough or sneeze. If you can, cough or sneeze into the bend of your elbow, not your hands.     PRINCIPAL DISCHARGE DIAGNOSIS  Diagnosis: Fall in home  Assessment and Plan of Treatment: Causes of fall may be due to illness, change in the medicines you take, unsafe or unfamiliar setting and conditions that affect eyesight, hearing, muscle strength, or balance.                                                            Certain medicines used for sleep, anxiety, or depression can cause falls. Adding new medicines, or changing doses of some medicines, can also affect your risk of falling. Your doctor might switch you to a different medicine.  Prevent falls by make your home safer – To avoid falling at home, get rid of things that might make you trip or slip. This might include furniture, electrical cords, clutter, and loose rugs. Keep your home well lit to avoid falls or accidents. Avoid storing things in high places so you don't have to reach or climb.     Wear sturdy shoes that fit well – Wearing shoes with high heels or slippery soles, or shoes that are too loose, can lead to falls.  Take vitamin D pills which might lower the risk of falls in older people. This is because vitamin D helps make bones and muscles stronger.  Use a cane, walker, and other safety devices as these devices help you avoid falling, too. These include grab bars or a sturdy seat for the shower, non-slip bath mats, and hand rails or treads for the stairs (to prevent slipping). If you worry that you could fall, there are also alarm buttons that let you call for help if you fall and can't get up.   It is important to tell your doctor about any times you have fallen or almost fallen.  Seek immediate help for pain or injury after a fall.      SECONDARY DISCHARGE DIAGNOSES  Diagnosis: Urinary retention  Assessment and Plan of Treatment: continue with reed catheter  follow up with urology in 1 week    Diagnosis: Influenza A  Assessment and Plan of Treatment: Influenza (flu) is an infection in the lungs and breathing passages. It is caused by the influenza virus. There are different strains, or types, of the flu virus from year to year. Unlike the common cold, the flu comes on suddenly and the symptoms can be more severe. These symptoms include a cough, congestion, fever, chills, fatigue, aches, and pains. These symptoms may last for a few weeks. Although the flu can make you feel very sick, it usually doesn't cause serious health problems.  Get plenty of rest.  Drink plenty of fluids. If you have to limit fluids because of a health problem, talk with your doctor before you increase the amount of fluids you drink.  Take an over-the-counter pain medicine if needed, such as acetaminophen (Tylenol), ibuprofen (Advil, Motrin), or naproxen (Aleve), to relieve fever, headache, and muscle aches. Read and follow all instructions on the label.  No one younger than 18 should take aspirin. It has been linked to Reye syndrome, a serious illness.  Take any prescribed medicine exactly as directed.  Do not If you are on medication to help you quit smoking, be sure to take it as prescribed. Find healthy ways to deal with stress, such as exercise (check with your healthcare provider first), deep breathing, meditation, or enjoyable healthy hobbies.    To avoid spreading the flu:  Wash your hands regularly, and keep your hands away from your face.  Stay home from school, work, and other public places until you are feeling better and your fever has been gone for at least 24 hours. The fever needs to have gone away on its own without the help of medicine.  Ask people living with you to talk to their doctors about preventing the flu. They may get antiviral medicine to keep from getting the flu from you.  To prevent the flu in the future, get the flu vaccine every fall. Encourage people living with you to get the vaccine.  Cover your mouth when you cough or sneeze. If you can, cough or sneeze into the bend of your elbow, not your hands.

## 2024-02-06 NOTE — DISCHARGE NOTE NURSING/CASE MANAGEMENT/SOCIAL WORK - NSSCNAMETXT_GEN_ALL_CORE
[facility], home physical therapy [facility]

## 2024-02-06 NOTE — PROGRESS NOTE ADULT - SUBJECTIVE AND OBJECTIVE BOX
SUBJECTIVE / OVERNIGHT EVENTS:    patient seen and examined in ED  resting comfortably in bed  droplet prec in place    --------------------------------------------------------------------------------------------  LABS:                        15.1   7.32  )-----------( 153      ( 05 Feb 2024 06:30 )             43.9     02-05    137  |  103  |  19  ----------------------------<  102<H>  3.8   |  22  |  1.08    Ca    9.0      05 Feb 2024 06:30  Phos  3.1     02-05  Mg     2.0     02-05    TPro  6.3  /  Alb  3.8  /  TBili  0.7  /  DBili  x   /  AST  40  /  ALT  31  /  AlkPhos  54  02-05      CAPILLARY BLOOD GLUCOSE            Urinalysis Basic - ( 05 Feb 2024 06:30 )    Color: x / Appearance: x / SG: x / pH: x  Gluc: 102 mg/dL / Ketone: x  / Bili: x / Urobili: x   Blood: x / Protein: x / Nitrite: x   Leuk Esterase: x / RBC: x / WBC x   Sq Epi: x / Non Sq Epi: x / Bacteria: x        RADIOLOGY & ADDITIONAL TESTS:    Imaging Personally Reviewed:  [x] YES  [ ] NO    Consultant(s) Notes Reviewed:  [x] YES  [ ] NO    MEDICATIONS  (STANDING):  amLODIPine   Tablet 2.5 milliGRAM(s) Oral daily  aspirin enteric coated 81 milliGRAM(s) Oral daily  atorvastatin 40 milliGRAM(s) Oral at bedtime  levothyroxine 50 MICROGram(s) Oral daily  metoprolol tartrate 25 milliGRAM(s) Oral two times a day  oseltamivir 30 milliGRAM(s) Oral two times a day  oseltamivir      tamsulosin 0.4 milliGRAM(s) Oral at bedtime    MEDICATIONS  (PRN):  acetaminophen     Tablet .. 650 milliGRAM(s) Oral every 6 hours PRN Temp greater or equal to 38C (100.4F), Mild Pain (1 - 3)  albuterol    90 MICROgram(s) HFA Inhaler 2 Puff(s) Inhalation every 6 hours PRN Shortness of Breath and/or Wheezing  aluminum hydroxide/magnesium hydroxide/simethicone Suspension 30 milliLiter(s) Oral every 4 hours PRN Dyspepsia  melatonin 3 milliGRAM(s) Oral at bedtime PRN Insomnia  ondansetron Injectable 4 milliGRAM(s) IV Push every 8 hours PRN Nausea and/or Vomiting      Care Discussed with Consultants/Other Providers [x] YES  [ ] NO    Vital Signs Last 24 Hrs  T(C): 36.8 (06 Feb 2024 09:41), Max: 37.1 (05 Feb 2024 19:25)  T(F): 98.2 (06 Feb 2024 09:41), Max: 98.7 (05 Feb 2024 19:25)  HR: 75 (06 Feb 2024 09:41) (59 - 75)  BP: 135/87 (06 Feb 2024 09:41) (113/72 - 150/82)  BP(mean): --  RR: 17 (06 Feb 2024 09:41) (17 - 20)  SpO2: 95% (06 Feb 2024 09:41) (94% - 98%)    Parameters below as of 06 Feb 2024 09:41  Patient On (Oxygen Delivery Method): room air      I&O's Summary    05 Feb 2024 07:01  -  06 Feb 2024 07:00  --------------------------------------------------------  IN: 0 mL / OUT: 150 mL / NET: -150 mL    PHYSICAL EXAM:  GENERAL: NAD, well-developed, comfortable  HEAD:  Atraumatic, Normocephalic  EYES: EOMI, PERRLA, conjunctiva and sclera clear  NECK: Supple, No JVD  CHEST/LUNG: Diminished bilaterally; slight wheeze  HEART: Regular rate and rhythm; No murmurs, rubs, or gallops  ABDOMEN: Soft, Nontender, Nondistended; Bowel sounds present  NEURO: AAOx3, no focal weakness, 5/5 b/l extremity strength, b/l knee no arthritis, no effusion   EXTREMITIES:  2+ Peripheral Pulses, No clubbing, cyanosis, or edema  SKIN: No rashes or lesions.

## 2024-02-07 VITALS
RESPIRATION RATE: 16 BRPM | TEMPERATURE: 98 F | SYSTOLIC BLOOD PRESSURE: 119 MMHG | OXYGEN SATURATION: 94 % | HEART RATE: 60 BPM | DIASTOLIC BLOOD PRESSURE: 73 MMHG

## 2024-02-07 PROCEDURE — 93005 ELECTROCARDIOGRAM TRACING: CPT

## 2024-02-07 PROCEDURE — 83605 ASSAY OF LACTIC ACID: CPT

## 2024-02-07 PROCEDURE — 84100 ASSAY OF PHOSPHORUS: CPT

## 2024-02-07 PROCEDURE — 85014 HEMATOCRIT: CPT

## 2024-02-07 PROCEDURE — 71045 X-RAY EXAM CHEST 1 VIEW: CPT

## 2024-02-07 PROCEDURE — 82435 ASSAY OF BLOOD CHLORIDE: CPT

## 2024-02-07 PROCEDURE — 82947 ASSAY GLUCOSE BLOOD QUANT: CPT

## 2024-02-07 PROCEDURE — 72170 X-RAY EXAM OF PELVIS: CPT

## 2024-02-07 PROCEDURE — 36415 COLL VENOUS BLD VENIPUNCTURE: CPT

## 2024-02-07 PROCEDURE — 84295 ASSAY OF SERUM SODIUM: CPT

## 2024-02-07 PROCEDURE — 82330 ASSAY OF CALCIUM: CPT

## 2024-02-07 PROCEDURE — 81001 URINALYSIS AUTO W/SCOPE: CPT

## 2024-02-07 PROCEDURE — 82803 BLOOD GASES ANY COMBINATION: CPT

## 2024-02-07 PROCEDURE — 97161 PT EVAL LOW COMPLEX 20 MIN: CPT

## 2024-02-07 PROCEDURE — 85025 COMPLETE CBC W/AUTO DIFF WBC: CPT

## 2024-02-07 PROCEDURE — 99285 EMERGENCY DEPT VISIT HI MDM: CPT | Mod: 25

## 2024-02-07 PROCEDURE — 83735 ASSAY OF MAGNESIUM: CPT

## 2024-02-07 PROCEDURE — 85018 HEMOGLOBIN: CPT

## 2024-02-07 PROCEDURE — 94640 AIRWAY INHALATION TREATMENT: CPT

## 2024-02-07 PROCEDURE — 93306 TTE W/DOPPLER COMPLETE: CPT

## 2024-02-07 PROCEDURE — 84132 ASSAY OF SERUM POTASSIUM: CPT

## 2024-02-07 PROCEDURE — 80053 COMPREHEN METABOLIC PANEL: CPT

## 2024-02-07 PROCEDURE — 87637 SARSCOV2&INF A&B&RSV AMP PRB: CPT

## 2024-02-07 RX ADMIN — Medication 81 MILLIGRAM(S): at 12:53

## 2024-02-07 RX ADMIN — Medication 30 MILLIGRAM(S): at 05:24

## 2024-02-07 RX ADMIN — AMLODIPINE BESYLATE 2.5 MILLIGRAM(S): 2.5 TABLET ORAL at 05:23

## 2024-02-07 RX ADMIN — Medication 50 MICROGRAM(S): at 05:24

## 2024-02-07 RX ADMIN — Medication 25 MILLIGRAM(S): at 05:24

## 2024-02-07 NOTE — PROGRESS NOTE ADULT - SUBJECTIVE AND OBJECTIVE BOX
DATE OF SERVICE: 02-07-24 @ 14:02    Patient is a 88y old  Male who presents with a chief complaint of Fall (06 Feb 2024 17:57)      INTERVAL HISTORY: No acute events, resting comfortably     REVIEW OF SYSTEMS:  CONSTITUTIONAL: No weakness  EYES/ENT: No visual changes;  No throat pain   NECK: No pain or stiffness  RESPIRATORY: No cough, wheezing; No shortness of breath  CARDIOVASCULAR: No chest pain or palpitations  GASTROINTESTINAL: No abdominal  pain. No nausea, vomiting, or hematemesis  GENITOURINARY: No dysuria, frequency or hematuria  NEUROLOGICAL: No stroke like symptoms  SKIN: No rashes      	  MEDICATIONS:  amLODIPine   Tablet 2.5 milliGRAM(s) Oral daily  metoprolol tartrate 25 milliGRAM(s) Oral two times a day        PHYSICAL EXAM:  T(C): 36.9 (02-07-24 @ 12:00), Max: 36.9 (02-07-24 @ 12:00)  HR: 60 (02-07-24 @ 12:00) (60 - 68)  BP: 119/73 (02-07-24 @ 12:00) (107/69 - 148/85)  RR: 16 (02-07-24 @ 12:00) (16 - 18)  SpO2: 94% (02-07-24 @ 12:00) (94% - 96%)  Wt(kg): --  I&O's Summary    06 Feb 2024 07:01  -  07 Feb 2024 07:00  --------------------------------------------------------  IN: 360 mL / OUT: 1600 mL / NET: -1240 mL          Appearance: In no distress	  HEENT:    PERRL, EOMI	  Cardiovascular:  S1 S2, No JVD  Respiratory: Lungs clear to auscultation	  Gastrointestinal:  Soft, Non-tender, + BS	  Vascularature:  No edema of LE  Psychiatric: Appropriate affect   Neuro: no acute focal deficits                     Labs personally reviewed      ASSESSMENT/PLAN: 	  89 y/o m w/ PMH of AS s/p TAVR, CAD s/p DEWAYNE and CABG, hypothyroidism, HTN, HLD, BPH.   Pt presented to ED on 2/3 with BLE weakness upon standing for several weeks, no h/o fall.     1. Fall  - Likely 2/2 Flu + with resultant hip and leg weakness   - Maintain fall prevention, bed check     2. CAD s/p PCI and CABG  -ECG ordered   - TTE unremarkable  - Follows with OP cardiologist Dr Wai Grajeda  -c/w Statin, ASA, BB    3. AS s/p TAVR  - TTE with well seated valve    4. HTN  -stable     5. HLD   check LDL  c/w Statin   LDL goal < 70     6. DVT prophylactic   -SCD's       OP FU with Dr Wai Mcclendon, AGNP-C  Edmond Maya DO [facility]  Cardiovascular Medicine  [address] [place] [address], [address]  Available via call or text on [employer]  Office: [phone]

## 2024-02-07 NOTE — PROGRESS NOTE ADULT - SUBJECTIVE AND OBJECTIVE BOX
SUBJECTIVE / OVERNIGHT EVENTS:    patient seen and examined  resting comfortably in bed  no c/o at this time  brianna remains in place    --------------------------------------------------------------------------------------------  LABS:            CAPILLARY BLOOD GLUCOSE                RADIOLOGY & ADDITIONAL TESTS:    Imaging Personally Reviewed:  [x] YES  [ ] NO    Consultant(s) Notes Reviewed:  [x] YES  [ ] NO    MEDICATIONS  (STANDING):  amLODIPine   Tablet 2.5 milliGRAM(s) Oral daily  aspirin enteric coated 81 milliGRAM(s) Oral daily  atorvastatin 40 milliGRAM(s) Oral at bedtime  levothyroxine 50 MICROGram(s) Oral daily  metoprolol tartrate 25 milliGRAM(s) Oral two times a day  oseltamivir 30 milliGRAM(s) Oral two times a day  oseltamivir      tamsulosin 0.4 milliGRAM(s) Oral at bedtime    MEDICATIONS  (PRN):  acetaminophen     Tablet .. 650 milliGRAM(s) Oral every 6 hours PRN Temp greater or equal to 38C (100.4F), Mild Pain (1 - 3)  albuterol    90 MICROgram(s) HFA Inhaler 2 Puff(s) Inhalation every 6 hours PRN Shortness of Breath and/or Wheezing  aluminum hydroxide/magnesium hydroxide/simethicone Suspension 30 milliLiter(s) Oral every 4 hours PRN Dyspepsia  melatonin 3 milliGRAM(s) Oral at bedtime PRN Insomnia  ondansetron Injectable 4 milliGRAM(s) IV Push every 8 hours PRN Nausea and/or Vomiting      Care Discussed with Consultants/Other Providers [x] YES  [ ] NO    Vital Signs Last 24 Hrs  T(C): 36.7 (07 Feb 2024 04:51), Max: 36.7 (06 Feb 2024 21:08)  T(F): 98.1 (07 Feb 2024 04:51), Max: 98.1 (06 Feb 2024 21:08)  HR: 60 (07 Feb 2024 04:51) (60 - 77)  BP: 142/83 (07 Feb 2024 04:51) (107/69 - 148/85)  BP(mean): --  RR: 18 (07 Feb 2024 04:51) (18 - 18)  SpO2: 95% (07 Feb 2024 04:51) (95% - 96%)    Parameters below as of 07 Feb 2024 04:51  Patient On (Oxygen Delivery Method): room air      I&O's Summary    06 Feb 2024 07:01  -  07 Feb 2024 07:00  --------------------------------------------------------  IN: 360 mL / OUT: 1600 mL / NET: -1240 mL    PHYSICAL EXAM:  GENERAL: NAD, well-developed, comfortable  HEAD:  Atraumatic, Normocephalic  EYES: EOMI, PERRLA, conjunctiva and sclera clear  NECK: Supple, No JVD  CHEST/LUNG: Diminished bilaterally; slight wheeze  HEART: Regular rate and rhythm; No murmurs, rubs, or gallops  ABDOMEN: Soft, Nontender, Nondistended; Bowel sounds present  NEURO: AAOx3, no focal weakness, 5/5 b/l extremity strength, b/l knee no arthritis, no effusion   EXTREMITIES:  2+ Peripheral Pulses, No clubbing, cyanosis, or edema  SKIN: No rashes or lesions.

## 2024-02-07 NOTE — PROGRESS NOTE ADULT - ASSESSMENT
87 y/o m w/ PMH of AS s/p TAVR, CAD s/p DEWAYNE and CABG, hypothyroidism, HTN, HLD, BPH.   Pt presented to ED on 2/3 with BLE weakness upon standing for several weeks  Pt returns on 2/4 to ED after he fell while trying to get out of the bed d/t leg weakness    Plan:    # S/p Fall/ Syncope:  - XR pelvis negative for fracture  - Fall precautions  - PT eval  - Check orthostatics  - Monitor on tele  - Cards eval pending    # Influenza A:  - No leukocytosis. H/H stable  - CXR w/ No focal consolidation. No pneumothorax or acute fracture  - Maintain FiO2>92%  - Maintain precautions  - Albuterol prn/ Nebs  - Tamiflu x 5 days  - ID following    # Urinary retention:  - TOV  - C/w Flomax    # HTN/ HLD/ CAD:  - Trend BP's  - C/w current meds    # Hypothyroidism:  - C/w Synthroid    # GI ppx:  - Bowel regimen prn    # DVT ppx:  - IPC's    [facility]  [phone]    
87 y/o m w/ PMH of AS s/p TAVR, CAD s/p DEWAYNE and CABG, hypothyroidism, HTN, HLD, BPH.   Pt presented to ED on 2/3 with BLE weakness upon standing for several weeks  Pt returns on 2/4 to ED after he fell while trying to get out of the bed d/t leg weakness    Plan:    # S/p Fall/ Syncope:  - XR pelvis negative for fracture  - Fall precautions  - PT eval -> [facility]; if home, home with home PT  - Fu orthos  - Echo results reviewed  - Monitor on tele  - Cards following    # Influenza A:  - CXR w/ No focal consolidation. No pneumothorax or acute fracture  - Maintain FiO2>92%  - Maintain precautions  - Albuterol prn/ Nebs  - Tamiflu x 5 days  - ID following    # Urinary retention:  - TOV  - C/w Flomax    # HTN/ HLD/ CAD:  - Trend BP's  - C/w current meds    # Hypothyroidism:  - C/w Synthroid    # GI ppx:  - Bowel regimen prn    # DVT ppx:  - IPC's    [facility]  [phone]    
87 y/o m w/ PMH of AS s/p TAVR, CAD s/p DEWAYNE and CABG, hypothyroidism, HTN, HLD, BPH.   Pt presented to ED on 2/3 with BLE weakness upon standing for several weeks  Pt returns on 2/4 to ED after he fell while trying to get out of the bed d/t leg weakness    Plan:    # S/p Fall/ Syncope:  - XR pelvis negative for fracture  - Fall precautions  - PT eval -> [facility]; if home, home with home PT (family declined [facility]ITA and opted for home w/ home PT)  - Echo with normal LV funciton, EF 65%, no WMA  - Monitor on tele  - Cards following    # Influenza A:  - CXR w/ No focal consolidation. No pneumothorax or acute fracture  - Maintain FiO2>92%  - Maintain precautions  - Albuterol prn/ Nebs  - Tamiflu x 5 days (to be completed 2/9)  - ID following    # Urinary retention:  - Bladder @ 22: 00-->700---reed reinserted 2/6  - C/w Flomax    # HTN/ HLD/ CAD:  - Trend BP's  - C/w current meds    # Hypothyroidism:  - C/w Synthroid    # GI ppx:  - Bowel regimen prn    # DVT ppx:  - IPC's    [facility]  [phone]    
Patient is a 88 year old male with PMH of AS s/p TAVR, CAD s/p DEWAYNE and CABG, hypothyroidism, HTN, HLD, BPH, former smoker who had presented on 2/3 with b/l lower extremity weakness upon standing for several weakness, noted with urinary retention and s/p reed placement who now presented with weakness and fall from bed and tested positive for influenza. Patient wife and sister also with influenza. He took his influenza vaccine in Oct 2023.     Influenza A  Weakness, s/p fall   Urinary retention s/p reed, BPH  - CXR with no focal consolidations  - 2/3 UA negative for pyuria   - Cardiology following, TTE report noted    - afebrile here, WBC wnl, feels better     Recommendations:  Continue Tamiflu to complete total 5d course on 2/9 am  No indication for antibiotics at this time   Supportive care  Stable from ID standpoint at this time.      Kavon Lima M.D.  [facility], Division of Infectious Diseases  [phone]  After 5pm on weekdays and all day on weekends - please call [phone]
Patient is a 88 year old male with PMH of AS s/p TAVR, CAD s/p DEWAYNE and CABG, hypothyroidism, HTN, HLD, BPH, former smoker who had presented on 2/3 with b/l lower extremity weakness upon standing for several weakness, noted with urinary retention and s/p reed placement who now presented with weakness and fall from bed and tested positive for influenza. Patient wife and sister also with influenza. He took his influenza vaccine in Oct 2023.     Influenza A  Weakness, s/p fall   Urinary retention s/p reed, BPH  - CXR with no focal consolidations  - 2/3 UA negative for pyuria   - Cardiology following, TTE report noted    - afebrile here, WBC wnl, feeling better     Recommendations:  Continue Tamiflu to complete total 5d course on 2/9 am  No indication for antibiotics at this time   Supportive care, supplemental O2 as needed  Reed care, TOV per primary team   Stable from ID standpoint at this time.      Kavon Lima M.D.  [facility], Division of Infectious Diseases  [phone]  After 5pm on weekdays and all day on weekends - please call [phone]

## 2024-02-07 NOTE — PROGRESS NOTE ADULT - PROVIDER SPECIALTY LIST ADULT
Cardiology
Infectious Disease
Infectious Disease
Internal Medicine
Cardiology
Internal Medicine
Internal Medicine

## 2024-02-07 NOTE — PROGRESS NOTE ADULT - SUBJECTIVE AND OBJECTIVE BOX
[facility] DIVISION OF INFECTIOUS DISEASES  RADHA Umanzor Y. Patel, S. Shah, G. Judd  [phone]  ([phone] - weekdays after 5pm and weekends)    Name: ALEXEI ROJAS  Age/Gender: 88y Male  MRN: [id number]    Interval History:  Patient seen and examined this morning.   No new complaints noted.  Notes reviewed  No concerning overnight events  Afebrile   Allergies: No Known Allergies      Objective:  Vitals:   T(F): 98.1 (02-07-24 @ 04:51), Max: 98.1 (02-06-24 @ 21:08)  HR: 60 (02-07-24 @ 04:51) (60 - 77)  BP: 142/83 (02-07-24 @ 04:51) (107/69 - 148/85)  RR: 18 (02-07-24 @ 04:51) (18 - 18)  SpO2: 95% (02-07-24 @ 04:51) (95% - 96%)  Physical Examination:  General: no acute distress, nontoxic, on RA  HEENT: NC/AT, anicteric, neck supple  Respiratory: no acc muscle use, breathing comfortably  Cardiovascular: S1 and S2 present  Gastrointestinal: normal appearing, nondistended  Extremities: no edema, no cyanosis  Skin: no visible rash    Laboratory Studies:  CBC:   WBC Trend:  7.32 02-05-24 @ 06:30  7.85 02-04-24 @ 19:54  10.33 02-03-24 @ 19:31    CMP:   Creatinine: 1.08 mg/dL (02-05-24 @ 06:30)  Creatinine: 1.18 mg/dL (02-04-24 @ 19:54)  Creatinine: 1.22 mg/dL (02-03-24 @ 19:31)    Microbiology: reviewed   Radiology: reviewed     Medications:  acetaminophen     Tablet .. 650 milliGRAM(s) Oral every 6 hours PRN  albuterol    90 MICROgram(s) HFA Inhaler 2 Puff(s) Inhalation every 6 hours PRN  aluminum hydroxide/magnesium hydroxide/simethicone Suspension 30 milliLiter(s) Oral every 4 hours PRN  amLODIPine   Tablet 2.5 milliGRAM(s) Oral daily  aspirin enteric coated 81 milliGRAM(s) Oral daily  atorvastatin 40 milliGRAM(s) Oral at bedtime  levothyroxine 50 MICROGram(s) Oral daily  melatonin 3 milliGRAM(s) Oral at bedtime PRN  metoprolol tartrate 25 milliGRAM(s) Oral two times a day  ondansetron Injectable 4 milliGRAM(s) IV Push every 8 hours PRN  oseltamivir      oseltamivir 30 milliGRAM(s) Oral two times a day  tamsulosin 0.4 milliGRAM(s) Oral at bedtime    Current Antimicrobials:  oseltamivir 30 milliGRAM(s) Oral two times a day  oseltamivir        Prior/Completed Antimicrobials:  oseltamivir

## 2024-02-29 ENCOUNTER — APPOINTMENT (OUTPATIENT)
Dept: UROLOGY | Facility: CLINIC | Age: 89
End: 2024-02-29

## 2024-08-01 ENCOUNTER — OFFICE (OUTPATIENT)
Dept: URBAN - METROPOLITAN AREA CLINIC 90 | Facility: CLINIC | Age: 89
Setting detail: OPHTHALMOLOGY
End: 2024-08-01
Payer: MEDICARE

## 2024-08-01 DIAGNOSIS — H16.223: ICD-10-CM

## 2024-08-01 DIAGNOSIS — H25.13: ICD-10-CM

## 2024-08-01 DIAGNOSIS — D31.31: ICD-10-CM

## 2024-08-01 DIAGNOSIS — H18.523: ICD-10-CM

## 2024-08-01 DIAGNOSIS — H53.2: ICD-10-CM

## 2024-08-01 DIAGNOSIS — H02.834: ICD-10-CM

## 2024-08-01 DIAGNOSIS — H02.831: ICD-10-CM

## 2024-08-01 DIAGNOSIS — H02.403: ICD-10-CM

## 2024-08-01 DIAGNOSIS — H43.812: ICD-10-CM

## 2024-08-01 PROBLEM — H52.7 REFRACTIVE ERROR: Status: ACTIVE | Noted: 2024-08-01

## 2024-08-01 PROCEDURE — 92012 INTRM OPH EXAM EST PATIENT: CPT | Performed by: OPHTHALMOLOGY

## 2024-08-01 ASSESSMENT — LID POSITION - COMMENTS
OD_COMMENTS: FLOPPY
OS_COMMENTS: FLOPPY

## 2024-08-01 ASSESSMENT — CONFRONTATIONAL VISUAL FIELD TEST (CVF)
OD_FINDINGS: FULL
OS_FINDINGS: FULL

## 2024-08-01 ASSESSMENT — LID POSITION - DERMATOCHALASIS
OS_DERMATOCHALASIS: LUL
OD_DERMATOCHALASIS: RUL

## 2024-08-01 ASSESSMENT — LID POSITION - PTOSIS
OD_PTOSIS: RUL T
OS_PTOSIS: LUL T

## 2024-09-22 ENCOUNTER — INPATIENT (INPATIENT)
Facility: HOSPITAL | Age: 89
LOS: 7 days | Discharge: NURSING, REHAB OR LONG-TERM CARE | DRG: 312 | End: 2024-09-30
Attending: STUDENT IN AN ORGANIZED HEALTH CARE EDUCATION/TRAINING PROGRAM | Admitting: STUDENT IN AN ORGANIZED HEALTH CARE EDUCATION/TRAINING PROGRAM
Payer: MEDICARE

## 2024-09-22 VITALS
DIASTOLIC BLOOD PRESSURE: 86 MMHG | OXYGEN SATURATION: 95 % | WEIGHT: 195.11 LBS | SYSTOLIC BLOOD PRESSURE: 142 MMHG | HEART RATE: 93 BPM | TEMPERATURE: 99 F | HEIGHT: 70 IN | RESPIRATION RATE: 19 BRPM

## 2024-09-22 DIAGNOSIS — Z92.89 PERSONAL HISTORY OF OTHER MEDICAL TREATMENT: Chronic | ICD-10-CM

## 2024-09-22 DIAGNOSIS — Z96.653 PRESENCE OF ARTIFICIAL KNEE JOINT, BILATERAL: Chronic | ICD-10-CM

## 2024-09-22 DIAGNOSIS — R55 SYNCOPE AND COLLAPSE: ICD-10-CM

## 2024-09-22 DIAGNOSIS — Z95.1 PRESENCE OF AORTOCORONARY BYPASS GRAFT: Chronic | ICD-10-CM

## 2024-09-22 LAB
ALBUMIN SERPL ELPH-MCNC: 4.7 G/DL — SIGNIFICANT CHANGE UP (ref 3.3–5)
ALP SERPL-CCNC: 86 U/L — SIGNIFICANT CHANGE UP (ref 40–120)
ALT FLD-CCNC: 32 U/L — SIGNIFICANT CHANGE UP (ref 10–45)
ANION GAP SERPL CALC-SCNC: 14 MMOL/L — SIGNIFICANT CHANGE UP (ref 5–17)
APTT BLD: 29.3 SEC — SIGNIFICANT CHANGE UP (ref 24.5–35.6)
AST SERPL-CCNC: 37 U/L — SIGNIFICANT CHANGE UP (ref 10–40)
BASOPHILS # BLD AUTO: 0.04 K/UL — SIGNIFICANT CHANGE UP (ref 0–0.2)
BASOPHILS NFR BLD AUTO: 0.3 % — SIGNIFICANT CHANGE UP (ref 0–2)
BILIRUB SERPL-MCNC: 1.2 MG/DL — SIGNIFICANT CHANGE UP (ref 0.2–1.2)
BLD GP AB SCN SERPL QL: NEGATIVE — SIGNIFICANT CHANGE UP
BUN SERPL-MCNC: 20 MG/DL — SIGNIFICANT CHANGE UP (ref 7–23)
CALCIUM SERPL-MCNC: 10.3 MG/DL — SIGNIFICANT CHANGE UP (ref 8.4–10.5)
CHLORIDE SERPL-SCNC: 103 MMOL/L — SIGNIFICANT CHANGE UP (ref 96–108)
CO2 SERPL-SCNC: 24 MMOL/L — SIGNIFICANT CHANGE UP (ref 22–31)
CREAT SERPL-MCNC: 1.1 MG/DL — SIGNIFICANT CHANGE UP (ref 0.5–1.3)
EGFR: 65 ML/MIN/1.73M2 — SIGNIFICANT CHANGE UP
EOSINOPHIL # BLD AUTO: 0.07 K/UL — SIGNIFICANT CHANGE UP (ref 0–0.5)
EOSINOPHIL NFR BLD AUTO: 0.6 % — SIGNIFICANT CHANGE UP (ref 0–6)
GLUCOSE SERPL-MCNC: 113 MG/DL — HIGH (ref 70–99)
HCT VFR BLD CALC: 47.2 % — SIGNIFICANT CHANGE UP (ref 39–50)
HGB BLD-MCNC: 16.7 G/DL — SIGNIFICANT CHANGE UP (ref 13–17)
IMM GRANULOCYTES NFR BLD AUTO: 0.3 % — SIGNIFICANT CHANGE UP (ref 0–0.9)
INR BLD: 1 RATIO — SIGNIFICANT CHANGE UP (ref 0.85–1.18)
LYMPHOCYTES # BLD AUTO: 1.55 K/UL — SIGNIFICANT CHANGE UP (ref 1–3.3)
LYMPHOCYTES # BLD AUTO: 12.7 % — LOW (ref 13–44)
MCHC RBC-ENTMCNC: 30.2 PG — SIGNIFICANT CHANGE UP (ref 27–34)
MCHC RBC-ENTMCNC: 35.4 GM/DL — SIGNIFICANT CHANGE UP (ref 32–36)
MCV RBC AUTO: 85.4 FL — SIGNIFICANT CHANGE UP (ref 80–100)
MONOCYTES # BLD AUTO: 1.06 K/UL — HIGH (ref 0–0.9)
MONOCYTES NFR BLD AUTO: 8.7 % — SIGNIFICANT CHANGE UP (ref 2–14)
NEUTROPHILS # BLD AUTO: 9.47 K/UL — HIGH (ref 1.8–7.4)
NEUTROPHILS NFR BLD AUTO: 77.4 % — HIGH (ref 43–77)
NRBC # BLD: 0 /100 WBCS — SIGNIFICANT CHANGE UP (ref 0–0)
PLATELET # BLD AUTO: 261 K/UL — SIGNIFICANT CHANGE UP (ref 150–400)
POTASSIUM SERPL-MCNC: 4 MMOL/L — SIGNIFICANT CHANGE UP (ref 3.5–5.3)
POTASSIUM SERPL-SCNC: 4 MMOL/L — SIGNIFICANT CHANGE UP (ref 3.5–5.3)
PROT SERPL-MCNC: 8.2 G/DL — SIGNIFICANT CHANGE UP (ref 6–8.3)
PROTHROM AB SERPL-ACNC: 10.5 SEC — SIGNIFICANT CHANGE UP (ref 9.5–13)
RBC # BLD: 5.53 M/UL — SIGNIFICANT CHANGE UP (ref 4.2–5.8)
RBC # FLD: 14.7 % — HIGH (ref 10.3–14.5)
RH IG SCN BLD-IMP: POSITIVE — SIGNIFICANT CHANGE UP
SODIUM SERPL-SCNC: 141 MMOL/L — SIGNIFICANT CHANGE UP (ref 135–145)
TROPONIN T, HIGH SENSITIVITY RESULT: 31 NG/L — SIGNIFICANT CHANGE UP (ref 0–51)
TROPONIN T, HIGH SENSITIVITY RESULT: 37 NG/L — SIGNIFICANT CHANGE UP (ref 0–51)
WBC # BLD: 12.23 K/UL — HIGH (ref 3.8–10.5)
WBC # FLD AUTO: 12.23 K/UL — HIGH (ref 3.8–10.5)

## 2024-09-22 PROCEDURE — 71045 X-RAY EXAM CHEST 1 VIEW: CPT | Mod: 26

## 2024-09-22 PROCEDURE — 73000 X-RAY EXAM OF COLLAR BONE: CPT | Mod: 26,RT

## 2024-09-22 PROCEDURE — 73030 X-RAY EXAM OF SHOULDER: CPT | Mod: 26,RT

## 2024-09-22 PROCEDURE — 99223 1ST HOSP IP/OBS HIGH 75: CPT

## 2024-09-22 PROCEDURE — 99285 EMERGENCY DEPT VISIT HI MDM: CPT | Mod: GC

## 2024-09-22 PROCEDURE — 72125 CT NECK SPINE W/O DYE: CPT | Mod: 26,MC

## 2024-09-22 PROCEDURE — 73130 X-RAY EXAM OF HAND: CPT | Mod: 26,RT

## 2024-09-22 PROCEDURE — 70450 CT HEAD/BRAIN W/O DYE: CPT | Mod: 26,MC

## 2024-09-22 PROCEDURE — 76377 3D RENDER W/INTRP POSTPROCES: CPT | Mod: 26

## 2024-09-22 PROCEDURE — 70486 CT MAXILLOFACIAL W/O DYE: CPT | Mod: 26,MC

## 2024-09-22 RX ORDER — ACETAMINOPHEN 325 MG
650 TABLET ORAL EVERY 6 HOURS
Refills: 0 | Status: DISCONTINUED | OUTPATIENT
Start: 2024-09-22 | End: 2024-09-30

## 2024-09-22 RX ORDER — LEVETIRACETAM 1000 MG
500 TABLET ORAL EVERY 12 HOURS
Refills: 0 | Status: DISCONTINUED | OUTPATIENT
Start: 2024-09-22 | End: 2024-09-29

## 2024-09-22 RX ORDER — MORPHINE SULFATE 30 MG/1
2 TABLET, FILM COATED, EXTENDED RELEASE ORAL ONCE
Refills: 0 | Status: DISCONTINUED | OUTPATIENT
Start: 2024-09-22 | End: 2024-09-22

## 2024-09-22 RX ORDER — ACETAMINOPHEN 325 MG
1000 TABLET ORAL ONCE
Refills: 0 | Status: COMPLETED | OUTPATIENT
Start: 2024-09-22 | End: 2024-09-22

## 2024-09-22 RX ADMIN — MORPHINE SULFATE 2 MILLIGRAM(S): 30 TABLET, FILM COATED, EXTENDED RELEASE ORAL at 18:50

## 2024-09-22 RX ADMIN — Medication 500 MILLIGRAM(S): at 19:27

## 2024-09-22 RX ADMIN — MORPHINE SULFATE 2 MILLIGRAM(S): 30 TABLET, FILM COATED, EXTENDED RELEASE ORAL at 22:11

## 2024-09-22 RX ADMIN — MORPHINE SULFATE 2 MILLIGRAM(S): 30 TABLET, FILM COATED, EXTENDED RELEASE ORAL at 22:26

## 2024-09-22 RX ADMIN — Medication 1000 MILLIGRAM(S): at 18:35

## 2024-09-22 RX ADMIN — Medication 400 MILLIGRAM(S): at 12:39

## 2024-09-22 RX ADMIN — MORPHINE SULFATE 2 MILLIGRAM(S): 30 TABLET, FILM COATED, EXTENDED RELEASE ORAL at 21:26

## 2024-09-22 NOTE — CONSULT NOTE ADULT - SUBJECTIVE AND OBJECTIVE BOX
Patient is a 89yMale community ambulator with assistive devices who presents to [facility] ED s/p syncopal episode. Patient states he woke up at the bottom of the stairs. Pos HS/LOC. Localizing pain to midline c spine, denies radiation of pain elsewhere. States ability to ambulate immediately following the injury. Denies pain down legs, denies numbness/tingling/paresthesias/weakness, denies incontinence of bowel/bladder.  Denies having any other pain elsewhere. Denies any previous orthopaedic history. No other orthopaedic concerns at this time.     PAST MEDICAL & SURGICAL HISTORY:  HTN (hypertension)      BPH (benign prostatic hyperplasia)      Hyperlipidemia      Hypothyroidism      Inguinal hernia, bilateral      Carpal tunnel syndrome of right wrist      Atrial fibrillation  post-surgery, resolved      CAD (coronary artery disease)      Stented coronary artery  2004      Low back pain      [place] (hard of hearing)  hearing aid bilateral      Aortic stenosis      S/P coronary angioplasty  2004 drug eluting      S/P CABG (coronary artery bypass graft)  07/1996 CABG x3      S/P tonsillectomy  at age 5 years old      S/P hernia repair  inquinal 2005      History of knee replacement, total, bilateral  right 2016  left 2011      History of carpal tunnel surgery  2014          Vital Signs Last 24 Hrs  T(C): 36.4 (22 Sep 2024 12:45), Max: 37.3 (22 Sep 2024 10:50)  T(F): 97.6 (22 Sep 2024 12:45), Max: 99.1 (22 Sep 2024 10:50)  HR: 83 (22 Sep 2024 14:09) (82 - 93)  BP: 161/99 (22 Sep 2024 14:09) (142/86 - 168/101)  BP(mean): 115 (22 Sep 2024 14:09) (115 - 119)  RR: 20 (22 Sep 2024 14:09) (19 - 22)  SpO2: 95% (22 Sep 2024 14:09) (95% - 95%)    Parameters below as of 22 Sep 2024 14:09  Patient On (Oxygen Delivery Method): room air      I&O's Detail      LABS:                        16.7   12.23 )-----------( 261      ( 22 Sep 2024 11:23 )             47.2     09-22    141  |  103  |  20  ----------------------------<  113[H]  4.0   |  24  |  1.10    Ca    10.3      22 Sep 2024 11:23    TPro  8.2  /  Alb  4.7  /  TBili  1.2  /  DBili  x   /  AST  37  /  ALT  32  /  AlkPhos  86  09-22    PT/INR - ( 22 Sep 2024 11:23 )   PT: 10.5 sec;   INR: 1.00 ratio         PTT - ( 22 Sep 2024 11:23 )  PTT:29.3 sec  Urinalysis Basic - ( 22 Sep 2024 11:23 )    Color: x / Appearance: x / SG: x / pH: x  Gluc: 113 mg/dL / Ketone: x  / Bili: x / Urobili: x   Blood: x / Protein: x / Nitrite: x   Leuk Esterase: x / RBC: x / WBC x   Sq Epi: x / Non Sq Epi: x / Bacteria: x        PHYSICAL EXAM:  General; Awake and alert, Oriented x 3  Spine: TTP over C spine. No TTP over spinous processes or paraspinal muscles at T/L spine. No palpable step off.         Motor exam:        C5       C6       C7       C8       T1   R  5/5      5/5     5/5     5/5      5/5  L   5/5      5/5     5/5     5/5      5/5         L2        L3       L4       L5      S1  R  5/5      5/5     5/5     5/5    5/5  L   5/5     5/5      5/5     5/5    5/5    Sensory:  (0=absent, 1=impaired, 2=normal, NT=not testable)      C5    C6   C7   C8   T1         R   2     2      2     2      2  L    2     2      2     2      2        L2    L3   L4   L5   S1   R   2     2     2     2     2  L    2     2     2     2     2    Right Upper extremity:   Negative Orellana  +Rad Pulse  Compartments soft and compressible    Left Upper extremity:   Negative Orellana  +Rad Pulse  Compartments soft and compressible    Right Lower Extremity:   SILT L2-S1  Negative Clonus  Negative Babinski  +DP  Compartments soft and compressible           Left Lower Extremity:  SILT L2-S1  Negative Clonus   Negative Babinski  +DP  Compartments soft and compressible    Secondary  Skin intact. No erythema/ecchymosis. No TTP over bony prominences, SILT, palpable pulses, full/painless A/PROM, compartments soft. No other injuries or complaints. Negative logroll/heelstrike BL.     Imaging:    CT CSp shows C4-5 spinous process fxs                                          A/P :   Patient is a 89yMale w/ C4-5 spinous process fxs    -Plan for non op management  -WBAT b/l lower ext PT/OT  -DVT ppx per primary  -Pain regimen prn  -Follow up outpt for further care  -Ortho spine signing off  -Plan discussed w attending, in agreement  -Plan discussed w pt, in agreement

## 2024-09-22 NOTE — H&P ADULT - NSICDXPASTMEDICALHX_GEN_ALL_CORE_FT
Griseofulvin Pregnancy And Lactation Text: This medication is Pregnancy Category X and is known to cause serious birth defects. It is unknown if this medication is excreted in breast milk but breast feeding should be avoided. Birth Control Pills Pregnancy And Lactation Text: This medication should be avoided if pregnant and for the first 30 days post-partum. Hydroxychloroquine Counseling:  I discussed with the patient that a baseline ophthalmologic exam is needed at the start of therapy and every year thereafter while on therapy. A CBC may also be warranted for monitoring.  The side effects of this medication were discussed with the patient, including but not limited to agranulocytosis, aplastic anemia, seizures, rashes, retinopathy, and liver toxicity. Patient instructed to call the office should any adverse effect occur.  The patient verbalized understanding of the proper use and possible adverse effects of Plaquenil.  All the patient's questions and concerns were addressed. Gabapentin Pregnancy And Lactation Text: This medication is Pregnancy Category C and isn't considered safe during pregnancy. It is excreted in breast milk. Minocycline Pregnancy And Lactation Text: This medication is Pregnancy Category D and not consider safe during pregnancy. It is also excreted in breast milk. Nsaids Counseling: NSAID Counseling: I discussed with the patient that NSAIDs should be taken with food. Prolonged use of NSAIDs can result in the development of stomach ulcers.  Patient advised to stop taking NSAIDs if abdominal pain occurs.  The patient verbalized understanding of the proper use and possible adverse effects of NSAIDs.  All of the patient's questions and concerns were addressed. Xolair Pregnancy And Lactation Text: This medication is Pregnancy Category B and is considered safe during pregnancy. This medication is excreted in breast milk. Humira Pregnancy And Lactation Text: This medication is Pregnancy Category B and is considered safe during pregnancy. It is unknown if this medication is excreted in breast milk. Glycopyrrolate Pregnancy And Lactation Text: This medication is Pregnancy Category B and is considered safe during pregnancy. It is unknown if it is excreted breast milk. Methotrexate Counseling:  Patient counseled regarding adverse effects of methotrexate including but not limited to nausea, vomiting, abnormalities in liver function tests. Patients may develop mouth sores, rash, diarrhea, and abnormalities in blood counts. The patient understands that monitoring is required including LFT's and blood counts.  There is a rare possibility of scarring of the liver and lung problems that can occur when taking methotrexate. Persistent nausea, loss of appetite, pale stools, dark urine, cough, and shortness of breath should be reported immediately. Patient advised to discontinue methotrexate treatment at least three months before attempting to become pregnant.  I discussed the need for folate supplements while taking methotrexate.  These supplements can decrease side effects during methotrexate treatment. The patient verbalized understanding of the proper use and possible adverse effects of methotrexate.  All of the patient's questions and concerns were addressed. Azathioprine Counseling:  I discussed with the patient the risks of azathioprine including but not limited to myelosuppression, immunosuppression, hepatotoxicity, lymphoma, and infections.  The patient understands that monitoring is required including baseline LFTs, Creatinine, possible TPMP genotyping and weekly CBCs for the first month and then every 2 weeks thereafter.  The patient verbalized understanding of the proper use and possible adverse effects of azathioprine.  All of the patient's questions and concerns were addressed. PAST MEDICAL HISTORY:  Aortic stenosis     Atrial fibrillation post-surgery, resolved    BPH (benign prostatic hyperplasia)     CAD (coronary artery disease)     Carpal tunnel syndrome of right wrist     [place] (hard of hearing) hearing aid bilateral    HTN (hypertension)     Hyperlipidemia     Hypothyroidism     Inguinal hernia, bilateral     Low back pain     Stented coronary artery 2004     Tremfya Pregnancy And Lactation Text: The risk during pregnancy and breastfeeding is uncertain with this medication. Doxepin Counseling:  Patient advised that the medication is sedating and not to drive a car after taking this medication. Patient informed of potential adverse effects including but not limited to dry mouth, urinary retention, and blurry vision.  The patient verbalized understanding of the proper use and possible adverse effects of doxepin.  All of the patient's questions and concerns were addressed. Cimzia Pregnancy And Lactation Text: This medication crosses the placenta but can be considered safe in certain situations. Cimzia may be excreted in breast milk. Cellcept Pregnancy And Lactation Text: This medication is Pregnancy Category D and isn't considered safe during pregnancy. It is unknown if this medication is excreted in breast milk. Itraconazole Pregnancy And Lactation Text: This medication is Pregnancy Category C and it isn't know if it is safe during pregnancy. It is also excreted in breast milk. SSKI Counseling:  I discussed with the patient the risks of SSKI including but not limited to thyroid abnormalities, metallic taste, GI upset, fever, headache, acne, arthralgias, paraesthesias, lymphadenopathy, easy bleeding, arrhythmias, and allergic reaction. Colchicine Counseling:  Patient counseled regarding adverse effects including but not limited to stomach upset (nausea, vomiting, stomach pain, or diarrhea).  Patient instructed to limit alcohol consumption while taking this medication.  Colchicine may reduce blood counts especially with prolonged use.  The patient understands that monitoring of kidney function and blood counts may be required, especially at baseline. The patient verbalized understanding of the proper use and possible adverse effects of colchicine.  All of the patient's questions and concerns were addressed. Acitretin Counseling:  I discussed with the patient the risks of acitretin including but not limited to hair loss, dry lips/skin/eyes, liver damage, hyperlipidemia, depression/suicidal ideation, photosensitivity.  Serious rare side effects can include but are not limited to pancreatitis, pseudotumor cerebri, bony changes, clot formation/stroke/heart attack.  Patient understands that alcohol is contraindicated since it can result in liver toxicity and significantly prolong the elimination of the drug by many years. Elidel Pregnancy And Lactation Text: This medication is Pregnancy Category C. It is unknown if this medication is excreted in breast milk. Carac Counseling:  I discussed with the patient the risks of Carac including but not limited to erythema, scaling, itching, weeping, crusting, and pain. Tazorac Counseling:  Patient advised that medication is irritating and drying.  Patient may need to apply sparingly and wash off after an hour before eventually leaving it on overnight.  The patient verbalized understanding of the proper use and possible adverse effects of tazorac.  All of the patient's questions and concerns were addressed. Cimzia Counseling:  I discussed with the patient the risks of Cimzia including but not limited to immunosuppression, allergic reactions and infections.  The patient understands that monitoring is required including a PPD at baseline and must alert us or the primary physician if symptoms of infection or other concerning signs are noted. Albendazole Pregnancy And Lactation Text: This medication is Pregnancy Category C and it isn't known if it is safe during pregnancy. It is also excreted in breast milk. Rituxan Counseling:  I discussed with the patient the risks of Rituxan infusions. Side effects can include infusion reactions, severe drug rashes including mucocutaneous reactions, reactivation of latent hepatitis and other infections and rarely progressive multifocal leukoencephalopathy.  All of the patient's questions and concerns were addressed. Minocycline Counseling: Patient advised regarding possible photosensitivity and discoloration of the teeth, skin, lips, tongue and gums.  Patient instructed to avoid sunlight, if possible.  When exposed to sunlight, patients should wear protective clothing, sunglasses, and sunscreen.  The patient was instructed to call the office immediately if the following severe adverse effects occur:  hearing changes, easy bruising/bleeding, severe headache, or vision changes.  The patient verbalized understanding of the proper use and possible adverse effects of minocycline.  All of the patient's questions and concerns were addressed. Doxepin Pregnancy And Lactation Text: This medication is Pregnancy Category C and it isn't known if it is safe during pregnancy. It is also excreted in breast milk and breast feeding isn't recommended. Minoxidil Counseling: Minoxidil is a topical medication which can increase blood flow where it is applied. It is uncertain how this medication increases hair growth. Side effects are uncommon and include stinging and allergic reactions. Topical Retinoid counseling:  Patient advised to apply a pea-sized amount only at bedtime and wait 30 minutes after washing their face before applying.  If too drying, patient may add a non-comedogenic moisturizer. The patient verbalized understanding of the proper use and possible adverse effects of retinoids.  All of the patient's questions and concerns were addressed. Dupixent Counseling: I discussed with the patient the risks of dupilumab including but not limited to eye infection and irritation, cold sores, injection site reactions, worsening of asthma, allergic reactions and increased risk of parasitic infection.  Live vaccines should be avoided while taking dupilumab. Dupilumab will also interact with certain medications such as warfarin and cyclosporine. The patient understands that monitoring is required and they must alert us or the primary physician if symptoms of infection or other concerning signs are noted. Albendazole Counseling:  I discussed with the patient the risks of albendazole including but not limited to cytopenia, kidney damage, nausea/vomiting and severe allergy.  The patient understands that this medication is being used in an off-label manner. Terbinafine Pregnancy And Lactation Text: This medication is Pregnancy Category B and is considered safe during pregnancy. It is also excreted in breast milk and breast feeding isn't recommended. Erythromycin Counseling:  I discussed with the patient the risks of erythromycin including but not limited to GI upset, allergic reaction, drug rash, diarrhea, increase in liver enzymes, and yeast infections. Acitretin Pregnancy And Lactation Text: This medication is Pregnancy Category X and should not be given to women who are pregnant or may become pregnant in the future. This medication is excreted in breast milk. Cyclophosphamide Counseling:  I discussed with the patient the risks of cyclophosphamide including but not limited to hair loss, hormonal abnormalities, decreased fertility, abdominal pain, diarrhea, nausea and vomiting, bone marrow suppression and infection. The patient understands that monitoring is required while taking this medication. Cyclophosphamide Pregnancy And Lactation Text: This medication is Pregnancy Category D and it isn't considered safe during pregnancy. This medication is excreted in breast milk. Hydroquinone Pregnancy And Lactation Text: This medication has not been assigned a Pregnancy Risk Category but animal studies failed to show danger with the topical medication. It is unknown if the medication is excreted in breast milk. Solaraze Counseling:  I discussed with the patient the risks of Solaraze including but not limited to erythema, scaling, itching, weeping, crusting, and pain. Benzoyl Peroxide Counseling: Patient counseled that medicine may cause skin irritation and bleach clothing.  In the event of skin irritation, the patient was advised to reduce the amount of the drug applied or use it less frequently.   The patient verbalized understanding of the proper use and possible adverse effects of benzoyl peroxide.  All of the patient's questions and concerns were addressed. 5-Fu Pregnancy And Lactation Text: This medication is Pregnancy Category X and contraindicated in pregnancy and in women who may become pregnant. It is unknown if this medication is excreted in breast milk. Taltz Counseling: I discussed with the patient the risks of ixekizumab including but not limited to immunosuppression, serious infections, worsening of inflammatory bowel disease and drug reactions.  The patient understands that monitoring is required including a PPD at baseline and must alert us or the primary physician if symptoms of infection or other concerning signs are noted. Oxybutynin Counseling:  I discussed with the patient the risks of oxybutynin including but not limited to skin rash, drowsiness, dry mouth, difficulty urinating, and blurred vision. Hydroxyzine Pregnancy And Lactation Text: This medication is not safe during pregnancy and should not be taken. It is also excreted in breast milk and breast feeding isn't recommended. Fluconazole Counseling:  Patient counseled regarding adverse effects of fluconazole including but not limited to headache, diarrhea, nausea, upset stomach, liver function test abnormalities, taste disturbance, and stomach pain.  There is a rare possibility of liver failure that can occur when taking fluconazole.  The patient understands that monitoring of LFTs and kidney function test may be required, especially at baseline. The patient verbalized understanding of the proper use and possible adverse effects of fluconazole.  All of the patient's questions and concerns were addressed. Quinolones Counseling:  I discussed with the patient the risks of fluoroquinolones including but not limited to GI upset, allergic reaction, drug rash, diarrhea, dizziness, photosensitivity, yeast infections, liver function test abnormalities, tendonitis/tendon rupture. Drysol Counseling:  I discussed with the patient the risks of drysol/aluminum chloride including but not limited to skin rash, itching, irritation, burning. Protopic Pregnancy And Lactation Text: This medication is Pregnancy Category C. It is unknown if this medication is excreted in breast milk when applied topically. Topical Sulfur Applications Counseling: Topical Sulfur Counseling: Patient counseled that this medication may cause skin irritation or allergic reactions.  In the event of skin irritation, the patient was advised to reduce the amount of the drug applied or use it less frequently.   The patient verbalized understanding of the proper use and possible adverse effects of topical sulfur application.  All of the patient's questions and concerns were addressed. Erivedge Counseling- I discussed with the patient the risks of Erivedge including but not limited to nausea, vomiting, diarrhea, constipation, weight loss, changes in the sense of taste, decreased appetite, muscle spasms, and hair loss.  The patient verbalized understanding of the proper use and possible adverse effects of Erivedge.  All of the patient's questions and concerns were addressed. Xeljanz Counseling: I discussed with the patient the risks of Xeljanz therapy including increased risk of infection, liver issues, headache, diarrhea, or cold symptoms. Live vaccines should be avoided. They were instructed to call if they have any problems. Otezla Counseling: The side effects of Otezla were discussed with the patient, including but not limited to worsening or new depression, weight loss, diarrhea, nausea, upper respiratory tract infection, and headache. Patient instructed to call the office should any adverse effect occur.  The patient verbalized understanding of the proper use and possible adverse effects of Otezla.  All the patient's questions and concerns were addressed. Hydroxychloroquine Pregnancy And Lactation Text: This medication has been shown to cause fetal harm but it isn't assigned a Pregnancy Risk Category. There are small amounts excreted in breast milk. Bactrim Counseling:  I discussed with the patient the risks of sulfa antibiotics including but not limited to GI upset, allergic reaction, drug rash, diarrhea, dizziness, photosensitivity, and yeast infections.  Rarely, more serious reactions can occur including but not limited to aplastic anemia, agranulocytosis, methemoglobinemia, blood dyscrasias, liver or kidney failure, lung infiltrates or desquamative/blistering drug rashes. Stelara Counseling:  I discussed with the patient the risks of ustekinumab including but not limited to immunosuppression, malignancy, posterior leukoencephalopathy syndrome, and serious infections.  The patient understands that monitoring is required including a PPD at baseline and must alert us or the primary physician if symptoms of infection or other concerning signs are noted. Imiquimod Counseling:  I discussed with the patient the risks of imiquimod including but not limited to erythema, scaling, itching, weeping, crusting, and pain.  Patient understands that the inflammatory response to imiquimod is variable from person to person and was educated regarded proper titration schedule.  If flu-like symptoms develop, patient knows to discontinue the medication and contact us. Protopic Counseling: Patient may experience a mild burning sensation during topical application. Protopic is not approved in children less than 2 years of age. There have been case reports of hematologic and skin malignancies in patients using topical calcineurin inhibitors although causality is questionable. Humira Counseling:  I discussed with the patient the risks of adalimumab including but not limited to myelosuppression, immunosuppression, autoimmune hepatitis, demyelinating diseases, lymphoma, and serious infections.  The patient understands that monitoring is required including a PPD at baseline and must alert us or the primary physician if symptoms of infection or other concerning signs are noted. Infliximab Counseling:  I discussed with the patient the risks of infliximab including but not limited to myelosuppression, immunosuppression, autoimmune hepatitis, demyelinating diseases, lymphoma, and serious infections.  The patient understands that monitoring is required including a PPD at baseline and must alert us or the primary physician if symptoms of infection or other concerning signs are noted. Zyclara Counseling:  I discussed with the patient the risks of imiquimod including but not limited to erythema, scaling, itching, weeping, crusting, and pain.  Patient understands that the inflammatory response to imiquimod is variable from person to person and was educated regarded proper titration schedule.  If flu-like symptoms develop, patient knows to discontinue the medication and contact us. Metronidazole Counseling:  I discussed with the patient the risks of metronidazole including but not limited to seizures, nausea/vomiting, a metallic taste in the mouth, nausea/vomiting and severe allergy. Metronidazole Pregnancy And Lactation Text: This medication is Pregnancy Category B and considered safe during pregnancy.  It is also excreted in breast milk. Hydroquinone Counseling:  Patient advised that medication may result in skin irritation, lightening (hypopigmentation), dryness, and burning.  In the event of skin irritation, the patient was advised to reduce the amount of the drug applied or use it less frequently.  Rarely, spots that are treated with hydroquinone can become darker (pseudoochronosis).  Should this occur, patient instructed to stop medication and call the office. The patient verbalized understanding of the proper use and possible adverse effects of hydroquinone.  All of the patient's questions and concerns were addressed. Benzoyl Peroxide Pregnancy And Lactation Text: This medication is Pregnancy Category C. It is unknown if benzoyl peroxide is excreted in breast milk. Xelgeoz Pregnancy And Lactation Text: This medication is Pregnancy Category D and is not considered safe during pregnancy.  The risk during breast feeding is also uncertain. Isotretinoin Pregnancy And Lactation Text: This medication is Pregnancy Category X and is considered extremely dangerous during pregnancy. It is unknown if it is excreted in breast milk. Cellcept Counseling:  I discussed with the patient the risks of mycophenolate mofetil including but not limited to infection/immunosuppression, GI upset, hypokalemia, hypercholesterolemia, bone marrow suppression, lymphoproliferative disorders, malignancy, GI ulceration/bleed/perforation, colitis, interstitial lung disease, kidney failure, progressive multifocal leukoencephalopathy, and birth defects.  The patient understands that monitoring is required including a baseline creatinine and regular CBC testing. In addition, patient must alert us immediately if symptoms of infection or other concerning signs are noted. Erivedge Pregnancy And Lactation Text: This medication is Pregnancy Category X and is absolutely contraindicated during pregnancy. It is unknown if it is excreted in breast milk. Otezla Pregnancy And Lactation Text: This medication is Pregnancy Category C and it isn't known if it is safe during pregnancy. It is unknown if it is excreted in breast milk. Rifampin Counseling: I discussed with the patient the risks of rifampin including but not limited to liver damage, kidney damage, red-orange body fluids, nausea/vomiting and severe allergy. Drysol Pregnancy And Lactation Text: This medication is considered safe during pregnancy and breast feeding. High Dose Vitamin A Counseling: Side effects reviewed, pt to contact office should one occur. Valtrex Pregnancy And Lactation Text: this medication is Pregnancy Category B and is considered safe during pregnancy. This medication is not directly found in breast milk but it's metabolite acyclovir is present. Prednisone Counseling:  I discussed with the patient the risks of prolonged use of prednisone including but not limited to weight gain, insomnia, osteoporosis, mood changes, diabetes, susceptibility to infection, glaucoma and high blood pressure.  In cases where prednisone use is prolonged, patients should be monitored with blood pressure checks, serum glucose levels and an eye exam.  Additionally, the patient may need to be placed on GI prophylaxis, PCP prophylaxis, and calcium and vitamin D supplementation and/or a bisphosphonate.  The patient verbalized understanding of the proper use and the possible adverse effects of prednisone.  All of the patient's questions and concerns were addressed. Odomzo Counseling- I discussed with the patient the risks of Odomzo including but not limited to nausea, vomiting, diarrhea, constipation, weight loss, changes in the sense of taste, decreased appetite, muscle spasms, and hair loss.  The patient verbalized understanding of the proper use and possible adverse effects of Odomzo.  All of the patient's questions and concerns were addressed. Detail Level: Zone Picato Counseling:  I discussed with the patient the risks of Picato including but not limited to erythema, scaling, itching, weeping, crusting, and pain. Oxybutynin Pregnancy And Lactation Text: This medication is Pregnancy Category B and is considered safe during pregnancy. It is unknown if it is excreted in breast milk. Cephalexin Counseling: I counseled the patient regarding use of cephalexin as an antibiotic for prophylactic and/or therapeutic purposes. Cephalexin (commonly prescribed under brand name Keflex) is a cephalosporin antibiotic which is active against numerous classes of bacteria, including most skin bacteria. Side effects may include nausea, diarrhea, gastrointestinal upset, rash, hives, yeast infections, and in rare cases, hepatitis, kidney disease, seizures, fever, confusion, neurologic symptoms, and others. Patients with severe allergies to penicillin medications are cautioned that there is about a 10% incidence of cross-reactivity with cephalosporins. When possible, patients with penicillin allergies should use alternatives to cephalosporins for antibiotic therapy. Glycopyrrolate Counseling:  I discussed with the patient the risks of glycopyrrolate including but not limited to skin rash, drowsiness, dry mouth, difficulty urinating, and blurred vision. Clindamycin Pregnancy And Lactation Text: This medication can be used in pregnancy if certain situations. Clindamycin is also present in breast milk. Rituxan Pregnancy And Lactation Text: This medication is Pregnancy Category C and it isn't know if it is safe during pregnancy. It is unknown if this medication is excreted in breast milk but similar antibodies are known to be excreted. Spironolactone Pregnancy And Lactation Text: This medication can cause feminization of the male fetus and should be avoided during pregnancy. The active metabolite is also found in breast milk. Terbinafine Counseling: Patient counseling regarding adverse effects of terbinafine including but not limited to headache, diarrhea, rash, upset stomach, liver function test abnormalities, itching, taste/smell disturbance, nausea, abdominal pain, and flatulence.  There is a rare possibility of liver failure that can occur when taking terbinafine.  The patient understands that a baseline LFT and kidney function test may be required. The patient verbalized understanding of the proper use and possible adverse effects of terbinafine.  All of the patient's questions and concerns were addressed. Cimetidine Counseling:  I discussed with the patient the risks of Cimetidine including but not limited to gynecomastia, headache, diarrhea, nausea, drowsiness, arrhythmias, pancreatitis, skin rashes, psychosis, bone marrow suppression and kidney toxicity. Xolair Counseling:  Patient informed of potential adverse effects including but not limited to fever, muscle aches, rash and allergic reactions.  The patient verbalized understanding of the proper use and possible adverse effects of Xolair.  All of the patient's questions and concerns were addressed. Isotretinoin Counseling: Patient should get monthly blood tests, not donate blood, not drive at night if vision affected, not share medication, and not undergo elective surgery for 6 months after tx completed. Side effects reviewed, pt to contact office should one occur. Birth Control Pills Counseling: Birth Control Pill Counseling: I discussed with the patient the potential side effects of OCPs including but not limited to increased risk of stroke, heart attack, thrombophlebitis, deep venous thrombosis, hepatic adenomas, breast changes, GI upset, headaches, and depression.  The patient verbalized understanding of the proper use and possible adverse effects of OCPs. All of the patient's questions and concerns were addressed. Ivermectin Counseling:  Patient instructed to take medication on an empty stomach with a full glass of water.  Patient informed of potential adverse effects including but not limited to nausea, diarrhea, dizziness, itching, and swelling of the extremities or lymph nodes.  The patient verbalized understanding of the proper use and possible adverse effects of ivermectin.  All of the patient's questions and concerns were addressed. Ketoconazole Pregnancy And Lactation Text: This medication is Pregnancy Category C and it isn't know if it is safe during pregnancy. It is also excreted in breast milk and breast feeding isn't recommended. Sski Pregnancy And Lactation Text: This medication is Pregnancy Category D and isn't considered safe during pregnancy. It is excreted in breast milk. Use Enhanced Medication Counseling?: No Doxycycline Counseling:  Patient counseled regarding possible photosensitivity and increased risk for sunburn.  Patient instructed to avoid sunlight, if possible.  When exposed to sunlight, patients should wear protective clothing, sunglasses, and sunscreen.  The patient was instructed to call the office immediately if the following severe adverse effects occur:  hearing changes, easy bruising/bleeding, severe headache, or vision changes.  The patient verbalized understanding of the proper use and possible adverse effects of doxycycline.  All of the patient's questions and concerns were addressed. Hydroxyzine Counseling: Patient advised that the medication is sedating and not to drive a car after taking this medication.  Patient informed of potential adverse effects including but not limited to dry mouth, urinary retention, and blurry vision.  The patient verbalized understanding of the proper use and possible adverse effects of hydroxyzine.  All of the patient's questions and concerns were addressed. Rifampin Pregnancy And Lactation Text: This medication is Pregnancy Category C and it isn't know if it is safe during pregnancy. It is also excreted in breast milk and should not be used if you are breast feeding. Clofazimine Counseling:  I discussed with the patient the risks of clofazimine including but not limited to skin and eye pigmentation, liver damage, nausea/vomiting, gastrointestinal bleeding and allergy. Griseofulvin Counseling:  I discussed with the patient the risks of griseofulvin including but not limited to photosensitivity, cytopenia, liver damage, nausea/vomiting and severe allergy.  The patient understands that this medication is best absorbed when taken with a fatty meal (e.g., ice cream or french fries). Cyclosporine Counseling:  I discussed with the patient the risks of cyclosporine including but not limited to hypertension, gingival hyperplasia,myelosuppression, immunosuppression, liver damage, kidney damage, neurotoxicity, lymphoma, and serious infections. The patient understands that monitoring is required including baseline blood pressure, CBC, CMP, lipid panel and uric acid, and then 1-2 times monthly CMP and blood pressure. Solaraze Pregnancy And Lactation Text: This medication is Pregnancy Category B and is considered safe. There is some data to suggest avoiding during the third trimester. It is unknown if this medication is excreted in breast milk. Bexarotene Pregnancy And Lactation Text: This medication is Pregnancy Category X and should not be given to women who are pregnant or may become pregnant. This medication should not be used if you are breast feeding. Dapsone Pregnancy And Lactation Text: This medication is Pregnancy Category C and is not considered safe during pregnancy or breast feeding. Cyclosporine Pregnancy And Lactation Text: This medication is Pregnancy Category C and it isn't know if it is safe during pregnancy. This medication is excreted in breast milk. Erythromycin Pregnancy And Lactation Text: This medication is Pregnancy Category B and is considered safe during pregnancy. It is also excreted in breast milk. Dupixent Pregnancy And Lactation Text: This medication likely crosses the placenta but the risk for the fetus is uncertain. This medication is excreted in breast milk. Simponi Counseling:  I discussed with the patient the risks of golimumab including but not limited to myelosuppression, immunosuppression, autoimmune hepatitis, demyelinating diseases, lymphoma, and serious infections.  The patient understands that monitoring is required including a PPD at baseline and must alert us or the primary physician if symptoms of infection or other concerning signs are noted. Topical Clindamycin Counseling: Patient counseled that this medication may cause skin irritation or allergic reactions.  In the event of skin irritation, the patient was advised to reduce the amount of the drug applied or use it less frequently.   The patient verbalized understanding of the proper use and possible adverse effects of clindamycin.  All of the patient's questions and concerns were addressed. Tetracycline Counseling: Patient counseled regarding possible photosensitivity and increased risk for sunburn.  Patient instructed to avoid sunlight, if possible.  When exposed to sunlight, patients should wear protective clothing, sunglasses, and sunscreen.  The patient was instructed to call the office immediately if the following severe adverse effects occur:  hearing changes, easy bruising/bleeding, severe headache, or vision changes.  The patient verbalized understanding of the proper use and possible adverse effects of tetracycline.  All of the patient's questions and concerns were addressed. Patient understands to avoid pregnancy while on therapy due to potential birth defects. Thalidomide Counseling: I discussed with the patient the risks of thalidomide including but not limited to birth defects, anxiety, weakness, chest pain, dizziness, cough and severe allergy. Azithromycin Counseling:  I discussed with the patient the risks of azithromycin including but not limited to GI upset, allergic reaction, drug rash, diarrhea, and yeast infections. Ketoconazole Counseling:   Patient counseled regarding improving absorption with orange juice.  Adverse effects include but are not limited to breast enlargement, headache, diarrhea, nausea, upset stomach, liver function test abnormalities, taste disturbance, and stomach pain.  There is a rare possibility of liver failure that can occur when taking ketoconazole. The patient understands that monitoring of LFTs may be required, especially at baseline. The patient verbalized understanding of the proper use and possible adverse effects of ketoconazole.  All of the patient's questions and concerns were addressed. Valtrex Counseling: I discussed with the patient the risks of valacyclovir including but not limited to kidney damage, nausea, vomiting and severe allergy.  The patient understands that if the infection seems to be worsening or is not improving, they are to call. Cosentyx Counseling:  I discussed with the patient the risks of Cosentyx including but not limited to worsening of Crohn's disease, immunosuppression, allergic reactions and infections.  The patient understands that monitoring is required including a PPD at baseline and must alert us or the primary physician if symptoms of infection or other concerning signs are noted. High Dose Vitamin A Pregnancy And Lactation Text: High dose vitamin A therapy is contraindicated during pregnancy and breast feeding. 5-Fu Counseling: 5-Fluorouracil Counseling:  I discussed with the patient the risks of 5-fluorouracil including but not limited to erythema, scaling, itching, weeping, crusting, and pain. Bexarotene Counseling:  I discussed with the patient the risks of bexarotene including but not limited to hair loss, dry lips/skin/eyes, liver abnormalities, hyperlipidemia, pancreatitis, depression/suicidal ideation, photosensitivity, drug rash/allergic reactions, hypothyroidism, anemia, leukopenia, infection, cataracts, and teratogenicity.  Patient understands that they will need regular blood tests to check lipid profile, liver function tests, white blood cell count, thyroid function tests and pregnancy test if applicable. Itraconazole Counseling:  I discussed with the patient the risks of itraconazole including but not limited to liver damage, nausea/vomiting, neuropathy, and severe allergy.  The patient understands that this medication is best absorbed when taken with acidic beverages such as non-diet cola or ginger ale.  The patient understands that monitoring is required including baseline LFTs and repeat LFTs at intervals.  The patient understands that they are to contact us or the primary physician if concerning signs are noted. Enbrel Counseling:  I discussed with the patient the risks of etanercept including but not limited to myelosuppression, immunosuppression, autoimmune hepatitis, demyelinating diseases, lymphoma, and infections.  The patient understands that monitoring is required including a PPD at baseline and must alert us or the primary physician if symptoms of infection or other concerning signs are noted. Methotrexate Pregnancy And Lactation Text: This medication is Pregnancy Category X and is known to cause fetal harm. This medication is excreted in breast milk. Doxycycline Pregnancy And Lactation Text: This medication is Pregnancy Category D and not consider safe during pregnancy. It is also excreted in breast milk but is considered safe for shorter treatment courses. Elidel Counseling: Patient may experience a mild burning sensation during topical application. Elidel is not approved in children less than 2 years of age. There have been case reports of hematologic and skin malignancies in patients using topical calcineurin inhibitors although causality is questionable. Cephalexin Pregnancy And Lactation Text: This medication is Pregnancy Category B and considered safe during pregnancy.  It is also excreted in breast milk but can be used safely for shorter doses. Bactrim Pregnancy And Lactation Text: This medication is Pregnancy Category D and is known to cause fetal risk.  It is also excreted in breast milk. Tremfya Counseling: I discussed with the patient the risks of guselkumab including but not limited to immunosuppression, serious infections, worsening of inflammatory bowel disease and drug reactions.  The patient understands that monitoring is required including a PPD at baseline and must alert us or the primary physician if symptoms of infection or other concerning signs are noted. Topical Sulfur Applications Pregnancy And Lactation Text: This medication is Pregnancy Category C and has an unknown safety profile during pregnancy. It is unknown if this topical medication is excreted in breast milk. Arava Counseling:  Patient counseled regarding adverse effects of Arava including but not limited to nausea, vomiting, abnormalities in liver function tests. Patients may develop mouth sores, rash, diarrhea, and abnormalities in blood counts. The patient understands that monitoring is required including LFTs and blood counts.  There is a rare possibility of scarring of the liver and lung problems that can occur when taking methotrexate. Persistent nausea, loss of appetite, pale stools, dark urine, cough, and shortness of breath should be reported immediately. Patient advised to discontinue Arava treatment and consult with a physician prior to attempting conception. The patient will have to undergo a treatment to eliminate Arava from the body prior to conception. Eucrisa Counseling: Patient may experience a mild burning sensation during topical application. Eucrisa is not approved in children less than 2 years of age. Tazorac Pregnancy And Lactation Text: This medication is not safe during pregnancy. It is unknown if this medication is excreted in breast milk. Gabapentin Counseling: I discussed with the patient the risks of gabapentin including but not limited to dizziness, somnolence, fatigue and ataxia. Spironolactone Counseling: Patient advised regarding risks of diarrhea, abdominal pain, hyperkalemia, birth defects (for female patients), liver toxicity and renal toxicity. The patient may need blood work to monitor liver and kidney function and potassium levels while on therapy. The patient verbalized understanding of the proper use and possible adverse effects of spironolactone.  All of the patient's questions and concerns were addressed. Dapsone Counseling: I discussed with the patient the risks of dapsone including but not limited to hemolytic anemia, agranulocytosis, rashes, methemoglobinemia, kidney failure, peripheral neuropathy, headaches, GI upset, and liver toxicity.  Patients who start dapsone require monitoring including baseline LFTs and weekly CBCs for the first month, then every month thereafter.  The patient verbalized understanding of the proper use and possible adverse effects of dapsone.  All of the patient's questions and concerns were addressed. Siliq Counseling:  I discussed with the patient the risks of Siliq including but not limited to new or worsening depression, suicidal thoughts and behavior, immunosuppression, malignancy, posterior leukoencephalopathy syndrome, and serious infections.  The patient understands that monitoring is required including a PPD at baseline and must alert us or the primary physician if symptoms of infection or other concerning signs are noted. There is also a special program designed to monitor depression which is required with Siliq. Clindamycin Counseling: I counseled the patient regarding use of clindamycin as an antibiotic for prophylactic and/or therapeutic purposes. Clindamycin is active against numerous classes of bacteria, including skin bacteria. Side effects may include nausea, diarrhea, gastrointestinal upset, rash, hives, yeast infections, and in rare cases, colitis. Nsaids Pregnancy And Lactation Text: These medications are considered safe up to 30 weeks gestation. It is excreted in breast milk. Azithromycin Pregnancy And Lactation Text: This medication is considered safe during pregnancy and is also secreted in breast milk.

## 2024-09-22 NOTE — H&P ADULT - NSHPADDITIONALINFOADULT_GEN_ALL_CORE
Discussed with Dr. Thelma Aquino who requested for initial evaluation/H&P and will assume care of this patient in the morning of 9/23/24

## 2024-09-22 NOTE — CONSULT NOTE ADULT - ASSESSMENT
HPI: 89M on ASA81 for cardiac disease last took this AM, h/o HTN, HLD, CAD, aortic stenosis, CABG 1996, PCI w/DEWAYNE 2004 pres s/p cardiogenic syncope. Fell last night and hit face on concrete steps. ILP w/ 5 sec pause. 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 Plts 261m coags wnl.     -no acute neurosurgical intervention  -hold AC/AP at this time  -obtain repeat CTH for stability 3-4 hours following inital scan  -if short interval scan stable no c/i to medicine admission for cardiac w/u  -obtain long interval scan 12-24hrs after index CTH for stability  -keppra 500 bid HPI: 89M on ASA81 for cardiac disease last took this AM, h/o HTN, HLD, CAD, aortic stenosis, CABG 1996, PCI w/DEWAYNE 2004 pres s/p cardiogenic syncope. Fell last night and hit face on concrete steps. ILP w/ 5 sec pause. CTH w/ 6mm left frontal hyperdense heme.  Plts 261m coags wnl.     -no acute neurosurgical intervention  -hold AC/AP at this time  -obtain repeat CTH for stability 3-4 hours following inital scan  -if short interval scan stable no c/i to medicine admission for cardiac w/u  -obtain long interval scan 12-24hrs after index CTH for stability  -keppra 500 bid x7 days

## 2024-09-22 NOTE — H&P ADULT - PROBLEM SELECTOR PLAN 3
s/p syncopal fall with acute displaced c4 on c5 posterior spinous process fracture  - C-collar in place  - PT eval and WBAT b/l LE  - Ortho consult appreciated

## 2024-09-22 NOTE — H&P ADULT - ASSESSMENT
89 year-old male with HTN, HLD, CAD s/p CABG 1996, PCI w/DEWAYNE 2004, aortic stenosis, hypothyroid, BPH presents with a syncopal fall last night and hit face on concrete steps. ILP w/ 5 sec pause. 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 Plts 261m coags wnl. 89 year-old male with HTN, HLD, CAD s/p CABG 1996, PCI w/DEWAYNE 2004, aortic stenosis, hypothyroid, BPH presents with a syncopal fall last night and hit face on concrete steps, time correlating with 5 sec pause on loop recorder, CTH w/ 6mm left frontal hyperdense lesion w/o surrounding edema and C4-5 spinous process fracture admitted for likely PPM and monitoring of possible ICH

## 2024-09-22 NOTE — ED PROVIDER NOTE - NSICDXPASTMEDICALHX_GEN_ALL_CORE_FT
PAST MEDICAL HISTORY:  Aortic stenosis     Atrial fibrillation post-surgery, resolved    BPH (benign prostatic hyperplasia)     CAD (coronary artery disease)     Carpal tunnel syndrome of right wrist     [place] (hard of hearing) hearing aid bilateral    HTN (hypertension)     Hyperlipidemia     Hypothyroidism     Inguinal hernia, bilateral     Low back pain     Stented coronary artery 2004

## 2024-09-22 NOTE — H&P ADULT - NSHPPOAPULMEMBOLUS_GEN_A_CORE
Bill [id number] For Specimen Handling/Conveyance To Laboratory?: no Electrodesiccation Text: The wound bed was treated with electrodesiccation after the biopsy was performed. Biopsy Type: H and E Lab: [id number] Dressing: bandage Size Of Lesion In Cm: 0.8 no Biopsy Method: Dermablade Wound Care: Bacitracin Cryotherapy Text: The wound bed was treated with cryotherapy after the biopsy was performed. Notification Instructions: Patient will be notified of biopsy results. However, patient instructed to call the office if not contacted within 2 weeks. Render Post-Care Instructions In Note?: yes Electrodesiccation And Curettage Text: The wound bed was treated with electrodesiccation and curettage after the biopsy was performed. Billing Type: Third-Party Bill Consent: Written consent was obtained and risks were reviewed including but not limited to scarring, infection, bleeding, scabbing, incomplete removal, nerve damage and allergy to anesthesia. Detail Level: Detailed Anesthesia Type: 2% lidocaine with epinephrine Anesthesia Volume In Cc (Will Not Render If 0): 0.5 Type Of Destruction Used: Curettage Silver Nitrate Text: The wound bed was treated with silver nitrate after the biopsy was performed. Post-Care Instructions: I reviewed with the patient in detail post-care instructions. Patient is to keep the biopsy site dry overnight, and then apply bacitracin twice daily until healed. Patient may apply hydrogen peroxide soaks to remove any crusting. Lab Facility: [id number] Curettage Text: The wound bed was treated with curettage after the biopsy was performed. Hemostasis: Electrocautery Additional Anesthesia Volume In Cc (Will Not Render If 0): 0 X Size Of Lesion In Cm: 0.6 Billing Type: Third-Party Bill Lab: [id number] Size Of Lesion In Cm: 1 Lab Facility: [id number]

## 2024-09-22 NOTE — CONSULT NOTE ADULT - SUBJECTIVE AND OBJECTIVE BOX
p ([id number])     HPI: 89M on ASA81 for cardiac disease last took this AM, h/o HTN, HLD, CAD, aortic stenosis, CABG 1996, PCI w/DEWAYNE 2004 pres s/p cardiogenic syncope. Fell last night and hit face on concrete steps. ILP w/ 5 sec pause. 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 Plts 261m coags wnl.     Exam: Awake, hard of hearing, Ox3, PERRL, EOMI, no facial, no drift, TATE 5/5. Left periorbital ecchymosis.     =====================  PAST MEDICAL HISTORY   HTN (hypertension)    BPH (benign prostatic hyperplasia)    Hyperlipidemia    Hypothyroidism    Inguinal hernia, bilateral    Carpal tunnel syndrome of right wrist    Atrial fibrillation    CAD (coronary artery disease)    Stented coronary artery    Low back pain    [place] (hard of hearing)    Aortic stenosis      PAST SURGICAL HISTORY   S/P coronary angioplasty    S/P knee replacement, left    S/P CABG (coronary artery bypass graft)    S/P tonsillectomy    S/P hernia repair    History of knee replacement, total, bilateral    History of carpal tunnel surgery      No Known Allergies      MEDICATIONS:  Antibiotics:    Neuro:  morphine  - Injectable 2 milliGRAM(s) IV Push Once    Other:      SOCIAL HISTORY:   Occupation:   Marital Status:     FAMILY HISTORY:  Family history of hypertension    Family history of stroke    Family history of myocardial infarction        ROS: Negative except per HPI    LABS:  PT/INR - ( 22 Sep 2024 11:23 )   PT: 10.5 sec;   INR: 1.00 ratio         PTT - ( 22 Sep 2024 11:23 )  PTT:29.3 sec                        16.7   12.23 )-----------( 261      ( 22 Sep 2024 11:23 )             47.2     09-22    141  |  103  |  20  ----------------------------<  113[H]  4.0   |  24  |  1.10    Ca    10.3      22 Sep 2024 11:23    TPro  8.2  /  Alb  4.7  /  TBili  1.2  /  DBili  x   /  AST  37  /  ALT  32  /  AlkPhos  86  09-22

## 2024-09-22 NOTE — ED ADULT TRIAGE NOTE - IDEAL BODY WEIGHT(KG)
____________________PATIENT EDUCATION____________________    PATIENT EDUCATION:  Today I met with the patient to discuss the chemotherapy regimen recommended for treatment of his disease.  The patient was given explanation of treatment premed side effects including office policy that prohibits patients to drive if sedating medications are administered, MD explanation given regarding benefits, side effects, toxicities and goals of treatment.  The patient received a Chemotherapy/Biotherapy Plan Summary including diagnosis and specific treatment plan.    SIDE EFFECTS:  Common side effects were discussed with the patient.  Discussion included hair loss/discoloration, anemia/fatigue, infection/chills/fever, appetite, bleeding risk/precautions, constipation, diarrhea, mouth sores, taste alteration, loss of appetite,nausea/vomiting, peripheral neuropathy, skin/nail changes, rash, muscle aches/weakness, photosensitivity, weight gain/loss, hearing loss, dizziness, menopausal symptoms, menstrrual irregularity, sterility, high blood pressure, heart damage, liver damage, lung damage, kidney damage, DVT/PE risk, fluid retention, pleural/pericardial effusion, somnolence, electrolyte/LFT imbalance, vein exercises and/or the possible need for vascular access/port placement.  The patient was advice that although uncommon, leakage of an infused medication from the vein or venous access device (port) may lead to skin breakdown and/or other tissue damage.  The patient was advised that he/she may have pain, bleeding, and/or bruising from the insertion of a needle in their vein or venous access device (port).  The patient was further advised that, in spite of proper technique, infection with redness and irritation may rarely occur at the site where the needle was inserted.  The patient was advised that if complications occur, additional medical treatment is available.    Discussion also included side effects specific to drugs in the  treatment plan, specifically Adriamycin, Cytoxan, Taxol and Neulasta.    Reproductive risks were discussed, including appropriate use of birth control and protection during sexual relations.    Survivorship referral placed if appropriate YES    A discussion was had regarding the [employer].  The patient was given an educational folder on [employer] and briefly discussed the financial disclosures as well as the home care receommedations.      A total of 70 minutes were spent with the patient, with 100% of time spent in education and counseling.     73

## 2024-09-22 NOTE — H&P ADULT - PROBLEM SELECTOR PLAN 2
s/p syncopal fall with head trauma, short interval CTH without significant change  - will hold ASA  - will follow up 12-14 hours CTH for stability  - Q4 hours neuro check and Keppra 500mg bid for seizure prophylaxis  - Neurosurgery appreciated

## 2024-09-22 NOTE — ED PROVIDER NOTE - OBJECTIVE STATEMENT
24-Mar-2018 16:06 89 year old male with no pmhx of cad, bph, aortic stenosis, hypothyroid, on asa, here after passing out last night while waliking up the steps and hit face on steps. got up and went to bed. was called this am by cardiologist and noted to have 5 sec pause at the time of the syncope and fall and came to ER. no cp, no sob. no dizzines. c/o pain to left eye, right shoulder and neck,.

## 2024-09-22 NOTE — ED PROVIDER NOTE - WR ORDER STATUS 5
Canceled Tranexamic Acid Pregnancy And Lactation Text: It is unknown if this medication is safe during pregnancy or breast feeding.

## 2024-09-22 NOTE — ED PROVIDER NOTE - PROGRESS NOTE DETAILS
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 neurosurgery consulted, recommend repeat CT now. Patient also has C4-C5 spinous process fracture, pending callback from spine to discuss.

## 2024-09-22 NOTE — ED ADULT NURSE NOTE - NSICDXPASTMEDICALHX_GEN_ALL_CORE_FT
PAST MEDICAL HISTORY:  Aortic stenosis     Atrial fibrillation post-surgery, resolved    BPH (benign prostatic hyperplasia)     CAD (coronary artery disease)     Carpal tunnel syndrome of right wrist     [place] (hard of hearing) hearing aid bilateral    HTN (hypertension)     Hyperlipidemia     Hypothyroidism     Inguinal hernia, bilateral     Low back pain     Stented coronary artery 2004

## 2024-09-22 NOTE — H&P ADULT - HISTORY OF PRESENT ILLNESS
89 year old male with no pmhx of cad, bph, aortic stenosis, hypothyroid, on asa, here after passing out last night while waliking up the steps and hit face on steps. got up and went to bed. was called this am by cardiologist and noted to have 5 sec pause at the time of the syncope and fall and came to ER. no cp, no sob. no dizzines. c/o pain to left eye, right shoulder and neck 89 year old male with history of CAD s/p CABG/stents, HTN, HLD, aortic stenosis, hypothyroid, on asa, here after passing out last night while waliking up the steps and hit face on steps. got up and went to bed. was called this am by cardiologist and noted to have 5 sec pause at the time of the syncope and fall and came to ER. no cp, no sob. no dizzines. c/o pain to left eye, right shoulder and neck History mainly obtained from chart as patient is hard of hearing and unable to reach family (son and wife just left per patient's nurse)    89 year old male with history of CAD s/p CABG 1996/DEWAYNE 2004 on ASA 81mg, HTN, HLD, aortic stenosis, hypothyroid, BPH with chronic reed presents after passing out last night while walking up the steps and hit his left face on steps.  Patient got up and went to bed last night.  Patient was called this am by his cardiologist and noted to have 5 sec pause on loop recorder at the time of the syncope.  Patient denies prodrome prior to the episode.  Currently, he denies chest pain, SOB, dizziness, nausea/vomiting, headache, vision changes or fever/chill.. c/o pain to left eye, right shoulder and neck

## 2024-09-22 NOTE — H&P ADULT - NSHPSOCIALHISTORY_GEN_ALL_CORE
, lives with wife  hard of hearing  ambulates without assist , lives with wife  hard of hearing  ambulates with assist device

## 2024-09-22 NOTE — CONSULT NOTE ADULT - ATTENDING COMMENTS
I have made amendments to the documentation where necessary.     Additional comments:  89M on ASA81 for cardiac disease last took this AM, CABG 1996, PCI w/DEWAYNE 2004 pres s/p cardiogenic syncope. Fell last night and hit face on concrete steps. ILP w/ 5 sec pause. CTH w/ 6mm left frontal hyperdense heme. Plts 261m coags wnl.     -hold AC/AP at this time  -obtain repeat CTH for stability 3-4 hours following inital scan  -if short interval scan stable no c/i to medicine admission for cardiac w/u  -obtain long interval scan 12-24hrs after index CTH for stability  -keppra 500 bid x7 days

## 2024-09-22 NOTE — ED PROVIDER NOTE - CLINICAL SUMMARY MEDICAL DECISION MAKING FREE TEXT BOX
Ilir: 89 year old male on asa here with s/p syncope and zonia tseps las tnight. noted to have 5 sec pause on loop recorder at that time. is scheduled to have ppm placed in two days.   PE: att exam: patient awake alert NAD. left  eyelid ecchymosis, periorbital ecchymosis to left eye, peerla, eomi, + ttp cspine 5-7. + ttp right shoulder, + ttp right clavicle, + R first digit ecchyomsis volar surface and ttp,  LUNGS CTAB no wheeze no crackle. CARD RRR no m/r/g.  Abdomen soft NT ND no rebound no guarding no CVA tenderness. reed bag in place (chronic) EXT WWP CV 2+DP/PT bilaterally. neuro A&Ox3, no focal deficits, no t-spine/l-spien tenderness.  plan: will place in c-collar, placed on cardiac pads given pause and upcoming ppm placement, labs, painc ontrol, will get ct imaging to r/o ich, r/o facial bone fractures, r/o c-spine fractures, will assess for fratures in shoudler, finger, chest. will need admissio ngiven pause on loop recorder and syncope yesterday.

## 2024-09-22 NOTE — ED ADULT NURSE NOTE - NSFALLHARMRISKINTERV_ED_ALL_ED

## 2024-09-22 NOTE — ED ADULT NURSE NOTE - OBJECTIVE STATEMENT
89 year old male PMH of HTN, CAD, loop recorder - patient scheduled to have pacemaker, presents to the ED with son through triage complaining of 5 second pause on loop recorder 89 year old male PMH of Afib, BPH - chronic reed since 2/2024, HTN, CAD, loop recorder - patient scheduled to have pacemaker, presents to the ED with son through triage complaining of 5 second pause on loop recorder last night, at which time patient fell - does not remember the fall, +left eye hematoma and right shoulder pain, right palm bruising. Patient unsure of if he was walking up or down stairs, lives with spouse who did not witness fall, patient got himself up off ground and went to bed, did not want to come to hospital for fall however was called by Cardiology/EP this AM and was instructed to come to ED for pacemaker., MD Hazel at bedside for initial assessment and to discuss plan of care. MD Placed C-Collar on patient due to shoulder pain radiating to neck. Patient denies chest pain, sob, ha, n/v/d, abdominal pain, f/c, urinary symptoms, hematuria. 20g peripheral IV placed in R AC and labs drawn and sent. Patient placed on CM and pacer pads per MD orders. Patient undressed and placed into gown, call bell in hand and side rails up with bed in lowest position for safety. blanket provided. Comfort and safety provided.

## 2024-09-22 NOTE — ED ADULT TRIAGE NOTE - CHIEF COMPLAINT QUOTE
Has loop recorder w/ irregularities 2 weeks ago  Scheduled for PPM placement   Complaining of passing out x last night- noted with R eye hematoma. Loop recorder remarkable for 5 second pause during event.

## 2024-09-22 NOTE — ED ADULT NURSE REASSESSMENT NOTE - NS ED NURSE REASSESS COMMENT FT1
PT Mckee leg bag switched to gravity draining bag. PT readjusted in bed for comfort. PT denies any pain or discomfort at this time. PT maintained in C-collar. Safety and comfort measures initiated- bed placed in lowest position and side rails raised.

## 2024-09-22 NOTE — H&P ADULT - PROBLEM SELECTOR PLAN 4
confusion and disorientation later at night, suspect sundowning   - no clinical signs of infection, but will monitor leukocytosis 12.2, afebrile  - will continue neuro check Q4 with recent head trauma, exam non-focal  - will follow up long interval CTbrain

## 2024-09-22 NOTE — H&P ADULT - ADDITIONAL PE
T(F): 98.4 (22 Sep 2024 21:25), Max: 99.1 (22 Sep 2024 10:50)  HR: 74 (22 Sep 2024 21:25) (73 - 93)  BP: 160/100 (22 Sep 2024 21:25) (142/86 - 170/97)  RR: 18 (22 Sep 2024 21:25) (18 - 22)  SpO2: 95% (22 Sep 2024 21:25) (95% - 96%): room air

## 2024-09-23 DIAGNOSIS — I10 ESSENTIAL (PRIMARY) HYPERTENSION: ICD-10-CM

## 2024-09-23 DIAGNOSIS — E03.8 OTHER SPECIFIED HYPOTHYROIDISM: ICD-10-CM

## 2024-09-23 DIAGNOSIS — G93.40 ENCEPHALOPATHY, UNSPECIFIED: ICD-10-CM

## 2024-09-23 DIAGNOSIS — N40.1 BENIGN PROSTATIC HYPERPLASIA WITH LOWER URINARY TRACT SYMPTOMS: ICD-10-CM

## 2024-09-23 DIAGNOSIS — S12.9XXA FRACTURE OF NECK, UNSPECIFIED, INITIAL ENCOUNTER: ICD-10-CM

## 2024-09-23 DIAGNOSIS — I61.9 NONTRAUMATIC INTRACEREBRAL HEMORRHAGE, UNSPECIFIED: ICD-10-CM

## 2024-09-23 DIAGNOSIS — R55 SYNCOPE AND COLLAPSE: ICD-10-CM

## 2024-09-23 DIAGNOSIS — I25.10 ATHEROSCLEROTIC HEART DISEASE OF NATIVE CORONARY ARTERY WITHOUT ANGINA PECTORIS: ICD-10-CM

## 2024-09-23 LAB
A1C WITH ESTIMATED AVERAGE GLUCOSE RESULT: 5.5 % — SIGNIFICANT CHANGE UP (ref 4–5.6)
ANION GAP SERPL CALC-SCNC: 14 MMOL/L — SIGNIFICANT CHANGE UP (ref 5–17)
BASOPHILS # BLD AUTO: 0.04 K/UL — SIGNIFICANT CHANGE UP (ref 0–0.2)
BASOPHILS NFR BLD AUTO: 0.4 % — SIGNIFICANT CHANGE UP (ref 0–2)
BUN SERPL-MCNC: 16 MG/DL — SIGNIFICANT CHANGE UP (ref 7–23)
CALCIUM SERPL-MCNC: 9.9 MG/DL — SIGNIFICANT CHANGE UP (ref 8.4–10.5)
CHLORIDE SERPL-SCNC: 101 MMOL/L — SIGNIFICANT CHANGE UP (ref 96–108)
CHOLEST SERPL-MCNC: 135 MG/DL — SIGNIFICANT CHANGE UP
CO2 SERPL-SCNC: 25 MMOL/L — SIGNIFICANT CHANGE UP (ref 22–31)
CREAT SERPL-MCNC: 0.99 MG/DL — SIGNIFICANT CHANGE UP (ref 0.5–1.3)
EGFR: 73 ML/MIN/1.73M2 — SIGNIFICANT CHANGE UP
EOSINOPHIL # BLD AUTO: 0.27 K/UL — SIGNIFICANT CHANGE UP (ref 0–0.5)
EOSINOPHIL NFR BLD AUTO: 2.6 % — SIGNIFICANT CHANGE UP (ref 0–6)
ESTIMATED AVERAGE GLUCOSE: 111 MG/DL — SIGNIFICANT CHANGE UP (ref 68–114)
GLUCOSE BLDC GLUCOMTR-MCNC: 159 MG/DL — HIGH (ref 70–99)
GLUCOSE SERPL-MCNC: 96 MG/DL — SIGNIFICANT CHANGE UP (ref 70–99)
HCT VFR BLD CALC: 48.2 % — SIGNIFICANT CHANGE UP (ref 39–50)
HDLC SERPL-MCNC: 51 MG/DL — SIGNIFICANT CHANGE UP
HGB BLD-MCNC: 16.5 G/DL — SIGNIFICANT CHANGE UP (ref 13–17)
IMM GRANULOCYTES NFR BLD AUTO: 0.3 % — SIGNIFICANT CHANGE UP (ref 0–0.9)
LIPID PNL WITH DIRECT LDL SERPL: 65 MG/DL — SIGNIFICANT CHANGE UP
LYMPHOCYTES # BLD AUTO: 1.86 K/UL — SIGNIFICANT CHANGE UP (ref 1–3.3)
LYMPHOCYTES # BLD AUTO: 18.1 % — SIGNIFICANT CHANGE UP (ref 13–44)
MCHC RBC-ENTMCNC: 30.4 PG — SIGNIFICANT CHANGE UP (ref 27–34)
MCHC RBC-ENTMCNC: 34.2 GM/DL — SIGNIFICANT CHANGE UP (ref 32–36)
MCV RBC AUTO: 88.8 FL — SIGNIFICANT CHANGE UP (ref 80–100)
MONOCYTES # BLD AUTO: 0.93 K/UL — HIGH (ref 0–0.9)
MONOCYTES NFR BLD AUTO: 9.1 % — SIGNIFICANT CHANGE UP (ref 2–14)
NEUTROPHILS # BLD AUTO: 7.12 K/UL — SIGNIFICANT CHANGE UP (ref 1.8–7.4)
NEUTROPHILS NFR BLD AUTO: 69.5 % — SIGNIFICANT CHANGE UP (ref 43–77)
NON HDL CHOLESTEROL: 84 MG/DL — SIGNIFICANT CHANGE UP
NRBC # BLD: 0 /100 WBCS — SIGNIFICANT CHANGE UP (ref 0–0)
PLATELET # BLD AUTO: 231 K/UL — SIGNIFICANT CHANGE UP (ref 150–400)
POTASSIUM SERPL-MCNC: 3.7 MMOL/L — SIGNIFICANT CHANGE UP (ref 3.5–5.3)
POTASSIUM SERPL-SCNC: 3.7 MMOL/L — SIGNIFICANT CHANGE UP (ref 3.5–5.3)
RBC # BLD: 5.43 M/UL — SIGNIFICANT CHANGE UP (ref 4.2–5.8)
RBC # FLD: 14.6 % — HIGH (ref 10.3–14.5)
SODIUM SERPL-SCNC: 140 MMOL/L — SIGNIFICANT CHANGE UP (ref 135–145)
TRIGL SERPL-MCNC: 104 MG/DL — SIGNIFICANT CHANGE UP
TSH SERPL-MCNC: 3.21 UIU/ML — SIGNIFICANT CHANGE UP (ref 0.27–4.2)
WBC # BLD: 10.25 K/UL — SIGNIFICANT CHANGE UP (ref 3.8–10.5)
WBC # FLD AUTO: 10.25 K/UL — SIGNIFICANT CHANGE UP (ref 3.8–10.5)

## 2024-09-23 PROCEDURE — 99221 1ST HOSP IP/OBS SF/LOW 40: CPT

## 2024-09-23 PROCEDURE — 70450 CT HEAD/BRAIN W/O DYE: CPT | Mod: 26

## 2024-09-23 RX ORDER — ATORVASTATIN CALCIUM 10 MG/1
40 TABLET, FILM COATED ORAL AT BEDTIME
Refills: 0 | Status: DISCONTINUED | OUTPATIENT
Start: 2024-09-23 | End: 2024-09-30

## 2024-09-23 RX ORDER — AMLODIPINE BESYLATE 5 MG
2.5 TABLET ORAL DAILY
Refills: 0 | Status: DISCONTINUED | OUTPATIENT
Start: 2024-09-23 | End: 2024-09-30

## 2024-09-23 RX ORDER — FINASTERIDE 5 MG/1
1 TABLET, FILM COATED ORAL
Refills: 0 | DISCHARGE

## 2024-09-23 RX ORDER — SODIUM CHLORIDE 0.9 % (FLUSH) 0.9 %
1000 SYRINGE (ML) INJECTION
Refills: 0 | Status: DISCONTINUED | OUTPATIENT
Start: 2024-09-23 | End: 2024-09-25

## 2024-09-23 RX ORDER — FINASTERIDE 5 MG/1
5 TABLET, FILM COATED ORAL DAILY
Refills: 0 | Status: DISCONTINUED | OUTPATIENT
Start: 2024-09-23 | End: 2024-09-30

## 2024-09-23 RX ORDER — TAMSULOSIN HCL 0.4 MG
1 CAPSULE ORAL
Refills: 0 | DISCHARGE

## 2024-09-23 RX ORDER — TAMSULOSIN HCL 0.4 MG
0.4 CAPSULE ORAL AT BEDTIME
Refills: 0 | Status: DISCONTINUED | OUTPATIENT
Start: 2024-09-23 | End: 2024-09-30

## 2024-09-23 RX ADMIN — ATORVASTATIN CALCIUM 40 MILLIGRAM(S): 10 TABLET, FILM COATED ORAL at 21:49

## 2024-09-23 RX ADMIN — Medication 0.4 MILLIGRAM(S): at 03:18

## 2024-09-23 RX ADMIN — Medication 650 MILLIGRAM(S): at 16:27

## 2024-09-23 RX ADMIN — Medication 50 MILLILITER(S): at 17:08

## 2024-09-23 RX ADMIN — Medication 50 MICROGRAM(S): at 05:58

## 2024-09-23 RX ADMIN — Medication 2.5 MILLIGRAM(S): at 05:58

## 2024-09-23 RX ADMIN — Medication 650 MILLIGRAM(S): at 17:45

## 2024-09-23 RX ADMIN — Medication 650 MILLIGRAM(S): at 08:47

## 2024-09-23 RX ADMIN — FINASTERIDE 5 MILLIGRAM(S): 5 TABLET, FILM COATED ORAL at 08:48

## 2024-09-23 RX ADMIN — Medication 500 MILLIGRAM(S): at 05:58

## 2024-09-23 RX ADMIN — Medication 500 MILLIGRAM(S): at 17:50

## 2024-09-23 RX ADMIN — Medication 0.4 MILLIGRAM(S): at 21:49

## 2024-09-23 NOTE — PATIENT PROFILE ADULT - FALL HARM RISK - FACTORS
Impaired gait/IV and/or equipment tethered to patient/Other medical problems/Poor balance/Syncope/Weakness

## 2024-09-23 NOTE — PATIENT PROFILE ADULT - MEDICATIONS/VISITS
Ace wrap applied to RLE and crutches/ education provided to pt, as ordered. Pt demonstrated proper use, all questions answered. AVS reviewed. Pt ambulated with crutches from ED.   
no

## 2024-09-23 NOTE — PHYSICAL THERAPY INITIAL EVALUATION ADULT - IMPAIRMENTS CONTRIBUTING TO GAIT DEVIATIONS, PT EVAL
Amb limited by c/o worsening lightheadedness in standing, see vitals flowsheet for orthostatic detail, technically +/impaired balance/decreased strength

## 2024-09-23 NOTE — PHYSICAL THERAPY INITIAL EVALUATION ADULT - GENERAL OBSERVATIONS, REHAB EVAL
Pt encountered supine in bed, + C collar donned. + PPM placement at chest. + IV locked + tele. wife at bedside.

## 2024-09-23 NOTE — CONSULT NOTE ADULT - SUBJECTIVE AND OBJECTIVE BOX
DATE OF SERVICE: 09-23-24 @ 13:05    CHIEF COMPLAINT:Patient is a 89y old  Male who presents with a chief complaint of s/p syncopal fall (23 Sep 2024 12:04)      HISTORY OF PRESENT ILLNESS:HPI:  History mainly obtained from chart as patient is hard of hearing and unable to reach family (son and wife just left per patient's nurse)    89 year old male with history of CAD s/p CABG 1996/DES 2004 on ASA 81mg, HTN, HLD, aortic stenosis, hypothyroid, BPH with chronic reed presents after passing out last night while walking up the steps and hit his left face on steps.  Patient got up and went to bed last night.  Patient was called this am by his cardiologist and noted to have 5 sec pause on loop recorder at the time of the syncope.  Patient denies prodrome prior to the episode.  Currently, he denies chest pain, SOB, dizziness, nausea/vomiting, headache, vision changes or fever/chill.. c/o pain to left eye, right shoulder and neck (22 Sep 2024 22:54)      PAST MEDICAL & SURGICAL HISTORY:  HTN (hypertension)      BPH (benign prostatic hyperplasia)      Hyperlipidemia      Hypothyroidism      Inguinal hernia, bilateral      Carpal tunnel syndrome of right wrist      Atrial fibrillation  post-surgery, resolved      CAD (coronary artery disease)      Stented coronary artery  2004      Low back pain      [place] (hard of hearing)  hearing aid bilateral      Aortic stenosis      S/P coronary angioplasty  2004 drug eluting      S/P CABG (coronary artery bypass graft)  07/1996 CABG x3      S/P tonsillectomy  at age 5 years old      S/P hernia repair  inquinal 2005      History of knee replacement, total, bilateral  right 2016  left 2011      History of carpal tunnel surgery  2014              MEDICATIONS:  amLODIPine   Tablet 2.5 milliGRAM(s) Oral daily        acetaminophen     Tablet .. 650 milliGRAM(s) Oral every 6 hours PRN  levETIRAcetam   Injectable 500 milliGRAM(s) IV Push every 12 hours      atorvastatin 40 milliGRAM(s) Oral at bedtime  finasteride 5 milliGRAM(s) Oral daily  levothyroxine 50 MICROGram(s) Oral daily    tamsulosin 0.4 milliGRAM(s) Oral at bedtime      FAMILY HISTORY:  Family history of hypertension    Family history of stroke    Family history of myocardial infarction        Non-contributory    SOCIAL HISTORY:    [x] Tobacco  [x] Drugs  [x] Alcohol    Allergies    No Known Allergies    Intolerances    	    REVIEW OF SYSTEMS:  CONSTITUTIONAL: No fever  EYES: No eye pain, visual disturbances, or discharge  ENMT:  No difficulty hearing, tinnitus  NECK: No pain or stiffness  RESPIRATORY: No cough, wheezing,  CARDIOVASCULAR: No chest pain, palpitations, passing out, dizziness, or leg swelling  GASTROINTESTINAL:  No nausea, vomiting, diarrhea or constipation. No melena.  GENITOURINARY: No dysuria, hematuria  NEUROLOGICAL: No stroke like symptoms  SKIN: No burning or lesions   ENDOCRINE: No heat or cold intolerance  MUSCULOSKELETAL: No joint pain or swelling  PSYCHIATRIC: No  anxiety, mood swings  HEME/LYMPH: No bleeding gums  ALLERGY AND IMMUNOLOGIC: No hives or eczema	    All other ROS negative    PHYSICAL EXAM:  T(C): 36.3 (09-23-24 @ 04:59), Max: 36.9 (09-22-24 @ 21:25)  HR: 74 (09-23-24 @ 04:59) (61 - 83)  BP: 126/89 (09-23-24 @ 06:15) (126/89 - 170/97)  RR: 18 (09-23-24 @ 04:59) (18 - 20)  SpO2: 95% (09-23-24 @ 04:59) (95% - 97%)  Wt(kg): --  I&O's Summary    22 Sep 2024 07:01  -  23 Sep 2024 07:00  --------------------------------------------------------  IN: 180 mL / OUT: 300 mL / NET: -120 mL    23 Sep 2024 07:01  -  23 Sep 2024 13:05  --------------------------------------------------------  IN: 250 mL / OUT: 0 mL / NET: 250 mL        Appearance: Normal	  HEENT:   Normal oral mucosa, EOMI	  Cardiovascular:  S1 S2, No JVD,    Respiratory: Lungs clear to auscultation	  Psychiatry: Alert  Gastrointestinal:  Soft, Non-tender, + BS	  Skin: No rashes   Neurologic: Non-focal  Extremities:  No edema  Vascular: Peripheral pulses palpable    	    	  	  CARDIAC MARKERS:  Labs personally reviewed by me                                  16.5   10.25 )-----------( 231      ( 23 Sep 2024 07:49 )             48.2     09-23    140  |  101  |  16  ----------------------------<  96  3.7   |  25  |  0.99    Ca    9.9      23 Sep 2024 07:49    TPro  8.2  /  Alb  4.7  /  TBili  1.2  /  DBili  x   /  AST  37  /  ALT  32  /  AlkPhos  86  09-22          EKG: Personally reviewed by me - SR 1st degree AV block HR 68  Radiology: Personally reviewed by me -   Chest X-ray  FINDINGS:  Median sternotomy wires, cardiomediastinal surgical clips, loop recorder,   and TAVR are noted.  The heart size is normal.  The lungs are clear.  No pleural effusion.  No pneumothorax.  No acute osseous abnormalities.  IMPRESSION:  Noacute findings in the chest.      Assessment /Plan:   89 year-old male with HTN, HLD, CAD s/p CABG 1996, PCI w/DEWAYNE 2004, aortic stenosis, hypothyroid, BPH presents with a syncopal fall last night and hit face on concrete steps, time correlating with 5 sec pause on loop recorder, CTH w/ 6mm left frontal hyperdense lesion w/o surrounding edema and C4-5 spinous process fracture admitted for likely PPM and monitoring of possible ICH      Problem/Plan - 1:  ·  Problem: Syncope   ·  Plan: syncopal fall with 5 sec pause on loop recorder  - TTE pending   - Possible pace maker scheduled for Tuesday      Problem/Plan - 2:  ·  Problem: Coronary artery disease  - will hold ASA  - Continue statin.    Problem/Plan - 3:  ·  Problem: Hypertension  - Continue Norvasc 2.5mg with holding parameters.    Problem/Plan - 4:  ·  Problem: Intraparenchymal hemorrhage of brain.   -  s/p syncopal fall with head trauma  - ASA on hold  - CTH revealing stable 0.8 cm hyperdense focus in the high left frontal region, which   could represent a hemorrhagic contusion versus cavernoma        Differential diagnosis and plan of care discussed with patient after the evaluation. Counseling on diet, nutritional counseling, weight management, exercise and medication compliance was done.   Advanced care planning/advanced directives discussed with patient/family. DNR status including forceful chest compressions to attempt to restart the heart, ventilator support/artificial breathing, electric shock, artificial nutrition, health care proxy, Molst form all discussed with pt. Pt wishes to consider. More than fifteen minutes spent on discussing advanced directives.     CORTNEY Elena DO [facility]  Cardiovascular Medicine  [address] [place] [address] Dr, [address]  Available for call or text via [employer]  Office [phone]   DATE OF SERVICE: 09-23-24 @ 13:05    CHIEF COMPLAINT:Patient is a 89y old  Male who presents with a chief complaint of s/p syncopal fall (23 Sep 2024 12:04)      HISTORY OF PRESENT ILLNESS:HPI:  History mainly obtained from chart as patient is hard of hearing and unable to reach family (son and wife just left per patient's nurse)    89 year old male with history of CAD s/p CABG 1996/DES 2004 on ASA 81mg, HTN, HLD, aortic stenosis, hypothyroid, BPH with chronic reed presents after passing out last night while walking up the steps and hit his left face on steps.  Patient got up and went to bed last night.  Patient was called this am by his cardiologist and noted to have 5 sec pause on loop recorder at the time of the syncope.  Patient denies prodrome prior to the episode.  Currently, he denies chest pain, SOB, dizziness, nausea/vomiting, headache, vision changes or fever/chill.. c/o pain to left eye, right shoulder and neck (22 Sep 2024 22:54)      PAST MEDICAL & SURGICAL HISTORY:  HTN (hypertension)      BPH (benign prostatic hyperplasia)      Hyperlipidemia      Hypothyroidism      Inguinal hernia, bilateral      Carpal tunnel syndrome of right wrist      Atrial fibrillation  post-surgery, resolved      CAD (coronary artery disease)      Stented coronary artery  2004      Low back pain      [place] (hard of hearing)  hearing aid bilateral      Aortic stenosis      S/P coronary angioplasty  2004 drug eluting      S/P CABG (coronary artery bypass graft)  07/1996 CABG x3      S/P tonsillectomy  at age 5 years old      S/P hernia repair  inquinal 2005      History of knee replacement, total, bilateral  right 2016  left 2011      History of carpal tunnel surgery  2014              MEDICATIONS:  amLODIPine   Tablet 2.5 milliGRAM(s) Oral daily        acetaminophen     Tablet .. 650 milliGRAM(s) Oral every 6 hours PRN  levETIRAcetam   Injectable 500 milliGRAM(s) IV Push every 12 hours      atorvastatin 40 milliGRAM(s) Oral at bedtime  finasteride 5 milliGRAM(s) Oral daily  levothyroxine 50 MICROGram(s) Oral daily    tamsulosin 0.4 milliGRAM(s) Oral at bedtime      FAMILY HISTORY:  Family history of hypertension    Family history of stroke    Family history of myocardial infarction        Non-contributory    SOCIAL HISTORY:    [x] Tobacco  [x] Drugs  [x] Alcohol    Allergies    No Known Allergies    Intolerances    	    REVIEW OF SYSTEMS:  CONSTITUTIONAL: No fever  EYES: No eye pain, visual disturbances, or discharge  ENMT:  No difficulty hearing, tinnitus  NECK: No pain or stiffness  RESPIRATORY: No cough, wheezing,  CARDIOVASCULAR: No chest pain, palpitations, passing out, dizziness, or leg swelling  GASTROINTESTINAL:  No nausea, vomiting, diarrhea or constipation. No melena.  GENITOURINARY: No dysuria, hematuria  NEUROLOGICAL: No stroke like symptoms  SKIN: No burning or lesions   ENDOCRINE: No heat or cold intolerance  MUSCULOSKELETAL: No joint pain or swelling  PSYCHIATRIC: No  anxiety, mood swings  HEME/LYMPH: No bleeding gums  ALLERGY AND IMMUNOLOGIC: No hives or eczema	    All other ROS negative    PHYSICAL EXAM:  T(C): 36.3 (09-23-24 @ 04:59), Max: 36.9 (09-22-24 @ 21:25)  HR: 74 (09-23-24 @ 04:59) (61 - 83)  BP: 126/89 (09-23-24 @ 06:15) (126/89 - 170/97)  RR: 18 (09-23-24 @ 04:59) (18 - 20)  SpO2: 95% (09-23-24 @ 04:59) (95% - 97%)  Wt(kg): --  I&O's Summary    22 Sep 2024 07:01  -  23 Sep 2024 07:00  --------------------------------------------------------  IN: 180 mL / OUT: 300 mL / NET: -120 mL    23 Sep 2024 07:01  -  23 Sep 2024 13:05  --------------------------------------------------------  IN: 250 mL / OUT: 0 mL / NET: 250 mL        Appearance: Normal	  HEENT:   Normal oral mucosa, EOMI	  Cardiovascular:  S1 S2, No JVD,    Respiratory: Lungs clear to auscultation	  Psychiatry: Alert  Gastrointestinal:  Soft, Non-tender, + BS	  Skin: No rashes   Neurologic: Non-focal  Extremities:  No edema  Vascular: Peripheral pulses palpable    	    	  	  CARDIAC MARKERS:  Labs personally reviewed by me                                  16.5   10.25 )-----------( 231      ( 23 Sep 2024 07:49 )             48.2     09-23    140  |  101  |  16  ----------------------------<  96  3.7   |  25  |  0.99    Ca    9.9      23 Sep 2024 07:49    TPro  8.2  /  Alb  4.7  /  TBili  1.2  /  DBili  x   /  AST  37  /  ALT  32  /  AlkPhos  86  09-22          EKG: Personally reviewed by me - SR 1st degree AV block HR 68  Radiology: Personally reviewed by me -   Chest X-ray  FINDINGS:  Median sternotomy wires, cardiomediastinal surgical clips, loop recorder,   and TAVR are noted.  The heart size is normal.  The lungs are clear.  No pleural effusion.  No pneumothorax.  No acute osseous abnormalities.  IMPRESSION:  Noacute findings in the chest.      Assessment /Plan:   89 year-old male with HTN, HLD, CAD s/p CABG 1996, PCI w/DEWAYNE 2004, aortic stenosis, hypothyroid, BPH presents with a syncopal fall last night and hit face on concrete steps, time correlating with 5 sec pause on loop recorder, CTH w/ 6mm left frontal hyperdense lesion w/o surrounding edema and C4-5 spinous process fracture admitted for likely PPM and monitoring of possible ICH      Problem/Plan - 1:  ·  Problem: Syncope   ·  Plan: syncopal fall with 5 sec pause on loop recorder  - TTE pending   - Plan for pacemaker scheduled for Tuesday    Problem/Plan - 2:  ·  Problem: Coronary artery disease  - will hold ASA  - Continue statin.    Problem/Plan - 3:  ·  Problem: Hypertension  - Continue Norvasc 2.5mg with holding parameters.    Problem/Plan - 4:  ·  Problem: Intraparenchymal hemorrhage of brain.   -  s/p syncopal fall with head trauma  - ASA on hold  - CTH revealing stable 0.8 cm hyperdense focus in the high left frontal region, which could represent a hemorrhagic contusion versus cavernoma  - Neurology and neurosurg fu            Differential diagnosis and plan of care discussed with patient after the evaluation. Counseling on diet, nutritional counseling, weight management, exercise and medication compliance was done.   Advanced care planning/advanced directives discussed with patient/family. DNR status including forceful chest compressions to attempt to restart the heart, ventilator support/artificial breathing, electric shock, artificial nutrition, health care proxy, Molst form all discussed with pt. Pt wishes to consider. More than fifteen minutes spent on discussing advanced directives.     CORTNEY Elena DO [facility]  Cardiovascular Medicine  [address] [place] [address] Dr, [address]  Available for call or text via [employer]  Office [phone]

## 2024-09-23 NOTE — CONSULT NOTE ADULT - SUBJECTIVE AND OBJECTIVE BOX
HPI:    Gal Hassan is an 89 year old male with HTN, HLD, hypothroidism, AS s/p TAVR, CAD s/p CABG, and wide complex tachycardia s/p ILR admitted following syncope and fall.     On 9/9/24, his ILR triggered a red alert for 3 seconds pauses occurring between 2-4 PM. These were not associated with any symptoms and he denies lightheadedness or syncope. Pacemaker implantation was scheduled for 9/24/24. On Saturday evening, he was climbing the stairs when he suddenly lost consciousness, falling down the stairs. He noted pain in his head, neck, and right shoulder, but did not immediately present to the ED. A red alert on his ILR was triggered noting a 6 second pause correlating with the time of his syncope. He was advised to present to the ED for further evaluation. Upon arrival to the ED, he was afebrile and hemodynamically stable. 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 These findings were stable on repeat CT. Labs are otherwise unremarkable and he currently notes discomfort from wearing a hard cervical collar. He otherwise denies chest discomfort, lightheadedness, or palpitations.      PMHx:   HTN (hypertension)  BPH (benign prostatic hyperplasia)  Hyperlipidemia  S/P coronary artery stent placement  Hypothyroidism  Inguinal hernia, bilateral  Carpal tunnel syndrome of right wrist  Atrial fibrillation  CAD (coronary artery disease)  Stented coronary artery  Low back pain  [place] (hard of hearing)  Aortic stenosis    PSHx:   S/P coronary angioplasty  S/P knee replacement, left  S/P CABG (coronary artery bypass graft)  S/P tonsillectomy  S/P hernia repair  History of knee replacement, total, bilateral  History of carpal tunnel surgery    Allergies:  No Known Allergies    Current Medications:   acetaminophen     Tablet .. 650 milliGRAM(s) Oral every 6 hours PRN  amLODIPine   Tablet 2.5 milliGRAM(s) Oral daily  atorvastatin 40 milliGRAM(s) Oral at bedtime  finasteride 5 milliGRAM(s) Oral daily  levETIRAcetam   Injectable 500 milliGRAM(s) IV Push every 12 hours  levothyroxine 50 MICROGram(s) Oral daily  tamsulosin 0.4 milliGRAM(s) Oral at bedtime      FAMILY HISTORY:  Family history of hypertension  Family history of stroke  Family history of myocardial infarction    Social History:  Smoking History: Denies  Alcohol Use: Denies  Drug Use: Denies    Physical Exam:  T(F): 97.3 (09-23), Max: 98.4 (09-22)  HR: 74 (09-23) (61 - 83)  BP: 126/89 (09-23) (126/89 - 170/97)  RR: 18 (09-23)  SpO2: 95% (09-23)    Gen: NAD  HEENT: Left orbital ecchymosis, hard cervical collar in place  CV: RRR, no MRG  Lungs: CTAB  Abdomen: Soft, NT, ND  Extremities: WWP, no edema                        16.5   10.25 )-----------( 231      ( 23 Sep 2024 07:49 )             48.2     09-23    140  |  101  |  16  ----------------------------<  96  3.7   |  25  |  0.99    Ca    9.9      23 Sep 2024 07:49    TPro  8.2  /  Alb  4.7  /  TBili  1.2  /  DBili  x   /  AST  37  /  ALT  32  /  AlkPhos  86  09-22    PT/INR - ( 22 Sep 2024 11:23 )   PT: 10.5 sec;   INR: 1.00 ratio         PTT - ( 22 Sep 2024 11:23 )  PTT:29.3 sec      TTE 9/18/24:  Normal left ventricle size, thickness and normal appearing systolic  function. LVEF 55-60 %.  Grade 1 diastolic dysfunction.  Normal LA size.  Mildly enlarged right atrium size.  The right ventricle was not clearly visualized.  Well-seated bioprosthetic aortic valve with preserved function.  Mild mitral regurgitation.  Mild pulmonic regurgitation.  Normal pulmonary artery pressure.  Dilation of ascending aorta 4.6 cm.

## 2024-09-23 NOTE — PHYSICAL THERAPY INITIAL EVALUATION ADULT - GAIT DEVIATIONS NOTED, PT EVAL
[employer], ataxic, shuffled/decreased fracisco/increased time in double stance/decreased step length/decreased stride length/decreased weight-shifting ability

## 2024-09-23 NOTE — PROGRESS NOTE ADULT - ASSESSMENT
89 year-old male with HTN, HLD, CAD s/p CABG 1996, PCI w/DEWAYNE 2004, aortic stenosis, hypothyroid, BPH presents with a syncopal fall last night and hit face on concrete steps, time correlating with 5 sec pause on loop recorder, CTH w/ 6mm left frontal hyperdense lesion w/o surrounding edema and C4-5 spinous process fracture admitted for likely PPM and monitoring of possible ICH      Syncope, cardiogenic.   ·  Plan: syncopal fall with 5 sec pause on loop recorder  - tele monitor, R2 pad in place  - will check TTE  - will likely need cardiac pace maker  - Cardiology consulted and EP consulted     Traumatic Intraparenchymal hemorrhage of brain.   s/p syncopal fall with head trauma, short interval CTH without significant change  - will hold ASA  - will follow up 12-14 hours CTH for stability  - Q4 hours neuro check and Keppra 500mg bid for seizure prophylaxis  - Neurosurgery appreciated.    Traumatic fracture of cervical spine.   - s/p syncopal fall with acute displaced c4 on c5 posterior spinous process fracture  - C-collar in place  - PT eval and WBAT b/l LE  - Ortho consult appreciated.    Acute encephalopathy.   - confusion and disorientation later at night, suspect sundowning   - no clinical signs of infection, but will monitor leukocytosis 12.2, afebrile  - will continue neuro check Q4 with recent head trauma, exam non-focal  - will follow up long interval CTbrain.    CAD (coronary artery disease).   - will hold ASA give brain bleed   - will continue statin.    HTN (hypertension).   - will continue Norvasc 2.5mg with holding parameters.    Other hypothyroidism.   - will continue Levothyroxine.    BPH with urinary obstruction.   ·  Plan: on chronic reed, bag changed in ED  - will continue Finasteride and Flomax    dvt ppx scd ac on hold 2/2 brain bleed   gi ppx: diet     Everton White MD   [facility]  [phone]

## 2024-09-23 NOTE — PHYSICAL THERAPY INITIAL EVALUATION ADULT - PERTINENT HX OF CURRENT PROBLEM, REHAB EVAL
Pt is an 89 year old male with PMH significant for CAD s/p CABG 1996/DEWAYNE 2004 on ASA 81mg, HTN, HLD, aortic stenosis, hypothyroid, BPH with chronic reed.  Pt presents after passing out last night while walking up the steps and hit his left face on steps.  Patient got up and went to bed last night.  Patient was called this AM by his cardiologist and noted to have 5 sec pause on loop recorder at the time of the syncope.  CTH w/ 6mm left frontal hyperdense lesion w/o surrounding edema and C4-5 spinous process fracture admitted for likely PPM and monitoring of possible ICH.  Pt given C-Collar.  Per ortho, WBAT BLE, plan for non-operative management.  Per neurosx, no acute neurosurgical intervention, hold AC/AP at this time, obtain repeat CTH for stability 3-4 hours following inital scan.  CXR(9/22): No acute findings in the chest.  XR Shoulder Right(9/22): No acute fractures or dislocations of visualized osseous structures.  XR Clavicle R(9/22): (-)  XR Hand Right(9/22): No acute fracture or dislocation of the visualized osseous structures. 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 A 0.6 cm hyperdense focus in the left frontal lobe without surrounding vasogenic edema, favored to represent a cavernoma. Cannot exclude multiple tiny acute hemorrhages in that area. Left anterior frontal scalp hematoma with extensive surrounding soft tissue swelling extending into the periorbital region. CT MAXILLOFACIAL: No acute facial bone fracture. CT CERVICAL SPINE: Acute displaced C4 on C5 posterior spinous process fractures. Large prevertebral hematoma spanning the C2 through C5 levels.  CT Head(9/22): Stable 0.8 cm hyperdense focus in the high left frontal region, which could represent a hemorrhagic contusion versus cavernoma. Adjacent smaller hyperdense subcortical foci are suspicious for microhemorrhages, also not significantly changed.

## 2024-09-23 NOTE — PHYSICAL THERAPY INITIAL EVALUATION ADULT - ADDITIONAL COMMENTS
Patient lives in private home with spouse, no stairs to enter, 12 steps to bedroom. Patient ambulates with cane or rollator, recently using rollator 2/2 weakness. Patient independent with ADLs Patient lives in private home with spouse, no stairs to enter, 12 steps to bedroom. Patient ambulates with cane or rollator, recently using rollator 2/2 weakness. Patient independent with ADLs. states they do have chair lift for stairs.

## 2024-09-23 NOTE — CONSULT NOTE ADULT - ASSESSMENT
Gal Hassan is an 89 year old male with HTN, HLD, hypothroidism, AS s/p TAVR, CAD s/p CABG, and wide complex tachycardia s/p ILR admitted following syncope and fall. His syncope occurred in the setting of sinus node dysfunction for which permanent pacemaker implantation is recommended. His fall resulted in cervical spine fractures that do not require surgical intervention.    -NPO after midnight  -Permanent pacemaker implantation tomorrow with Dr. Reyes  -Ensure left AC IV present  -No heparin or lovenox  -Maintain telemetry

## 2024-09-23 NOTE — PHYSICAL THERAPY INITIAL EVALUATION ADULT - TRANSFER SAFETY CONCERNS NOTED: SIT/STAND, REHAB EVAL
3 reps/decreased balance during turns/losing balance/decreased step length/decreased weight-shifting ability

## 2024-09-23 NOTE — PATIENT PROFILE ADULT - FALL HARM RISK - HARM RISK INTERVENTIONS
Assistance with ambulation/Assistance OOB with selected safe patient handling equipment/Communicate Risk of Fall with Harm to all staff/Discuss with provider need for PT consult/Monitor for mental status changes/Monitor gait and stability/Provide patient with walking aids - walker, cane, crutches/Reinforce activity limits and safety measures with patient and family/Reorient to person, place and time as needed/Review medications for side effects contributing to fall risk/Sit up slowly, dangle for a short time, stand at bedside before walking/Tailored Fall Risk Interventions/Use of alarms - bed, chair and/or voice tab/Visual Cue: Yellow wristband and red socks/Bed in lowest position, wheels locked, appropriate side rails in place/Call bell, personal items and telephone in reach/Instruct patient to call for assistance before getting out of bed or chair/Non-slip footwear when patient is out of bed/[place] to call system/Physically safe environment - no spills, clutter or unnecessary equipment/Purposeful Proactive Rounding/Room/bathroom lighting operational, light cord in reach

## 2024-09-24 LAB
BLD GP AB SCN SERPL QL: NEGATIVE — SIGNIFICANT CHANGE UP
RH IG SCN BLD-IMP: POSITIVE — SIGNIFICANT CHANGE UP

## 2024-09-24 RX ADMIN — FINASTERIDE 5 MILLIGRAM(S): 5 TABLET, FILM COATED ORAL at 11:33

## 2024-09-24 RX ADMIN — Medication 0.4 MILLIGRAM(S): at 21:33

## 2024-09-24 RX ADMIN — Medication 2.5 MILLIGRAM(S): at 05:54

## 2024-09-24 RX ADMIN — Medication 650 MILLIGRAM(S): at 13:34

## 2024-09-24 RX ADMIN — Medication 500 MILLIGRAM(S): at 17:06

## 2024-09-24 RX ADMIN — Medication 650 MILLIGRAM(S): at 14:30

## 2024-09-24 RX ADMIN — ATORVASTATIN CALCIUM 40 MILLIGRAM(S): 10 TABLET, FILM COATED ORAL at 21:33

## 2024-09-24 RX ADMIN — Medication 50 MICROGRAM(S): at 05:53

## 2024-09-24 RX ADMIN — Medication 500 MILLIGRAM(S): at 05:54

## 2024-09-24 NOTE — ADVANCED PRACTICE NURSE CONSULT - ASSESSMENT
arrived on unit, patient was found lying in a low air loss pressure redistribution support surface style bed. The patient  is unable to turn independently and staff assistance x 1was provided. Once turned,  able to view his skin, indwelling  urethral cathter present.   bilateral sacrum/ buttock there is  non- blanchable deep red discoloration and hyperpigmentation  noted to measure approximately 6 cm x 6 cm x 0cm,consistent with a deep tissue injury,  present on admission.  left heel is boggy and  there is non- blanchable deep  red discoloration  noted to measure approximately 3 cm x 3 cm x 0cm, consistent with a deep tissue injury,  present on admission.  right heel is boggy and  there is non- blanchable deep  red discoloration  noted to measure approximately 3 cm x 3 cm x 0cm.  consistent with a deep tissue injury,  present on admission.  patient  was educated  on the importance  for turning  and positioning  every 2 hours. The use of waffle cushion when out of bed  to chair,  and to shift his Weight every 2 hours while in chair. The importance a keeping his skin clean and dry and  to offload feet/heels , and optimal nutrition.   arrived on unit, patient was found lying in a low air loss pressure redistribution support surface style bed. The patient  is unable to turn independently and staff assistance x 1was provided. Once turned,  able to view his skin, indwelling  urethral cathter present.   facial ecchymosis noted on patient with upper and lower body bilateral areas of ecchymosis, patient with recent fall prior  to hospitalization.     bilateral sacrum/ buttock there is  non- blanchable deep red discoloration and hyperpigmentation  noted to measure approximately 6 cm x 6 cm x 0cm,consistent with a deep tissue injury,  present on admission.  left heel is boggy and  there is non- blanchable deep  red discoloration  noted to measure approximately 3 cm x 3 cm x 0cm, consistent with a deep tissue injury,  present on admission.  right heel is boggy and  there is non- blanchable deep  red discoloration  noted to measure approximately 3 cm x 3 cm x 0cm.  consistent with a deep tissue injury,  present on admission.  patient  was educated  on the importance  for turning  and positioning  every 2 hours. The use of waffle cushion when out of bed  to chair,  and to shift his Weight every 2 hours while in chair. The importance a keeping his skin clean and dry and  to offload feet/heels , and optimal nutrition.

## 2024-09-24 NOTE — ADVANCED PRACTICE NURSE CONSULT - REASON FOR CONSULT
Consult to assess patient skin initiated by RN for sacrum deep tissue injury present on admission.     History of Present Illness:  Reason for Admission: s/p syncopal fall  History of Present Illness:   History mainly obtained from chart as patient is hard of hearing and unable to reach family (son and wife just left per patient's nurse)    89 year old male with history of CAD s/p CABG 1996/DEWAYNE 2004 on ASA 81mg, HTN, HLD, aortic stenosis, hypothyroid, BPH with chronic reed presents after passing out last night while walking up the steps and hit his left face on steps.  Patient got up and went to bed last night.  Patient was called this am by his cardiologist and noted to have 5 sec pause on loop recorder at the time of the syncope.  Patient denies prodrome prior to the episode.  Currently, he denies chest pain, SOB, dizziness, nausea/vomiting, headache, vision changes or fever/chill.. c/o pain to left eye, right shoulder and neck

## 2024-09-24 NOTE — PROGRESS NOTE ADULT - ASSESSMENT
89 year-old male with HTN, HLD, CAD s/p CABG 1996, PCI w/DEWAYNE 2004, aortic stenosis, hypothyroid, BPH presents with a syncopal fall last night and hit face on concrete steps, time correlating with 5 sec pause on loop recorder, CTH w/ 6mm left frontal hyperdense lesion w/o surrounding edema and C4-5 spinous process fracture admitted for likely PPM and monitoring of possible ICH      Syncope, cardiogenic.   ·  Plan: syncopal fall with 5 sec pause on loop recorder  - tele monitor, R2 pad in place  - TTE noted   - Plan for PPM today with EP Dr Reyes  - Cardiology consulted and EP consulted     Traumatic Intraparenchymal hemorrhage of brain.   s/p syncopal fall with head trauma, short interval CTH without significant change  - will hold ASA  - Ct brain stable ; f/u NSGY  - Q4 hours neuro check and Keppra 500mg bid for seizure prophylaxis  - Neurosurgery appreciated.    Traumatic fracture of cervical spine.   - s/p syncopal fall with acute displaced c4 on c5 posterior spinous process fracture  - C-collar in place  - PT eval and WBAT b/l LE  - Ortho consult appreciated.    Acute encephalopathy.   - confusion and disorientation later at night, suspect sundowning   - no clinical signs of infection, but will monitor leukocytosis 12.2, afebrile  - will continue neuro check Q4 with recent head trauma, exam non-focal  - will follow up long interval CTbrain.    CAD (coronary artery disease).   - will hold ASA give brain bleed   - will continue statin.    HTN (hypertension).   - will continue Norvasc 2.5mg with holding parameters.    Other hypothyroidism.   - will continue Levothyroxine.    BPH with urinary obstruction.   ·  Plan: on chronic reed, bag changed in ED  - will continue Finasteride and Flomax    dvt ppx scd ac on hold 2/2 brain bleed   gi ppx: diet     Everton White MD   [facility]  [phone]

## 2024-09-24 NOTE — ADVANCED PRACTICE NURSE CONSULT - RECOMMEDATIONS
Impression   bilateral sacrum/buttock deep tissue injury, present on admission.  left heel deep tissue injury present on admission  right heel deep tissue injury present on admission.     Recommendations   1. Bilateral sacrum/buttock deep tissue injury       mayuri rectal incontinence dermatitis  Topical therapy- sacral/bilateral buttocks- cleanse w/incontinent cleanser, pat dry & apply Lawrence cream  twice daily & prn soiling . Monitor for changes .  2. Right and left heel  deep tissue injury  - cleans heels with normal saline pat dry apply Cavilon daily.   - Elevate heels; apply Complete Cair air fluidized boots; ensure that the soles of the feet are not resting on the foot board of the bed.  3  Incontinent management - incontinent cleanser, pads,  mayuri care  BID  4. Maintain on an alternating air with low air loss surface   5. Turn & reposition every 2 hr; Use positioning pillow to turn and reposition, soft pillow between bony prominences; continue measures to decrease friction/shear/pressure.  6. Nutrition optimization.  7.  Place waffle cushion when out of bed to chair .   will not follow , please recall for new issues  plan of care reviewed with covering staff Gina Kumar (RN)

## 2024-09-25 LAB
ALBUMIN SERPL ELPH-MCNC: 3.7 G/DL — SIGNIFICANT CHANGE UP (ref 3.3–5)
ALP SERPL-CCNC: 72 U/L — SIGNIFICANT CHANGE UP (ref 40–120)
ALT FLD-CCNC: 27 U/L — SIGNIFICANT CHANGE UP (ref 10–45)
ANION GAP SERPL CALC-SCNC: 14 MMOL/L — SIGNIFICANT CHANGE UP (ref 5–17)
AST SERPL-CCNC: 35 U/L — SIGNIFICANT CHANGE UP (ref 10–40)
BILIRUB SERPL-MCNC: 1.2 MG/DL — SIGNIFICANT CHANGE UP (ref 0.2–1.2)
BUN SERPL-MCNC: 18 MG/DL — SIGNIFICANT CHANGE UP (ref 7–23)
CA-I BLD-SCNC: 1.2 MMOL/L — SIGNIFICANT CHANGE UP (ref 1.15–1.33)
CALCIUM SERPL-MCNC: 9.4 MG/DL — SIGNIFICANT CHANGE UP (ref 8.4–10.5)
CHLORIDE SERPL-SCNC: 103 MMOL/L — SIGNIFICANT CHANGE UP (ref 96–108)
CO2 SERPL-SCNC: 22 MMOL/L — SIGNIFICANT CHANGE UP (ref 22–31)
CREAT SERPL-MCNC: 0.93 MG/DL — SIGNIFICANT CHANGE UP (ref 0.5–1.3)
EGFR: 79 ML/MIN/1.73M2 — SIGNIFICANT CHANGE UP
GLUCOSE BLDC GLUCOMTR-MCNC: 106 MG/DL — HIGH (ref 70–99)
GLUCOSE SERPL-MCNC: 118 MG/DL — HIGH (ref 70–99)
HCT VFR BLD CALC: 43.7 % — SIGNIFICANT CHANGE UP (ref 39–50)
HGB BLD-MCNC: 15.4 G/DL — SIGNIFICANT CHANGE UP (ref 13–17)
MAGNESIUM SERPL-MCNC: 2 MG/DL — SIGNIFICANT CHANGE UP (ref 1.6–2.6)
MCHC RBC-ENTMCNC: 30.3 PG — SIGNIFICANT CHANGE UP (ref 27–34)
MCHC RBC-ENTMCNC: 35.2 GM/DL — SIGNIFICANT CHANGE UP (ref 32–36)
MCV RBC AUTO: 85.9 FL — SIGNIFICANT CHANGE UP (ref 80–100)
NRBC # BLD: 0 /100 WBCS — SIGNIFICANT CHANGE UP (ref 0–0)
PHOSPHATE SERPL-MCNC: 2.6 MG/DL — SIGNIFICANT CHANGE UP (ref 2.5–4.5)
PLATELET # BLD AUTO: 250 K/UL — SIGNIFICANT CHANGE UP (ref 150–400)
POTASSIUM SERPL-MCNC: 3.6 MMOL/L — SIGNIFICANT CHANGE UP (ref 3.5–5.3)
POTASSIUM SERPL-SCNC: 3.6 MMOL/L — SIGNIFICANT CHANGE UP (ref 3.5–5.3)
PROT SERPL-MCNC: 6.9 G/DL — SIGNIFICANT CHANGE UP (ref 6–8.3)
RBC # BLD: 5.09 M/UL — SIGNIFICANT CHANGE UP (ref 4.2–5.8)
RBC # FLD: 13.8 % — SIGNIFICANT CHANGE UP (ref 10.3–14.5)
SODIUM SERPL-SCNC: 139 MMOL/L — SIGNIFICANT CHANGE UP (ref 135–145)
WBC # BLD: 13.07 K/UL — HIGH (ref 3.8–10.5)
WBC # FLD AUTO: 13.07 K/UL — HIGH (ref 3.8–10.5)

## 2024-09-25 PROCEDURE — 93010 ELECTROCARDIOGRAM REPORT: CPT | Mod: 76

## 2024-09-25 PROCEDURE — 71045 X-RAY EXAM CHEST 1 VIEW: CPT | Mod: 26

## 2024-09-25 PROCEDURE — 93010 ELECTROCARDIOGRAM REPORT: CPT | Mod: 77

## 2024-09-25 RX ORDER — MULTI VITAMIN/MINERAL SUPPLEMENT WITH ASCORBIC ACID, NIACIN, PYRIDOXINE, PANTOTHENIC ACID, FOLIC ACID, RIBOFLAVIN, THIAMIN, BIOTIN, COBALAMIN AND ZINC. 60; 20; 12.5; 10; 10; 1.7; 1.5; 1; .3; .006 MG/1; MG/1; MG/1; MG/1; MG/1; MG/1; MG/1; MG/1; MG/1; MG/1
1 TABLET, COATED ORAL DAILY
Refills: 0 | Status: DISCONTINUED | OUTPATIENT
Start: 2024-09-25 | End: 2024-09-30

## 2024-09-25 RX ORDER — CEFAZOLIN SODIUM 1 G
2000 VIAL (EA) INJECTION EVERY 8 HOURS
Refills: 0 | Status: COMPLETED | OUTPATIENT
Start: 2024-09-25 | End: 2024-09-26

## 2024-09-25 RX ORDER — CEPHALEXIN 500 MG
250 CAPSULE ORAL EVERY 6 HOURS
Refills: 0 | Status: COMPLETED | OUTPATIENT
Start: 2024-09-26 | End: 2024-09-27

## 2024-09-25 RX ORDER — METOPROLOL TARTRATE 50 MG
5 TABLET ORAL ONCE
Refills: 0 | Status: COMPLETED | OUTPATIENT
Start: 2024-09-25 | End: 2024-09-25

## 2024-09-25 RX ADMIN — Medication 0.4 MILLIGRAM(S): at 21:21

## 2024-09-25 RX ADMIN — Medication 500 MILLIGRAM(S): at 18:51

## 2024-09-25 RX ADMIN — Medication 50 MICROGRAM(S): at 05:06

## 2024-09-25 RX ADMIN — Medication 650 MILLIGRAM(S): at 06:00

## 2024-09-25 RX ADMIN — Medication 500 MILLIGRAM(S): at 05:07

## 2024-09-25 RX ADMIN — Medication 5 MILLIGRAM(S): at 04:25

## 2024-09-25 RX ADMIN — Medication 100 MILLIGRAM(S): at 21:19

## 2024-09-25 RX ADMIN — Medication 2.5 MILLIGRAM(S): at 05:06

## 2024-09-25 RX ADMIN — Medication 40 MILLIEQUIVALENT(S): at 05:49

## 2024-09-25 RX ADMIN — Medication 650 MILLIGRAM(S): at 05:06

## 2024-09-25 RX ADMIN — ATORVASTATIN CALCIUM 40 MILLIGRAM(S): 10 TABLET, FILM COATED ORAL at 21:21

## 2024-09-25 NOTE — DIETITIAN INITIAL EVALUATION ADULT - ENERGY INTAKE
No available documented PO intake available per nursing flowsheets.  Patient NPO during visit. Upon diet advancement, offered oral nutrition shake to optimize PO intake. Family accepting.

## 2024-09-25 NOTE — PRE-ANESTHESIA EVALUATION ADULT - NSATTENDATTESTRD_GEN_ALL_CORE
The patient has been re-examined and I agree with the above assessment or I updated with my findings. Unknown

## 2024-09-25 NOTE — DIETITIAN INITIAL EVALUATION ADULT - NSICDXPASTMEDICALHX_GEN_ALL_CORE_FT
PAST MEDICAL HISTORY:  Aortic stenosis     Atrial fibrillation post-surgery, resolved    BPH (benign prostatic hyperplasia)     CAD (coronary artery disease)     Carpal tunnel syndrome of right wrist     [place] (hard of hearing) hearing aid bilateral    HTN (hypertension)     Hyperlipidemia     Hypothyroidism     Inguinal hernia, bilateral     Low back pain     Stented coronary artery 2004

## 2024-09-25 NOTE — DIETITIAN INITIAL EVALUATION ADULT - PERTINENT MEDS FT
[facility]  Diabetes Consult Note    Saul Robledo Patient Status:  Inpatient    1944 MRN [id number]   [facility] ([place], [place]) 3NE-A Attending Keyla Monroy MD   Hosp Day # 0 PCP Laurence Herrera MD     Reason for Consult:     New onset type 2 d subcutaneous insulin injections, monitors his blood glucose and tries to follow a diet of chicken, fish, fruits and vegetables. She stated her son, Mary Jane Moore is on his way in and also has type 2 diabetes.   The patient viewed the videos, \"Understanding Diabet American-speaking, at the [facility] on Tuesday, 1/30/18 at 4:30 pm.  See AVS for appointment.        Education Provided:    · Taught survival skills:  · Provided \"Understanding Diabetes: Basic Survival Skills\" booklet in American; assisted wander MEDICATIONS  (STANDING):  amLODIPine   Tablet 2.5 milliGRAM(s) Oral daily  atorvastatin 40 milliGRAM(s) Oral at bedtime  finasteride 5 milliGRAM(s) Oral daily  levETIRAcetam   Injectable 500 milliGRAM(s) IV Push every 12 hours  levothyroxine 50 MICROGram(s) Oral daily  tamsulosin 0.4 milliGRAM(s) Oral at bedtime    MEDICATIONS  (PRN):  acetaminophen     Tablet .. 650 milliGRAM(s) Oral every 6 hours PRN Temp greater or equal to 38C (100.4F), Mild Pain (1 - 3)

## 2024-09-25 NOTE — DIETITIAN INITIAL EVALUATION ADULT - REASON
Patient seen sleeping, unarousable during visit. Unable to report consent for exam. RD to perform nutrition-focused physical exam at follow up or as able.

## 2024-09-25 NOTE — DIETITIAN INITIAL EVALUATION ADULT - NS FNS DIET ORDER
Diet, NPO after Midnight:      NPO Start Date: 24-Sep-2024,   NPO Start Time: 23:59 (09-24-24 @ 20:43)

## 2024-09-25 NOTE — DIETITIAN INITIAL EVALUATION ADULT - LITERATURE/VIDEOS GIVEN
Patient currently NPO, diet education not appropriate or warranted at this time. RD remains available should diet education become indicated.

## 2024-09-25 NOTE — DIETITIAN INITIAL EVALUATION ADULT - PHYSCIAL ASSESSMENT
- Per Cindy PHILIP RD notes recent weights: 184.6lbs (2/03/2024), 199.5lbs (2/04/2024), 194.7lbs (9/22/024). 5.5% weight gain x 7 months indicated.     - Per family, denies recent unintentional weight loss prior to admission . Reports usual body weight ~190lbs.

## 2024-09-25 NOTE — DIETITIAN INITIAL EVALUATION ADULT - PERTINENT LABORATORY DATA
09-25    139  |  103  |  18  ----------------------------<  118[H]  3.6   |  22  |  0.93    Ca    9.4      25 Sep 2024 04:45  Phos  2.6     09-25  Mg     2.0     09-25    TPro  6.9  /  Alb  3.7  /  TBili  1.2  /  DBili  x   /  AST  35  /  ALT  27  /  AlkPhos  72  09-25  POCT Blood Glucose.: 106 mg/dL (09-25-24 @ 04:09)  A1C with Estimated Average Glucose Result: 5.5 % (09-23-24 @ 07:49)

## 2024-09-25 NOTE — DIETITIAN INITIAL EVALUATION ADULT - ADD RECOMMEND
1. Upon PO diet advancement, recommend low sodium diet.  2. Upon PO diet advancement, recommend trial Ensure Plus Chocolate once daily to optimize nutrition.   3. Recommend daily MVI and Ascorbic Acid to promote wound healing per medical team discretion.   4. Monitor routine weights, nutrition related labs, diet advancements, PO intake and tolerance, oral nutrition supplement compliance, and skin integrity.

## 2024-09-25 NOTE — PROVIDER CONTACT NOTE (OTHER) - SITUATION
patient's heart rate sustaining 130-140s. patient's heart rate sustaining 130-140s. patient pending PPM today due to hx of pauses.

## 2024-09-25 NOTE — DIETITIAN INITIAL EVALUATION ADULT - ORAL INTAKE PTA/DIET HISTORY
Patient visited. Patient seen sleeping, unarousable during visit. Family present during visit. Able to provide nutrition history. Per wife, denies Patient adhering to a therapeutic diet prior to admission. Patient's son reports Patient with decreased appetite prior to admission, timeframe unclear. NFKA or intolerances reported. Per wife, denies Patient supplementing with micronutrients prior to admission.

## 2024-09-25 NOTE — DIETITIAN INITIAL EVALUATION ADULT - OTHER INFO
Glucose 118 (H), POCT  (H). HbA1c 5.5% (9/23/2024), indicating good glycemic control. No hx DM noted.

## 2024-09-25 NOTE — DIETITIAN INITIAL EVALUATION ADULT - PROBLEM SELECTOR PLAN 1
syncopal fall with 5 sec pause on loop recorder  - tele monitor, R2 pad in place  - will check TTE  - will likely need cardiac pace maker  - Cardiology consult to see in am

## 2024-09-25 NOTE — DIETITIAN INITIAL EVALUATION ADULT - NSFNSPHYEXAMSKINFT_GEN_A_CORE
Per wound care 9/24/2024, "Bilateral sacrum/buttock deep tissue injury, present on admission. Left heel deep tissue injury present on admission. Right heel deep tissue injury present on admission."

## 2024-09-25 NOTE — CHART NOTE - NSCHARTNOTEFT_GEN_A_CORE
CT head reviewed, stable.    -Long interval CT head noncon any time after 1AM 9/23 for stability  -no c/i to medicine admission for cardiac w/u
Due to necessity for pacemaker placement, C collar may be removed. Care must be taken to avoid hyperflexing or hyperextending the neck. Please place c collar after procedure.
89 year old male w/hx of CABG, CAD, first degree HB, NPO awaiting PPM in am called for pt sustaining HR of 140 w/multifocal PVCs. Pt is asymptomatic. No chest pain, no shortness of breath, no palpitations, no dizziness, no N/V, no abdominal pain, no headache, no numbness or tingling. Pt was noted to be sustained on tele. This started with a PAC at 356am and was terminated at 440am. Pt had been NPO all yesterday awaiting PPM for pauses. Procedure was cancelled yesterday. Has been NPOpMN    Vital Signs Last 24 Hrs  T(C): 36.7 (25 Sep 2024 04:00), Max: 36.8 (24 Sep 2024 11:09)  T(F): 98 (25 Sep 2024 04:00), Max: 98.3 (24 Sep 2024 11:09)  HR: 138 (25 Sep 2024 04:00) (67 - 138)  BP: 119/80 (25 Sep 2024 04:00) (119/80 - 162/96)  BP(mean): --  RR: 18 (25 Sep 2024 04:00) (18 - 18)  SpO2: 94% (25 Sep 2024 04:00) (93% - 95%)    GENERAL: NAD, well-groomed, well-developed, sleepy (normal mental status a/p nursing)  HEAD:  Atraumatic, Normocephalic  EYES: EOMI, PERRLA, conjunctiva and sclera clear  ENMT:  Moist mucous membranes   NECK: Supple, No JVD   NERVOUS SYSTEM:  Alert & Oriented X 3,moves all ext.  CHEST/LUNG: CTA bilaterally; No rales, rhonchi, wheezing   HEART: Rapid, irregular  ABDOMEN: Soft, Nontender, Nondistended; Bowel sounds present  EXTREMITIES:  2+ Peripheral Pulses, No cyanosis, or edema  SKIN: No rashes or lesions     a/p Rapid narrow complex tachycardia w/multifocal PVCs  12 lead: Sinus tachycardia w/multifocal PVCs, couplets  Metoprolol IVP: 2.5mg x 2 doses. Pt converted to sinus rhythm w/PVCs at 440am; BP now 132/86, HR 77, R: 20  Finger stick 106  CBC, CMP, Mg, Phos, Ca  Will follow and notify attending

## 2024-09-25 NOTE — DIETITIAN INITIAL EVALUATION ADULT - REASON INDICATOR FOR ASSESSMENT
Dietitian consult received for: Pressure injury stage 2 or >   Chart reviewed, events noted  Information obtained from: Patient's family, electronic medical record

## 2024-09-25 NOTE — PROVIDER CONTACT NOTE (OTHER) - SITUATION
ACP made aware that discussion done w/ clinical impact team on patient's medical history, increased confusion, and reed present prior to facility.

## 2024-09-25 NOTE — PROGRESS NOTE ADULT - ASSESSMENT
89 year-old male with HTN, HLD, CAD s/p CABG 1996, PCI w/DEWAYNE 2004, aortic stenosis, hypothyroid, BPH presents with a syncopal fall last night and hit face on concrete steps, time correlating with 5 sec pause on loop recorder, CTH w/ 6mm left frontal hyperdense lesion w/o surrounding edema and C4-5 spinous process fracture admitted for likely PPM and monitoring of possible ICH.    Syncope, cardiogenic  syncopal fall with 5 sec pause on loop recorder  - tele monitor, R2 pad in place  - TTE noted   - Plan for PPM today with EP Dr Reyes  - awaiting ortho documentation ok to remove c collar     Traumatic Intraparenchymal hemorrhage of brain  s/p syncopal fall with head trauma, short interval CTH without significant change  - will hold ASA  - Ct brain stable  - Q4 hours neuro check and Keppra 500mg bid for seizure prophylaxis    Traumatic fracture of cervical spine  - s/p syncopal fall with acute displaced c4 on c5 posterior spinous process fracture  - C-collar in place, as dw ortho, okay to remove, awaiting documentation  - PT eval and WBAT b/l LE  - Ortho consult appreciated    CAD (coronary artery disease)  - will hold ASA give brain bleed   - will continue statin    HTN (hypertension)  - will continue Norvasc 2.5mg with holding parameters    Other hypothyroidism  - will continue Levothyroxine    BPH with urinary obstruction  chronic reed, bag changed in ED  - will continue Finasteride and Flomax    dvt ppx scd ac on hold 2/2 brain bleed     [facility]  [phone] 89 year-old male with HTN, HLD, CAD s/p CABG 1996, PCI w/DEWAYNE 2004, aortic stenosis, hypothyroid, BPH presents with a syncopal fall last night and hit face on concrete steps, time correlating with 5 sec pause on loop recorder, CTH w/ 6mm left frontal hyperdense lesion w/o surrounding edema and C4-5 spinous process fracture admitted for likely PPM and monitoring of possible ICH.    Syncope, cardiogenic  syncopal fall with 5 sec pause on loop recorder  - tele monitor, R2 pad in place  - TTE noted   - Plan for PPM today with EP Dr Hair  - trauma surgery to clear C collar    Traumatic Intraparenchymal hemorrhage of brain  s/p syncopal fall with head trauma, short interval CTH without significant change  - will hold ASA  - neuro checks and Keppra 500mg bid for seizure prophylaxis    Traumatic fracture of cervical spine  - s/p syncopal fall with acute displaced c4 on c5 posterior spinous process fracture  - C-collar in place, fu trauma surgery  - PT eval and WBAT b/l LE  - Ortho consult appreciated    CAD (coronary artery disease)  - will hold ASA give brain bleed   - will continue statin    HTN (hypertension)  - will continue Norvasc 2.5mg with holding parameters    Other hypothyroidism  - will continue Levothyroxine    BPH with urinary obstruction  chronic reed, bag changed in ED  - will continue Finasteride and Flomax    dvt ppx scd ac on hold 2/2 brain bleed     dw sons and wife at bedside    [facility]  [phone]

## 2024-09-26 LAB
ANION GAP SERPL CALC-SCNC: 13 MMOL/L — SIGNIFICANT CHANGE UP (ref 5–17)
BUN SERPL-MCNC: 23 MG/DL — SIGNIFICANT CHANGE UP (ref 7–23)
CALCIUM SERPL-MCNC: 9.5 MG/DL — SIGNIFICANT CHANGE UP (ref 8.4–10.5)
CHLORIDE SERPL-SCNC: 105 MMOL/L — SIGNIFICANT CHANGE UP (ref 96–108)
CO2 SERPL-SCNC: 23 MMOL/L — SIGNIFICANT CHANGE UP (ref 22–31)
CREAT SERPL-MCNC: 0.96 MG/DL — SIGNIFICANT CHANGE UP (ref 0.5–1.3)
EGFR: 76 ML/MIN/1.73M2 — SIGNIFICANT CHANGE UP
GLUCOSE SERPL-MCNC: 97 MG/DL — SIGNIFICANT CHANGE UP (ref 70–99)
HCT VFR BLD CALC: 45.7 % — SIGNIFICANT CHANGE UP (ref 39–50)
HGB BLD-MCNC: 15.9 G/DL — SIGNIFICANT CHANGE UP (ref 13–17)
MAGNESIUM SERPL-MCNC: 2.2 MG/DL — SIGNIFICANT CHANGE UP (ref 1.6–2.6)
MCHC RBC-ENTMCNC: 30.6 PG — SIGNIFICANT CHANGE UP (ref 27–34)
MCHC RBC-ENTMCNC: 34.8 GM/DL — SIGNIFICANT CHANGE UP (ref 32–36)
MCV RBC AUTO: 88.1 FL — SIGNIFICANT CHANGE UP (ref 80–100)
NRBC # BLD: 0 /100 WBCS — SIGNIFICANT CHANGE UP (ref 0–0)
PHOSPHATE SERPL-MCNC: 3.3 MG/DL — SIGNIFICANT CHANGE UP (ref 2.5–4.5)
PLATELET # BLD AUTO: 238 K/UL — SIGNIFICANT CHANGE UP (ref 150–400)
POTASSIUM SERPL-MCNC: 3.6 MMOL/L — SIGNIFICANT CHANGE UP (ref 3.5–5.3)
POTASSIUM SERPL-SCNC: 3.6 MMOL/L — SIGNIFICANT CHANGE UP (ref 3.5–5.3)
RBC # BLD: 5.19 M/UL — SIGNIFICANT CHANGE UP (ref 4.2–5.8)
RBC # FLD: 14.1 % — SIGNIFICANT CHANGE UP (ref 10.3–14.5)
SODIUM SERPL-SCNC: 141 MMOL/L — SIGNIFICANT CHANGE UP (ref 135–145)
WBC # BLD: 10.36 K/UL — SIGNIFICANT CHANGE UP (ref 3.8–10.5)
WBC # FLD AUTO: 10.36 K/UL — SIGNIFICANT CHANGE UP (ref 3.8–10.5)

## 2024-09-26 PROCEDURE — 71046 X-RAY EXAM CHEST 2 VIEWS: CPT | Mod: 26

## 2024-09-26 PROCEDURE — 93010 ELECTROCARDIOGRAM REPORT: CPT

## 2024-09-26 RX ADMIN — Medication 650 MILLIGRAM(S): at 13:06

## 2024-09-26 RX ADMIN — Medication 250 MILLIGRAM(S): at 13:06

## 2024-09-26 RX ADMIN — Medication 100 MILLIGRAM(S): at 05:30

## 2024-09-26 RX ADMIN — Medication 650 MILLIGRAM(S): at 14:06

## 2024-09-26 RX ADMIN — MULTI VITAMIN/MINERAL SUPPLEMENT WITH ASCORBIC ACID, NIACIN, PYRIDOXINE, PANTOTHENIC ACID, FOLIC ACID, RIBOFLAVIN, THIAMIN, BIOTIN, COBALAMIN AND ZINC. 1 TABLET(S): 60; 20; 12.5; 10; 10; 1.7; 1.5; 1; .3; .006 TABLET, COATED ORAL at 13:06

## 2024-09-26 RX ADMIN — Medication 50 MICROGRAM(S): at 05:30

## 2024-09-26 RX ADMIN — Medication 500 MILLIGRAM(S): at 13:06

## 2024-09-26 RX ADMIN — Medication 500 MILLIGRAM(S): at 18:02

## 2024-09-26 RX ADMIN — Medication 0.4 MILLIGRAM(S): at 21:24

## 2024-09-26 RX ADMIN — Medication 2.5 MILLIGRAM(S): at 05:30

## 2024-09-26 RX ADMIN — Medication 250 MILLIGRAM(S): at 18:02

## 2024-09-26 RX ADMIN — ATORVASTATIN CALCIUM 40 MILLIGRAM(S): 10 TABLET, FILM COATED ORAL at 21:25

## 2024-09-26 RX ADMIN — FINASTERIDE 5 MILLIGRAM(S): 5 TABLET, FILM COATED ORAL at 13:06

## 2024-09-26 RX ADMIN — Medication 500 MILLIGRAM(S): at 05:30

## 2024-09-26 NOTE — PROVIDER CONTACT NOTE (OTHER) - ASSESSMENT
patient A&Ox2/3, no complaints of chest pain or discomfort, no dizziness. BP: 119/80, , Temp 98.0, O2 94% room air.
Patient AxOx2-3 w/ Increased confusion, restless.
/99  SpO2 95% on RA  HR 69  No c/o chest pain/SOB

## 2024-09-26 NOTE — PROGRESS NOTE ADULT - ASSESSMENT
89 year-old male with HTN, HLD, CAD s/p CABG 1996, PCI w/DEWAYNE 2004, aortic stenosis, hypothyroid, BPH presents with a syncopal fall last night and hit face on concrete steps, time correlating with 5 sec pause on loop recorder, CTH w/ 6mm left frontal hyperdense lesion w/o surrounding edema and C4-5 spinous process fracture admitted for likely PPM and monitoring of possible ICH.    Syncope, cardiogenic  syncopal fall with 5 sec pause on loop recorder  - sp PPM    Traumatic Intraparenchymal hemorrhage of brain  s/p syncopal fall with head trauma, short interval CTH without significant change  - will hold ASA  - neuro checks and Keppra 500mg bid for seizure prophylaxis  - right shoulder pain no fx on XR    Traumatic fracture of cervical spine  - s/p syncopal fall with acute displaced c4 on c5 posterior spinous process fracture  - C-collar in place, recommends to continue cervical collar on discharge and outpt fu 1 week with Dr Reyes  - PT eval and WBAT b/l LE    CAD (coronary artery disease)  - will hold ASA give brain bleed   - will continue statin    HTN (hypertension)  - will continue Norvasc 2.5mg with holding parameters    Other hypothyroidism  - will continue Levothyroxine    BPH with urinary obstruction  chronic reed, bag changed in ED  - will continue Finasteride and Flomax    dvt ppx scd ac on hold 2/2 brain bleed     dw sons and wife at bedside    dispo PT eval then dc plannning    [facility]  [phone] 89 year-old male with HTN, HLD, CAD s/p CABG 1996, PCI w/DEWAYNE 2004, aortic stenosis, hypothyroid, BPH presents with a syncopal fall last night and hit face on concrete steps, time correlating with 5 sec pause on loop recorder, CTH w/ 6mm left frontal hyperdense lesion w/o surrounding edema and C4-5 spinous process fracture admitted for likely PPM and monitoring of possible ICH.    Syncope, cardiogenic  syncopal fall with 5 sec pause on loop recorder  - sp PPM, fu AM CXR    Traumatic Intraparenchymal hemorrhage of brain  s/p syncopal fall with head trauma, short interval CTH without significant change  - will hold ASA  - neuro checks and Keppra 500mg bid for seizure prophylaxis  - right shoulder pain no fx on XR    Traumatic fracture of cervical spine  - s/p syncopal fall with acute displaced c4 on c5 posterior spinous process fracture  - C-collar in place, recommends to continue cervical collar on discharge and outpt fu 1 week with Dr Reyes  - PT eval and WBAT b/l LE    CAD (coronary artery disease)  - will hold ASA give brain bleed   - will continue statin    HTN (hypertension)  - will continue Norvasc 2.5mg with holding parameters    Other hypothyroidism  - will continue Levothyroxine    BPH with urinary obstruction  chronic reed, bag changed in ED  - will continue Finasteride and Flomax    dvt ppx scd ac on hold 2/2 brain bleed     dw sons and wife at bedside    dispo PT eval then dc plannning    [facility]  [phone]

## 2024-09-26 NOTE — PROVIDER CONTACT NOTE (OTHER) - ACTION/TREATMENT ORDERED:
Purposeful rounding remains in progress.
IVAN Miller notified, sugar checked, 12 lead ECG ordered, metoprolol 5mg IVP ordered.
EKG ordered. Mag and phosphate added to AM labs

## 2024-09-26 NOTE — PROVIDER CONTACT NOTE (OTHER) - BACKGROUND
Dx syncope and collapse  Hx CAD
89YO M admitted for Syncopal episode/Fall: likely cardiogenic, reed placed prior to admission
patient admitted for syncope and collapse, PMHx of aortic stenosis, stented coronary artery, CAD, Afib, HTN, BPH and HLD.

## 2024-09-27 RX ADMIN — Medication 650 MILLIGRAM(S): at 02:37

## 2024-09-27 RX ADMIN — Medication 250 MILLIGRAM(S): at 01:57

## 2024-09-27 RX ADMIN — Medication 2.5 MILLIGRAM(S): at 06:12

## 2024-09-27 RX ADMIN — Medication 650 MILLIGRAM(S): at 03:37

## 2024-09-27 RX ADMIN — Medication 500 MILLIGRAM(S): at 18:35

## 2024-09-27 RX ADMIN — Medication 500 MILLIGRAM(S): at 12:34

## 2024-09-27 RX ADMIN — MULTI VITAMIN/MINERAL SUPPLEMENT WITH ASCORBIC ACID, NIACIN, PYRIDOXINE, PANTOTHENIC ACID, FOLIC ACID, RIBOFLAVIN, THIAMIN, BIOTIN, COBALAMIN AND ZINC. 1 TABLET(S): 60; 20; 12.5; 10; 10; 1.7; 1.5; 1; .3; .006 TABLET, COATED ORAL at 12:34

## 2024-09-27 RX ADMIN — ATORVASTATIN CALCIUM 40 MILLIGRAM(S): 10 TABLET, FILM COATED ORAL at 21:23

## 2024-09-27 RX ADMIN — Medication 500 MILLIGRAM(S): at 06:12

## 2024-09-27 RX ADMIN — Medication 50 MICROGRAM(S): at 06:12

## 2024-09-27 RX ADMIN — Medication 250 MILLIGRAM(S): at 06:12

## 2024-09-27 RX ADMIN — FINASTERIDE 5 MILLIGRAM(S): 5 TABLET, FILM COATED ORAL at 12:34

## 2024-09-27 RX ADMIN — Medication 0.4 MILLIGRAM(S): at 21:23

## 2024-09-27 NOTE — PROGRESS NOTE ADULT - ASSESSMENT
89 year-old male with HTN, HLD, CAD s/p CABG 1996, PCI w/DEWAYNE 2004, aortic stenosis, hypothyroid, BPH presents with a syncopal fall last night and hit face on concrete steps, time correlating with 5 sec pause on loop recorder, CTH w/ 6mm left frontal hyperdense lesion w/o surrounding edema and C4-5 spinous process fracture admitted for likely PPM and monitoring of possible ICH.    Syncope, cardiogenic  syncopal fall with 5 sec pause on loop recorder  - sp PPM     Traumatic Intraparenchymal hemorrhage of brain  s/p syncopal fall with head trauma, short interval CTH without significant change  - will hold ASA  - neuro checks and Keppra 500mg bid for seizure prophylaxis  - right shoulder pain no fx on XR    Traumatic fracture of cervical spine  - s/p syncopal fall with acute displaced c4 on c5 posterior spinous process fracture  - C-collar in place, recommends to continue cervical collar on discharge and outpt fu 1 week with Dr Reyes  - PT eval and WBAT b/l LE  - Ortho consult appreciated    CAD (coronary artery disease)  - will hold ASA give brain bleed   - will continue statin    HTN (hypertension)  - will continue Norvasc 2.5mg with holding parameters    Other hypothyroidism  - will continue Levothyroxine    BPH with urinary obstruction  chronic reed, bag changed in ED  - will continue Finasteride and Flomax    dvt ppx scd ac on hold 2/2 brain bleed     dw family at bedside  dc planning to [facility]    [facility]  [phone]

## 2024-09-27 NOTE — OCCUPATIONAL THERAPY INITIAL EVALUATION ADULT - PERTINENT HX OF CURRENT PROBLEM, REHAB EVAL
89 year-old male with HTN, HLD, CAD s/p CABG 1996, PCI w/DEWAYNE 2004, aortic stenosis, hypothyroid, BPH presents with a syncopal fall last night and hit face on concrete steps, time correlating with 5 sec pause on loop recorder, CTH w/ 6mm left frontal hyperdense lesion w/o surrounding edema and C4-5 spinous process fracture admitted for likely PPM and monitoring of possible ICH.

## 2024-09-28 RX ADMIN — Medication 500 MILLIGRAM(S): at 05:32

## 2024-09-28 RX ADMIN — Medication 500 MILLIGRAM(S): at 12:02

## 2024-09-28 RX ADMIN — FINASTERIDE 5 MILLIGRAM(S): 5 TABLET, FILM COATED ORAL at 12:01

## 2024-09-28 RX ADMIN — Medication 500 MILLIGRAM(S): at 17:28

## 2024-09-28 RX ADMIN — Medication 0.4 MILLIGRAM(S): at 22:16

## 2024-09-28 RX ADMIN — Medication 50 MICROGRAM(S): at 05:32

## 2024-09-28 RX ADMIN — Medication 650 MILLIGRAM(S): at 13:29

## 2024-09-28 RX ADMIN — Medication 650 MILLIGRAM(S): at 14:29

## 2024-09-28 RX ADMIN — ATORVASTATIN CALCIUM 40 MILLIGRAM(S): 10 TABLET, FILM COATED ORAL at 22:16

## 2024-09-28 RX ADMIN — MULTI VITAMIN/MINERAL SUPPLEMENT WITH ASCORBIC ACID, NIACIN, PYRIDOXINE, PANTOTHENIC ACID, FOLIC ACID, RIBOFLAVIN, THIAMIN, BIOTIN, COBALAMIN AND ZINC. 1 TABLET(S): 60; 20; 12.5; 10; 10; 1.7; 1.5; 1; .3; .006 TABLET, COATED ORAL at 12:03

## 2024-09-28 RX ADMIN — Medication 2.5 MILLIGRAM(S): at 05:32

## 2024-09-28 NOTE — PROGRESS NOTE ADULT - ASSESSMENT
89 year-old male with HTN, HLD, CAD s/p CABG 1996, PCI w/DEWAYNE 2004, aortic stenosis, hypothyroid, BPH presents with a syncopal fall last night and hit face on concrete steps, time correlating with 5 sec pause on loop recorder, CTH w/ 6mm left frontal hyperdense lesion w/o surrounding edema and C4-5 spinous process fracture admitted for likely PPM and monitoring of possible ICH.    Syncope, cardiogenic  syncopal fall with 5 sec pause on loop recorder  - sp PPM     Traumatic Intraparenchymal hemorrhage of brain  s/p syncopal fall with head trauma, short interval CTH without significant change  - will hold ASA  - neuro checks and Keppra 500mg bid for seizure prophylaxis  - right shoulder pain no fx on XR    Traumatic fracture of cervical spine  - s/p syncopal fall with acute displaced c4 on c5 posterior spinous process fracture  - C-collar in place, recommends to continue cervical collar on discharge and outpt fu 1 week with Dr Reyes  - PT eval and WBAT b/l LE  - Ortho consult appreciated    CAD (coronary artery disease)  - will hold ASA give brain bleed   - will continue statin    HTN (hypertension)  - will continue Norvasc 2.5mg with holding parameters    Other hypothyroidism  - will continue Levothyroxine    BPH with urinary obstruction  chronic reed, bag changed in ED  - will continue Finasteride and Flomax    dvt ppx scd ac on hold 2/2 brain bleed     dw family at bedside  dc planning to [facility]    [employer]  [phone]

## 2024-09-29 RX ADMIN — Medication 500 MILLIGRAM(S): at 11:25

## 2024-09-29 RX ADMIN — MULTI VITAMIN/MINERAL SUPPLEMENT WITH ASCORBIC ACID, NIACIN, PYRIDOXINE, PANTOTHENIC ACID, FOLIC ACID, RIBOFLAVIN, THIAMIN, BIOTIN, COBALAMIN AND ZINC. 1 TABLET(S): 60; 20; 12.5; 10; 10; 1.7; 1.5; 1; .3; .006 TABLET, COATED ORAL at 11:25

## 2024-09-29 RX ADMIN — FINASTERIDE 5 MILLIGRAM(S): 5 TABLET, FILM COATED ORAL at 11:25

## 2024-09-29 RX ADMIN — Medication 650 MILLIGRAM(S): at 23:30

## 2024-09-29 RX ADMIN — ATORVASTATIN CALCIUM 40 MILLIGRAM(S): 10 TABLET, FILM COATED ORAL at 21:46

## 2024-09-29 RX ADMIN — Medication 0.4 MILLIGRAM(S): at 21:47

## 2024-09-29 RX ADMIN — Medication 50 MICROGRAM(S): at 05:13

## 2024-09-29 RX ADMIN — Medication 500 MILLIGRAM(S): at 05:13

## 2024-09-29 RX ADMIN — Medication 500 MILLIGRAM(S): at 17:20

## 2024-09-29 RX ADMIN — Medication 2.5 MILLIGRAM(S): at 06:24

## 2024-09-29 RX ADMIN — Medication 650 MILLIGRAM(S): at 22:57

## 2024-09-29 NOTE — PROGRESS NOTE ADULT - ASSESSMENT
89 year-old male with HTN, HLD, CAD s/p CABG 1996, PCI w/DEWAYNE 2004, aortic stenosis, hypothyroid, BPH presents with a syncopal fall last night and hit face on concrete steps, time correlating with 5 sec pause on loop recorder, CTH w/ 6mm left frontal hyperdense lesion w/o surrounding edema and C4-5 spinous process fracture admitted for likely PPM and monitoring of possible ICH.    Syncope, cardiogenic  syncopal fall with 5 sec pause on loop recorder  - sp PPM     Traumatic Intraparenchymal hemorrhage of brain  s/p syncopal fall with head trauma, short interval CTH without significant change  - will hold ASA  - neuro checks and Keppra 500mg bid for seizure prophylaxis  - right shoulder pain no fx on XR    Traumatic fracture of cervical spine  - s/p syncopal fall with acute displaced c4 on c5 posterior spinous process fracture  - C-collar in place, recommends to continue cervical collar on discharge and outpt fu 1 week with Dr Reyes  - PT eval and WBAT b/l LE  - Ortho consult appreciated    CAD (coronary artery disease)  - will hold ASA give brain bleed   - will continue statin    HTN (hypertension)  - will continue Norvasc 2.5mg with holding parameters    Other hypothyroidism  - will continue Levothyroxine    BPH with urinary obstruction  chronic reed, bag changed in ED  - will continue Finasteride and Flomax    dvt ppx scd ac on hold 2/2 brain bleed     dc planning to [place]    [employer]  [phone]

## 2024-09-30 ENCOUNTER — TRANSCRIPTION ENCOUNTER (OUTPATIENT)
Age: 89
End: 2024-09-30

## 2024-09-30 VITALS
DIASTOLIC BLOOD PRESSURE: 67 MMHG | RESPIRATION RATE: 18 BRPM | SYSTOLIC BLOOD PRESSURE: 137 MMHG | OXYGEN SATURATION: 96 % | TEMPERATURE: 98 F | HEART RATE: 80 BPM

## 2024-09-30 PROCEDURE — 83735 ASSAY OF MAGNESIUM: CPT

## 2024-09-30 PROCEDURE — C1894: CPT

## 2024-09-30 PROCEDURE — 86901 BLOOD TYPING SEROLOGIC RH(D): CPT

## 2024-09-30 PROCEDURE — 96375 TX/PRO/DX INJ NEW DRUG ADDON: CPT

## 2024-09-30 PROCEDURE — 96374 THER/PROPH/DIAG INJ IV PUSH: CPT

## 2024-09-30 PROCEDURE — 84443 ASSAY THYROID STIM HORMONE: CPT

## 2024-09-30 PROCEDURE — 86850 RBC ANTIBODY SCREEN: CPT

## 2024-09-30 PROCEDURE — 93005 ELECTROCARDIOGRAM TRACING: CPT

## 2024-09-30 PROCEDURE — 85027 COMPLETE CBC AUTOMATED: CPT

## 2024-09-30 PROCEDURE — 72125 CT NECK SPINE W/O DYE: CPT | Mod: MC

## 2024-09-30 PROCEDURE — 97162 PT EVAL MOD COMPLEX 30 MIN: CPT

## 2024-09-30 PROCEDURE — 99285 EMERGENCY DEPT VISIT HI MDM: CPT | Mod: 25

## 2024-09-30 PROCEDURE — 76377 3D RENDER W/INTRP POSTPROCES: CPT

## 2024-09-30 PROCEDURE — 85025 COMPLETE CBC W/AUTO DIFF WBC: CPT

## 2024-09-30 PROCEDURE — C1889: CPT

## 2024-09-30 PROCEDURE — 71046 X-RAY EXAM CHEST 2 VIEWS: CPT

## 2024-09-30 PROCEDURE — 84484 ASSAY OF TROPONIN QUANT: CPT

## 2024-09-30 PROCEDURE — 73130 X-RAY EXAM OF HAND: CPT

## 2024-09-30 PROCEDURE — 82330 ASSAY OF CALCIUM: CPT

## 2024-09-30 PROCEDURE — 80053 COMPREHEN METABOLIC PANEL: CPT

## 2024-09-30 PROCEDURE — 83036 HEMOGLOBIN GLYCOSYLATED A1C: CPT

## 2024-09-30 PROCEDURE — 73030 X-RAY EXAM OF SHOULDER: CPT

## 2024-09-30 PROCEDURE — C1769: CPT

## 2024-09-30 PROCEDURE — 85610 PROTHROMBIN TIME: CPT

## 2024-09-30 PROCEDURE — 82962 GLUCOSE BLOOD TEST: CPT

## 2024-09-30 PROCEDURE — C1887: CPT

## 2024-09-30 PROCEDURE — 97165 OT EVAL LOW COMPLEX 30 MIN: CPT

## 2024-09-30 PROCEDURE — 80048 BASIC METABOLIC PNL TOTAL CA: CPT

## 2024-09-30 PROCEDURE — 71045 X-RAY EXAM CHEST 1 VIEW: CPT

## 2024-09-30 PROCEDURE — 70450 CT HEAD/BRAIN W/O DYE: CPT | Mod: MC

## 2024-09-30 PROCEDURE — 84100 ASSAY OF PHOSPHORUS: CPT

## 2024-09-30 PROCEDURE — 36415 COLL VENOUS BLD VENIPUNCTURE: CPT

## 2024-09-30 PROCEDURE — 33208 INSRT HEART PM ATRIAL & VENT: CPT

## 2024-09-30 PROCEDURE — 70486 CT MAXILLOFACIAL W/O DYE: CPT | Mod: MC

## 2024-09-30 PROCEDURE — C1785: CPT

## 2024-09-30 PROCEDURE — 97161 PT EVAL LOW COMPLEX 20 MIN: CPT

## 2024-09-30 PROCEDURE — C1898: CPT

## 2024-09-30 PROCEDURE — 85730 THROMBOPLASTIN TIME PARTIAL: CPT

## 2024-09-30 PROCEDURE — 80061 LIPID PANEL: CPT

## 2024-09-30 PROCEDURE — 73000 X-RAY EXAM OF COLLAR BONE: CPT

## 2024-09-30 PROCEDURE — 86900 BLOOD TYPING SEROLOGIC ABO: CPT

## 2024-09-30 RX ORDER — ACETAMINOPHEN 325 MG
2 TABLET ORAL
Qty: 0 | Refills: 0 | DISCHARGE
Start: 2024-09-30

## 2024-09-30 RX ORDER — MULTI VITAMIN/MINERAL SUPPLEMENT WITH ASCORBIC ACID, NIACIN, PYRIDOXINE, PANTOTHENIC ACID, FOLIC ACID, RIBOFLAVIN, THIAMIN, BIOTIN, COBALAMIN AND ZINC. 60; 20; 12.5; 10; 10; 1.7; 1.5; 1; .3; .006 MG/1; MG/1; MG/1; MG/1; MG/1; MG/1; MG/1; MG/1; MG/1; MG/1
1 TABLET, COATED ORAL
Qty: 0 | Refills: 0 | DISCHARGE
Start: 2024-09-30

## 2024-09-30 RX ADMIN — Medication 650 MILLIGRAM(S): at 05:43

## 2024-09-30 RX ADMIN — Medication 50 MICROGRAM(S): at 05:43

## 2024-09-30 RX ADMIN — Medication 650 MILLIGRAM(S): at 06:15

## 2024-09-30 RX ADMIN — MULTI VITAMIN/MINERAL SUPPLEMENT WITH ASCORBIC ACID, NIACIN, PYRIDOXINE, PANTOTHENIC ACID, FOLIC ACID, RIBOFLAVIN, THIAMIN, BIOTIN, COBALAMIN AND ZINC. 1 TABLET(S): 60; 20; 12.5; 10; 10; 1.7; 1.5; 1; .3; .006 TABLET, COATED ORAL at 12:33

## 2024-09-30 RX ADMIN — Medication 500 MILLIGRAM(S): at 12:33

## 2024-09-30 RX ADMIN — Medication 2.5 MILLIGRAM(S): at 05:43

## 2024-09-30 RX ADMIN — FINASTERIDE 5 MILLIGRAM(S): 5 TABLET, FILM COATED ORAL at 12:33

## 2024-09-30 NOTE — DISCHARGE NOTE PROVIDER - CARE PROVIDERS DIRECT ADDRESSES
,[email or web address]

## 2024-09-30 NOTE — PROGRESS NOTE ADULT - SUBJECTIVE AND OBJECTIVE BOX
Patient is a 89y old  Male who presents with a chief complaint of s/p syncopal fall (23 Sep 2024 12:59)      SUBJECTIVE / OVERNIGHT EVENTS:  Patient seen and examined.,   Doing well.       Vital Signs Last 24 Hrs  T(C): 36.8 (24 Sep 2024 11:09), Max: 36.9 (23 Sep 2024 20:32)  T(F): 98.3 (24 Sep 2024 11:09), Max: 98.4 (23 Sep 2024 20:32)  HR: 67 (24 Sep 2024 11:09) (55 - 134)  BP: 146/91 (24 Sep 2024 11:09) (131/82 - 146/91)  BP(mean): --  RR: 18 (24 Sep 2024 11:09) (18 - 18)  SpO2: 95% (24 Sep 2024 11:09) (92% - 96%)    Parameters below as of 24 Sep 2024 11:09  Patient On (Oxygen Delivery Method): room air      I&O's Summary    23 Sep 2024 07:01  -  24 Sep 2024 07:00  --------------------------------------------------------  IN: 250 mL / OUT: 400 mL / NET: -150 mL    24 Sep 2024 07:01  -  24 Sep 2024 13:46  --------------------------------------------------------  IN: 240 mL / OUT: 0 mL / NET: 240 mL        PHYSICAL EXAM:  GENERAL: NAD, AAOx3  HEAD:  Atraumatic, Normocephalic  EYES: EOMI; conjunctiva and sclera clear  NECK: Supple, No JVD, No LAD  CHEST/LUNG: B/L air entry; No wheezes, rales or rhonci   HEART: Regular rate and rhythm; No murmurs, rubs, or gallops  ABDOMEN: Soft, Nontender, Nondistended; Bowel sounds present  EXTREMITIES:  2+ Peripheral Pulses, No clubbing, cyanosis, or edema  SKIN: No rashes or lesions    LABS:                        16.5   10.25 )-----------( 231      ( 23 Sep 2024 07:49 )             48.2     09-23    140  |  101  |  16  ----------------------------<  96  3.7   |  25  |  0.99    Ca    9.9      23 Sep 2024 07:49        CAPILLARY BLOOD GLUCOSE            Urinalysis Basic - ( 23 Sep 2024 07:49 )    Color: x / Appearance: x / SG: x / pH: x  Gluc: 96 mg/dL / Ketone: x  / Bili: x / Urobili: x   Blood: x / Protein: x / Nitrite: x   Leuk Esterase: x / RBC: x / WBC x   Sq Epi: x / Non Sq Epi: x / Bacteria: x        RADIOLOGY & ADDITIONAL TESTS:    Imaging Personally Reviewed:  [x] YES  [ ] NO    Consultant(s) Notes Reviewed:  [x] YES  [ ] NO      MEDICATIONS  (STANDING):  amLODIPine   Tablet 2.5 milliGRAM(s) Oral daily  atorvastatin 40 milliGRAM(s) Oral at bedtime  finasteride 5 milliGRAM(s) Oral daily  levETIRAcetam   Injectable 500 milliGRAM(s) IV Push every 12 hours  levothyroxine 50 MICROGram(s) Oral daily  sodium chloride 0.9%. 1000 milliLiter(s) (50 mL/Hr) IV Continuous <Continuous>  tamsulosin 0.4 milliGRAM(s) Oral at bedtime    MEDICATIONS  (PRN):  acetaminophen     Tablet .. 650 milliGRAM(s) Oral every 6 hours PRN Temp greater or equal to 38C (100.4F), Mild Pain (1 - 3)      Care Discussed with Consultants/Other Providers [x] YES  [ ] NO    HEALTH ISSUES - PROBLEM Dx:  Syncope, cardiogenic    Intraparenchymal hemorrhage of brain    CAD (coronary artery disease)    HTN (hypertension)    Other hypothyroidism    BPH with urinary obstruction    Acute encephalopathy    Traumatic fracture of cervical spine        
Patient is a 89y old  Male who presents with a chief complaint of s/p syncopal fall (27 Sep 2024 14:57)      SUBJECTIVE / OVERNIGHT EVENTS:    Patient seen and examined. no acute events.      Vital Signs Last 24 Hrs  T(C): 36.6 (28 Sep 2024 08:10), Max: 36.6 (27 Sep 2024 11:09)  T(F): 97.8 (28 Sep 2024 08:10), Max: 97.9 (27 Sep 2024 21:25)  HR: 79 (28 Sep 2024 08:10) (63 - 79)  BP: 133/88 (28 Sep 2024 08:10) (133/88 - 155/86)  BP(mean): --  RR: 18 (28 Sep 2024 08:10) (18 - 18)  SpO2: 98% (28 Sep 2024 08:10) (94% - 98%)    Parameters below as of 28 Sep 2024 08:10  Patient On (Oxygen Delivery Method): room air      I&O's Summary    27 Sep 2024 07:01  -  28 Sep 2024 07:00  --------------------------------------------------------  IN: 0 mL / OUT: 420 mL / NET: -420 mL        PE:  GENERAL: NAD, AAOx2  CHEST/LUNG: CTABL, No wheeze  HEART: Regular rate and rhythm; + murmur  ABDOMEN: Soft, Nontender, Nondistended; Bowel sounds present  EXTREMITIES:  2+ Peripheral Pulses, No edema  NEURO: No focal deficits      LABS:            CAPILLARY BLOOD GLUCOSE                RADIOLOGY & ADDITIONAL TESTS:    Imaging Personally Reviewed:  [x] YES  [ ] NO    Consultant(s) Notes Reviewed:  [x] YES  [ ] NO    MEDICATIONS  (STANDING):  amLODIPine   Tablet 2.5 milliGRAM(s) Oral daily  ascorbic acid 500 milliGRAM(s) Oral daily  atorvastatin 40 milliGRAM(s) Oral at bedtime  finasteride 5 milliGRAM(s) Oral daily  levETIRAcetam   Injectable 500 milliGRAM(s) IV Push every 12 hours  levothyroxine 50 MICROGram(s) Oral daily  multivitamin 1 Tablet(s) Oral daily  tamsulosin 0.4 milliGRAM(s) Oral at bedtime    MEDICATIONS  (PRN):  acetaminophen     Tablet .. 650 milliGRAM(s) Oral every 6 hours PRN Temp greater or equal to 38C (100.4F), Mild Pain (1 - 3)      Care Discussed with Consultants/Other Providers [x] YES  [ ] NO    HEALTH ISSUES - PROBLEM Dx:  Syncope, cardiogenic    Intraparenchymal hemorrhage of brain    CAD (coronary artery disease)    HTN (hypertension)    Other hypothyroidism    BPH with urinary obstruction    Acute encephalopathy    Traumatic fracture of cervical spine        
DATE OF SERVICE: 09-27-24 @ 14:57    Patient is a 89y old  Male who presents with a chief complaint of s/p syncopal fall (27 Sep 2024 11:38)      INTERVAL HISTORY:     REVIEW OF SYSTEMS:  CONSTITUTIONAL: No weakness  EYES/ENT: No visual changes;  No throat pain   NECK: No pain or stiffness  RESPIRATORY: No cough, wheezing; No shortness of breath  CARDIOVASCULAR: No chest pain or palpitations  GASTROINTESTINAL: No abdominal  pain. No nausea, vomiting, or hematemesis  GENITOURINARY: No dysuria, frequency or hematuria  NEUROLOGICAL: No stroke like symptoms  SKIN: No rashes    TELEMETRY Personally reviewed:  	  MEDICATIONS:  amLODIPine   Tablet 2.5 milliGRAM(s) Oral daily        PHYSICAL EXAM:  T(C): 36.6 (09-27-24 @ 11:09), Max: 36.8 (09-26-24 @ 16:41)  HR: 75 (09-27-24 @ 14:02) (57 - 82)  BP: 138/92 (09-27-24 @ 14:02) (127/84 - 145/97)  RR: 18 (09-27-24 @ 11:09) (18 - 19)  SpO2: 95% (09-27-24 @ 14:02) (93% - 95%)  Wt(kg): --  I&O's Summary    26 Sep 2024 07:01  -  27 Sep 2024 07:00  --------------------------------------------------------  IN: 360 mL / OUT: 1100 mL / NET: -740 mL          Appearance: In no distress	  HEENT:    PERRL, EOMI	  Cardiovascular:  S1 S2, No JVD  Respiratory: Lungs clear to auscultation	  Gastrointestinal:  Soft, Non-tender, + BS	  Vascularature:  No edema of LE  Psychiatric: Appropriate affect   Neuro: no acute focal deficits                               15.9   10.36 )-----------( 238      ( 26 Sep 2024 06:38 )             45.7     09-26    141  |  105  |  23  ----------------------------<  97  3.6   |  23  |  0.96    Ca    9.5      26 Sep 2024 07:21  Phos  3.3     09-26  Mg     2.2     09-26          Labs personally reviewed      ASSESSMENT/PLAN: 	    Problem/Plan - 1:  ·  Problem: Syncope   ·  Plan: syncopal fall with 5 sec pause on loop recorder  - TTE pending   - Pacemaker placed 9/25/24    Problem/Plan - 2:  ·  Problem: Coronary artery disease  - will hold ASA  - Continue statin.    Problem/Plan - 3:  ·  Problem: Hypertension  - Continue Norvasc 2.5mg with holding parameters.    Problem/Plan - 4:  ·  Problem: Intraparenchymal hemorrhage of brain.   -  s/p syncopal fall with head trauma  - ASA on hold  - CTH revealing stable 0.8 cm hyperdense focus in the high left frontal region, which could represent a hemorrhagic contusion versus cavernoma  - Neurology and neurosurg fu        Iolani Behrbom, AG-NP   Edmond Maya DO [facility]  Cardiovascular Medicine  [address] [place] [address], [address]  Available through call or text on [employer]  Office: [phone]  
DATE OF SERVICE: 09-25-24 @ 17:44    Patient is a 89y old  Male who presents with a chief complaint of Syncope and collapse     (25 Sep 2024 13:49)      INTERVAL HISTORY:     REVIEW OF SYSTEMS:  CONSTITUTIONAL: No weakness  EYES/ENT: No visual changes;  No throat pain   NECK: No pain or stiffness  RESPIRATORY: No cough, wheezing; No shortness of breath  CARDIOVASCULAR: No chest pain or palpitations  GASTROINTESTINAL: No abdominal  pain. No nausea, vomiting, or hematemesis  GENITOURINARY: No dysuria, frequency or hematuria  NEUROLOGICAL: No stroke like symptoms  SKIN: No rashes    TELEMETRY Personally reviewed:  	  MEDICATIONS:  amLODIPine   Tablet 2.5 milliGRAM(s) Oral daily        PHYSICAL EXAM:  T(C): 36.4 (09-25-24 @ 17:31), Max: 36.7 (09-25-24 @ 04:00)  HR: 73 (09-25-24 @ 17:31) (65 - 138)  BP: 160/97 (09-25-24 @ 17:31) (119/80 - 180/87)  RR: 18 (09-25-24 @ 17:31) (15 - 20)  SpO2: 96% (09-25-24 @ 17:31) (92% - 100%)  Wt(kg): --  I&O's Summary    24 Sep 2024 07:01  -  25 Sep 2024 07:00  --------------------------------------------------------  IN: 0 mL / OUT: 1300 mL / NET: -1300 mL    25 Sep 2024 07:01  -  25 Sep 2024 17:44  --------------------------------------------------------  IN: 0 mL / OUT: 400 mL / NET: -400 mL      Height (cm): 177.8 (09-25 @ 12:26)  Weight (kg): 88.5 (09-25 @ 12:26)  BMI (kg/m2): 28 (09-25 @ 12:26)  BSA (m2): 2.07 (09-25 @ 12:26)    Appearance: In no distress	  HEENT:    PERRL, EOMI	  Cardiovascular:  S1 S2, No JVD  Respiratory: Lungs clear to auscultation	  Gastrointestinal:  Soft, Non-tender, + BS	  Vascularature:  No edema of LE  Psychiatric: Appropriate affect   Neuro: no acute focal deficits                               15.4   13.07 )-----------( 250      ( 25 Sep 2024 04:45 )             43.7     09-25    139  |  103  |  18  ----------------------------<  118[H]  3.6   |  22  |  0.93    Ca    9.4      25 Sep 2024 04:45  Phos  2.6     09-25  Mg     2.0     09-25    TPro  6.9  /  Alb  3.7  /  TBili  1.2  /  DBili  x   /  AST  35  /  ALT  27  /  AlkPhos  72  09-25        Labs personally reviewed      ASSESSMENT/PLAN: 	    Problem/Plan - 1:  ·  Problem: Syncope   ·  Plan: syncopal fall with 5 sec pause on loop recorder  - TTE pending   - Pacemaker placed 9/25/24    Problem/Plan - 2:  ·  Problem: Coronary artery disease  - will hold ASA  - Continue statin.    Problem/Plan - 3:  ·  Problem: Hypertension  - Continue Norvasc 2.5mg with holding parameters.    Problem/Plan - 4:  ·  Problem: Intraparenchymal hemorrhage of brain.   -  s/p syncopal fall with head trauma  - ASA on hold  - CTH revealing stable 0.8 cm hyperdense focus in the high left frontal region, which could represent a hemorrhagic contusion versus cavernoma  - Neurology and neurosurg FIORELLA Harry, DO [facility]  Cardiovascular Medicine  [address] [facility], [address]  Available through call or text on [employer] TEAM[employer]  Office: [phone]  
Patient is a 89y old  Male who presents with a chief complaint of s/p syncopal fall (23 Sep 2024 05:05)      SUBJECTIVE / OVERNIGHT EVENTS:  Patient seen and examined. C Collar on. Wife at bedside.   NO pain.       Vital Signs Last 24 Hrs  T(C): 36.3 (23 Sep 2024 04:59), Max: 36.9 (22 Sep 2024 21:25)  T(F): 97.3 (23 Sep 2024 04:59), Max: 98.4 (22 Sep 2024 21:25)  HR: 74 (23 Sep 2024 04:59) (61 - 83)  BP: 126/89 (23 Sep 2024 06:15) (126/89 - 170/97)  BP(mean): 115 (22 Sep 2024 14:09) (115 - 119)  RR: 18 (23 Sep 2024 04:59) (18 - 20)  SpO2: 95% (23 Sep 2024 04:59) (95% - 97%)    Parameters below as of 23 Sep 2024 04:59  Patient On (Oxygen Delivery Method): room air      I&O's Summary    22 Sep 2024 07:01  -  23 Sep 2024 07:00  --------------------------------------------------------  IN: 180 mL / OUT: 300 mL / NET: -120 mL    23 Sep 2024 07:01  -  23 Sep 2024 12:00  --------------------------------------------------------  IN: 250 mL / OUT: 0 mL / NET: 250 mL        PHYSICAL EXAM:  GENERAL: NAD, AAOx2  HEAD:  Bruise on head, Normocephalic  EYES: EOMI; conjunctiva and sclera clear  NECK: Supple, No JVD, No LAD  CHEST/LUNG: B/L air entry; No wheezes, rales or rhonci   HEART: Regular rate and rhythm; No murmurs, rubs, or gallops  ABDOMEN: Soft, Nontender, Nondistended; Bowel sounds present  EXTREMITIES:  2+ Peripheral Pulses, No clubbing, cyanosis, or edema  SKIN: No rashes or lesions    LABS:                        16.5   10.25 )-----------( 231      ( 23 Sep 2024 07:49 )             48.2     09-23    140  |  101  |  16  ----------------------------<  96  3.7   |  25  |  0.99    Ca    9.9      23 Sep 2024 07:49    TPro  8.2  /  Alb  4.7  /  TBili  1.2  /  DBili  x   /  AST  37  /  ALT  32  /  AlkPhos  86  09-22    PT/INR - ( 22 Sep 2024 11:23 )   PT: 10.5 sec;   INR: 1.00 ratio         PTT - ( 22 Sep 2024 11:23 )  PTT:29.3 sec  CAPILLARY BLOOD GLUCOSE      POCT Blood Glucose.: 159 mg/dL (23 Sep 2024 04:25)        Urinalysis Basic - ( 23 Sep 2024 07:49 )    Color: x / Appearance: x / SG: x / pH: x  Gluc: 96 mg/dL / Ketone: x  / Bili: x / Urobili: x   Blood: x / Protein: x / Nitrite: x   Leuk Esterase: x / RBC: x / WBC x   Sq Epi: x / Non Sq Epi: x / Bacteria: x        RADIOLOGY & ADDITIONAL TESTS:    Imaging Personally Reviewed:  [x] YES  [ ] NO    Consultant(s) Notes Reviewed:  [x] YES  [ ] NO      MEDICATIONS  (STANDING):  amLODIPine   Tablet 2.5 milliGRAM(s) Oral daily  atorvastatin 40 milliGRAM(s) Oral at bedtime  finasteride 5 milliGRAM(s) Oral daily  levETIRAcetam   Injectable 500 milliGRAM(s) IV Push every 12 hours  levothyroxine 50 MICROGram(s) Oral daily  tamsulosin 0.4 milliGRAM(s) Oral at bedtime    MEDICATIONS  (PRN):  acetaminophen     Tablet .. 650 milliGRAM(s) Oral every 6 hours PRN Temp greater or equal to 38C (100.4F), Mild Pain (1 - 3)      Care Discussed with Consultants/Other Providers [x] YES  [ ] NO    HEALTH ISSUES - PROBLEM Dx:  Syncope, cardiogenic    Intraparenchymal hemorrhage of brain    CAD (coronary artery disease)    HTN (hypertension)    Other hypothyroidism    BPH with urinary obstruction    Acute encephalopathy    Traumatic fracture of cervical spine        
Patient is a 89y old  Male who presents with a chief complaint of s/p syncopal fall (25 Sep 2024 17:44)      SUBJECTIVE / OVERNIGHT EVENTS:    Patient seen and examined. pt co r shoulder pain.      Vital Signs Last 24 Hrs  T(C): 36.4 (26 Sep 2024 11:00), Max: 36.8 (26 Sep 2024 00:07)  T(F): 97.6 (26 Sep 2024 11:00), Max: 98.2 (26 Sep 2024 00:07)  HR: 73 (26 Sep 2024 11:00) (65 - 76)  BP: 139/92 (26 Sep 2024 11:00) (120/80 - 182/78)  BP(mean): 115 (25 Sep 2024 12:26) (115 - 115)  RR: 18 (26 Sep 2024 11:00) (15 - 20)  SpO2: 95% (26 Sep 2024 11:00) (92% - 100%)    Parameters below as of 26 Sep 2024 11:00  Patient On (Oxygen Delivery Method): room air      I&O's Summary    25 Sep 2024 07:01  -  26 Sep 2024 07:00  --------------------------------------------------------  IN: 105 mL / OUT: 1000 mL / NET: -895 mL    26 Sep 2024 07:01  -  26 Sep 2024 12:09  --------------------------------------------------------  IN: 240 mL / OUT: 0 mL / NET: 240 mL        PE:  GENERAL: NAD, AAOx2, cervical collar  CHEST/LUNG: CTABL, No wheeze  HEART: Regular rate and rhythm; + murmur  ABDOMEN: Soft, Nontender, Nondistended; Bowel sounds present  EXTREMITIES:  2+ Peripheral Pulses, No edema  NEURO: RUE limited 2/2 pain, no acute deficits    LABS:                        15.9   10.36 )-----------( 238      ( 26 Sep 2024 06:38 )             45.7     09-26    141  |  105  |  23  ----------------------------<  97  3.6   |  23  |  0.96    Ca    9.5      26 Sep 2024 07:21  Phos  3.3     09-26  Mg     2.2     09-26    TPro  6.9  /  Alb  3.7  /  TBili  1.2  /  DBili  x   /  AST  35  /  ALT  27  /  AlkPhos  72  09-25      CAPILLARY BLOOD GLUCOSE            Urinalysis Basic - ( 26 Sep 2024 07:21 )    Color: x / Appearance: x / SG: x / pH: x  Gluc: 97 mg/dL / Ketone: x  / Bili: x / Urobili: x   Blood: x / Protein: x / Nitrite: x   Leuk Esterase: x / RBC: x / WBC x   Sq Epi: x / Non Sq Epi: x / Bacteria: x        RADIOLOGY & ADDITIONAL TESTS:    Imaging Personally Reviewed:  [x] YES  [ ] NO    Consultant(s) Notes Reviewed:  [x] YES  [ ] NO    MEDICATIONS  (STANDING):  amLODIPine   Tablet 2.5 milliGRAM(s) Oral daily  ascorbic acid 500 milliGRAM(s) Oral daily  atorvastatin 40 milliGRAM(s) Oral at bedtime  cephalexin 250 milliGRAM(s) Oral every 6 hours  finasteride 5 milliGRAM(s) Oral daily  levETIRAcetam   Injectable 500 milliGRAM(s) IV Push every 12 hours  levothyroxine 50 MICROGram(s) Oral daily  multivitamin 1 Tablet(s) Oral daily  tamsulosin 0.4 milliGRAM(s) Oral at bedtime    MEDICATIONS  (PRN):  acetaminophen     Tablet .. 650 milliGRAM(s) Oral every 6 hours PRN Temp greater or equal to 38C (100.4F), Mild Pain (1 - 3)      Care Discussed with Consultants/Other Providers [x] YES  [ ] NO    HEALTH ISSUES - PROBLEM Dx:  Syncope, cardiogenic    Intraparenchymal hemorrhage of brain    CAD (coronary artery disease)    HTN (hypertension)    Other hypothyroidism    BPH with urinary obstruction    Acute encephalopathy    Traumatic fracture of cervical spine        
Patient is a 89y old  Male who presents with a chief complaint of s/p syncopal fall (26 Sep 2024 12:09)      SUBJECTIVE / OVERNIGHT EVENTS:    Patient seen and examined. no acute events. pending ROSITA      Vital Signs Last 24 Hrs  T(C): 36.6 (27 Sep 2024 11:09), Max: 36.8 (26 Sep 2024 16:41)  T(F): 97.8 (27 Sep 2024 11:09), Max: 98.2 (26 Sep 2024 16:41)  HR: 74 (27 Sep 2024 11:09) (57 - 82)  BP: 145/97 (27 Sep 2024 11:09) (127/84 - 145/97)  BP(mean): 19 (26 Sep 2024 16:41) (19 - 19)  RR: 18 (27 Sep 2024 11:09) (18 - 19)  SpO2: 95% (27 Sep 2024 11:09) (93% - 95%)    Parameters below as of 27 Sep 2024 11:09  Patient On (Oxygen Delivery Method): room air      I&O's Summary    26 Sep 2024 07:01  -  27 Sep 2024 07:00  --------------------------------------------------------  IN: 360 mL / OUT: 1100 mL / NET: -740 mL        PE:  GENERAL: NAD, AAOx2  CHEST/LUNG: CTABL, No wheeze  HEART: Regular rate and rhythm; + murmur  ABDOMEN: Soft, Nontender, Nondistended; Bowel sounds present  EXTREMITIES:  2+ Peripheral Pulses, No edema  NEURO: No focal deficits    LABS:                        15.9   10.36 )-----------( 238      ( 26 Sep 2024 06:38 )             45.7     09-26    141  |  105  |  23  ----------------------------<  97  3.6   |  23  |  0.96    Ca    9.5      26 Sep 2024 07:21  Phos  3.3     09-26  Mg     2.2     09-26        CAPILLARY BLOOD GLUCOSE            Urinalysis Basic - ( 26 Sep 2024 07:21 )    Color: x / Appearance: x / SG: x / pH: x  Gluc: 97 mg/dL / Ketone: x  / Bili: x / Urobili: x   Blood: x / Protein: x / Nitrite: x   Leuk Esterase: x / RBC: x / WBC x   Sq Epi: x / Non Sq Epi: x / Bacteria: x        RADIOLOGY & ADDITIONAL TESTS:    Imaging Personally Reviewed:  [x] YES  [ ] NO    Consultant(s) Notes Reviewed:  [x] YES  [ ] NO    MEDICATIONS  (STANDING):  amLODIPine   Tablet 2.5 milliGRAM(s) Oral daily  ascorbic acid 500 milliGRAM(s) Oral daily  atorvastatin 40 milliGRAM(s) Oral at bedtime  finasteride 5 milliGRAM(s) Oral daily  levETIRAcetam   Injectable 500 milliGRAM(s) IV Push every 12 hours  levothyroxine 50 MICROGram(s) Oral daily  multivitamin 1 Tablet(s) Oral daily  tamsulosin 0.4 milliGRAM(s) Oral at bedtime    MEDICATIONS  (PRN):  acetaminophen     Tablet .. 650 milliGRAM(s) Oral every 6 hours PRN Temp greater or equal to 38C (100.4F), Mild Pain (1 - 3)      Care Discussed with Consultants/Other Providers [x] YES  [ ] NO    HEALTH ISSUES - PROBLEM Dx:  Syncope, cardiogenic    Intraparenchymal hemorrhage of brain    CAD (coronary artery disease)    HTN (hypertension)    Other hypothyroidism    BPH with urinary obstruction    Acute encephalopathy    Traumatic fracture of cervical spine        
Patient is a 89y old  Male who presents with a chief complaint of s/p syncopal fall (28 Sep 2024 08:26)      SUBJECTIVE / OVERNIGHT EVENTS:    Patient seen and examined. no complaints.      Vital Signs Last 24 Hrs  T(C): 36.4 (29 Sep 2024 04:04), Max: 36.6 (28 Sep 2024 08:10)  T(F): 97.5 (29 Sep 2024 04:04), Max: 97.8 (28 Sep 2024 08:10)  HR: 78 (29 Sep 2024 06:20) (64 - 79)  BP: 149/91 (29 Sep 2024 06:20) (133/88 - 160/87)  BP(mean): --  RR: 18 (29 Sep 2024 04:04) (18 - 18)  SpO2: 94% (29 Sep 2024 04:04) (94% - 98%)    Parameters below as of 29 Sep 2024 04:04  Patient On (Oxygen Delivery Method): room air      I&O's Summary    28 Sep 2024 07:01  -  29 Sep 2024 07:00  --------------------------------------------------------  IN: 960 mL / OUT: 640 mL / NET: 320 mL        PE:  GENERAL: NAD, AAOx2  CHEST/LUNG: CTABL, No wheeze  HEART: Regular rate and rhythm; + murmur  ABDOMEN: Soft, Nontender, Nondistended; Bowel sounds present  EXTREMITIES:  2+ Peripheral Pulses, No edema  NEURO: No focal deficits      LABS:            CAPILLARY BLOOD GLUCOSE                RADIOLOGY & ADDITIONAL TESTS:    Imaging Personally Reviewed:  [x] YES  [ ] NO    Consultant(s) Notes Reviewed:  [x] YES  [ ] NO    MEDICATIONS  (STANDING):  amLODIPine   Tablet 2.5 milliGRAM(s) Oral daily  ascorbic acid 500 milliGRAM(s) Oral daily  atorvastatin 40 milliGRAM(s) Oral at bedtime  finasteride 5 milliGRAM(s) Oral daily  levETIRAcetam   Injectable 500 milliGRAM(s) IV Push every 12 hours  levothyroxine 50 MICROGram(s) Oral daily  multivitamin 1 Tablet(s) Oral daily  tamsulosin 0.4 milliGRAM(s) Oral at bedtime    MEDICATIONS  (PRN):  acetaminophen     Tablet .. 650 milliGRAM(s) Oral every 6 hours PRN Temp greater or equal to 38C (100.4F), Mild Pain (1 - 3)      Care Discussed with Consultants/Other Providers [x] YES  [ ] NO    HEALTH ISSUES - PROBLEM Dx:  Syncope, cardiogenic    Intraparenchymal hemorrhage of brain    CAD (coronary artery disease)    HTN (hypertension)    Other hypothyroidism    BPH with urinary obstruction    Acute encephalopathy    Traumatic fracture of cervical spine        
Patient is a 89y old  Male who presents with a chief complaint of s/p syncopal fall (29 Sep 2024 07:05)      SUBJECTIVE / OVERNIGHT EVENTS:    Patient seen and examined. no acute events. awaiting ROSITA.      Vital Signs Last 24 Hrs  T(C): 36.4 (30 Sep 2024 04:03), Max: 36.7 (29 Sep 2024 20:00)  T(F): 97.6 (30 Sep 2024 04:03), Max: 98.1 (29 Sep 2024 20:00)  HR: 66 (30 Sep 2024 04:03) (66 - 90)  BP: 154/87 (30 Sep 2024 04:03) (126/81 - 154/87)  BP(mean): --  RR: 18 (30 Sep 2024 04:03) (18 - 18)  SpO2: 97% (30 Sep 2024 04:03) (91% - 97%)    Parameters below as of 30 Sep 2024 04:03  Patient On (Oxygen Delivery Method): room air      I&O's Summary    29 Sep 2024 07:01  -  30 Sep 2024 07:00  --------------------------------------------------------  IN: 480 mL / OUT: 920 mL / NET: -440 mL        PE:  GENERAL: NAD, AAOx2  CHEST/LUNG: CTABL, No wheeze  HEART: Regular rate and rhythm; + murmur  ABDOMEN: Soft, Nontender, Nondistended; Bowel sounds present  EXTREMITIES:  2+ Peripheral Pulses, No edema  NEURO: No focal deficits    LABS:            CAPILLARY BLOOD GLUCOSE                RADIOLOGY & ADDITIONAL TESTS:    Imaging Personally Reviewed:  [x] YES  [ ] NO    Consultant(s) Notes Reviewed:  [x] YES  [ ] NO    MEDICATIONS  (STANDING):  amLODIPine   Tablet 2.5 milliGRAM(s) Oral daily  ascorbic acid 500 milliGRAM(s) Oral daily  atorvastatin 40 milliGRAM(s) Oral at bedtime  finasteride 5 milliGRAM(s) Oral daily  levothyroxine 50 MICROGram(s) Oral daily  multivitamin 1 Tablet(s) Oral daily  tamsulosin 0.4 milliGRAM(s) Oral at bedtime    MEDICATIONS  (PRN):  acetaminophen     Tablet .. 650 milliGRAM(s) Oral every 6 hours PRN Temp greater or equal to 38C (100.4F), Mild Pain (1 - 3)      Care Discussed with Consultants/Other Providers [x] YES  [ ] NO    HEALTH ISSUES - PROBLEM Dx:  Syncope, cardiogenic    Intraparenchymal hemorrhage of brain    CAD (coronary artery disease)    HTN (hypertension)    Other hypothyroidism    BPH with urinary obstruction    Acute encephalopathy    Traumatic fracture of cervical spine        
Patient is a 89y old  Male who presents with a chief complaint of s/p syncopal fall (25 Sep 2024 11:04)      SUBJECTIVE / OVERNIGHT EVENTS:    Patient seen and examined. NPO for PPM.      Vital Signs Last 24 Hrs  T(C): 36.7 (25 Sep 2024 12:26), Max: 36.8 (24 Sep 2024 16:37)  T(F): 98.1 (25 Sep 2024 11:02), Max: 98.3 (24 Sep 2024 16:37)  HR: 65 (25 Sep 2024 12:26) (65 - 138)  BP: 129/81 (25 Sep 2024 12:26) (119/80 - 162/96)  BP(mean): 115 (25 Sep 2024 12:26) (115 - 115)  RR: 18 (25 Sep 2024 12:26) (18 - 18)  SpO2: 95% (25 Sep 2024 12:26) (92% - 95%)    Parameters below as of 25 Sep 2024 11:02  Patient On (Oxygen Delivery Method): room air      I&O's Summary    24 Sep 2024 07:01  -  25 Sep 2024 07:00  --------------------------------------------------------  IN: 0 mL / OUT: 1300 mL / NET: -1300 mL    25 Sep 2024 07:01  -  25 Sep 2024 12:52  --------------------------------------------------------  IN: 0 mL / OUT: 400 mL / NET: -400 mL        PE:  GENERAL: NAD, AAOx2  CHEST/LUNG: CTABL, No wheeze  HEART: Regular rate and rhythm; + murmur  ABDOMEN: Soft, Nontender, Nondistended; Bowel sounds present  EXTREMITIES:  2+ Peripheral Pulses, No edema  NEURO: No focal deficits    LABS:                        15.4   13.07 )-----------( 250      ( 25 Sep 2024 04:45 )             43.7     09-25    139  |  103  |  18  ----------------------------<  118[H]  3.6   |  22  |  0.93    Ca    9.4      25 Sep 2024 04:45  Phos  2.6     09-25  Mg     2.0     09-25    TPro  6.9  /  Alb  3.7  /  TBili  1.2  /  DBili  x   /  AST  35  /  ALT  27  /  AlkPhos  72  09-25      CAPILLARY BLOOD GLUCOSE      POCT Blood Glucose.: 106 mg/dL (25 Sep 2024 04:09)        Urinalysis Basic - ( 25 Sep 2024 04:45 )    Color: x / Appearance: x / SG: x / pH: x  Gluc: 118 mg/dL / Ketone: x  / Bili: x / Urobili: x   Blood: x / Protein: x / Nitrite: x   Leuk Esterase: x / RBC: x / WBC x   Sq Epi: x / Non Sq Epi: x / Bacteria: x        RADIOLOGY & ADDITIONAL TESTS:    Imaging Personally Reviewed:  [x] YES  [ ] NO    Consultant(s) Notes Reviewed:  [x] YES  [ ] NO    MEDICATIONS  (STANDING):  amLODIPine   Tablet 2.5 milliGRAM(s) Oral daily  atorvastatin 40 milliGRAM(s) Oral at bedtime  finasteride 5 milliGRAM(s) Oral daily  levETIRAcetam   Injectable 500 milliGRAM(s) IV Push every 12 hours  levothyroxine 50 MICROGram(s) Oral daily  sodium chloride 0.9%. 1000 milliLiter(s) (50 mL/Hr) IV Continuous <Continuous>  tamsulosin 0.4 milliGRAM(s) Oral at bedtime    MEDICATIONS  (PRN):  acetaminophen     Tablet .. 650 milliGRAM(s) Oral every 6 hours PRN Temp greater or equal to 38C (100.4F), Mild Pain (1 - 3)      Care Discussed with Consultants/Other Providers [x] YES  [ ] NO    HEALTH ISSUES - PROBLEM Dx:  Syncope, cardiogenic    Intraparenchymal hemorrhage of brain    CAD (coronary artery disease)    HTN (hypertension)    Other hypothyroidism    BPH with urinary obstruction    Acute encephalopathy    Traumatic fracture of cervical spine        
Patient seen and examined at bedside.    --Anticoagulation--    T(C): 36.3 (09-23-24 @ 04:59), Max: 37.3 (09-22-24 @ 10:50)  HR: 74 (09-23-24 @ 04:59) (61 - 93)  BP: 155/99 (09-23-24 @ 04:59) (129/71 - 170/97)  RR: 18 (09-23-24 @ 04:59) (18 - 22)  SpO2: 95% (09-23-24 @ 04:59) (95% - 97%)  Wt(kg): --    Exam:  Awake, hard of hearing, Ox3, PERRL, EOMI, no facial, no drift, TATE 5/5. Left periorbital ecchymosis.

## 2024-09-30 NOTE — DISCHARGE NOTE PROVIDER - CARE PROVIDER_API CALL
Wai Grajeda  Cardiovascular Disease  [address] [facility], [facility] [address]  [place] [address]  Phone: [phone]  Fax: [phone]  Follow Up Time: 1 week   Wai Grajeda  Cardiovascular Disease  [address] [facility], [address]  [place] [address]  Phone: [phone]  Fax: [phone]  Follow Up Time: 1 week    Herve Reyes)  Orthopaedic Surgery  [address] [facility], [address]  [place] [address]  Phone: [phone]  Fax: [phone]  Follow Up Time:    Wai Grajeda  Cardiovascular Disease  [address] [facility], [address]  [place] [address]  Phone: [phone]  Fax: [phone]  Follow Up Time: 1 week    Herve Reyes)  Orthopaedic Surgery  [address] [facility], [address]  [place] [address]  Phone: [phone]  Fax: [phone]  Follow Up Time:     Vincenzo Hair  Cardiac Electrophysiology  [address] [facility], [address]  [place] [address]  Phone: [phone]  Follow Up Time:

## 2024-09-30 NOTE — PROGRESS NOTE ADULT - PROVIDER SPECIALTY LIST ADULT
Cardiology
Internal Medicine
Cardiology
Internal Medicine
Internal Medicine
Neurosurgery
Internal Medicine

## 2024-09-30 NOTE — DISCHARGE NOTE PROVIDER - NSDCMRMEDTOKEN_GEN_ALL_CORE_FT
aspirin 81 mg oral delayed release tablet: 1 tab(s) orally once a day    NOTE: as per pt, pt does not take aspirin if pt has taken aleve.  atorvastatin 40 mg oral tablet: 1 tab(s) orally once a day (at bedtime)  finasteride 5 mg oral tablet: 1 tab(s) orally once a day  Flomax 0.4 mg oral capsule: 1 cap(s) orally once a day (at bedtime)  levothyroxine 50 mcg (0.05 mg) oral tablet: 1 tab(s) orally once a day  Norvasc 2.5 mg oral tablet: 1 tab(s) orally once a day   acetaminophen 325 mg oral tablet: 2 tab(s) orally every 6 hours As needed Temp greater or equal to 38C (100.4F), Mild Pain (1 - 3)  ascorbic acid 500 mg oral tablet: 1 tab(s) orally once a day  atorvastatin 40 mg oral tablet: 1 tab(s) orally once a day (at bedtime)  finasteride 5 mg oral tablet: 1 tab(s) orally once a day  Flomax 0.4 mg oral capsule: 1 cap(s) orally once a day (at bedtime)  levothyroxine 50 mcg (0.05 mg) oral tablet: 1 tab(s) orally once a day  Multiple Vitamins oral tablet: 1 tab(s) orally once a day  Norvasc 2.5 mg oral tablet: 1 tab(s) orally once a day

## 2024-09-30 NOTE — DISCHARGE NOTE NURSING/CASE MANAGEMENT/SOCIAL WORK - NSFLUVACAGEDISCH_IMM_ALL_CORE
My Depression Action Plan  Name: Charlie Phillips   Date of Birth 1974  Date: 8/29/2019    My doctor: Saran Salvador   My clinic: [address] [address]  [phone]          GREEN    ZONE   Good Control    What it looks like:     Things are going generally well. You have normal up s and down s. You may even feel depressed from time to time, but bad moods usually last less than a day.   What you need to do:  1. Continue to care for yourself (see self care plan)  2. Check your depression survival kit and update it as needed  3. Follow your physician s recommendations including any medication.  4. Do not stop taking medication unless you consult with your physician first.           YELLOW         ZONE Getting Worse    What it looks like:     Depression is starting to interfere with your life.     It may be hard to get out of bed; you may be starting to isolate yourself from others.    Symptoms of depression are starting to last most all day and this has happened for several days.     You may have suicidal thoughts but they are not constant.   What you need to do:     1. Call your care team, your response to treatment will improve if you keep your care team informed of your progress. Yellow periods are signs an adjustment may need to be made.     2. Continue your self-care, even if you have to fake it!    3. Talk to someone in your support network    4. Open up your depression survival kit           RED    ZONE Medical Alert - Get Help    What it looks like:     Depression is seriously interfering with your life.     You may experience these or other symptoms: You can t get out of bed most days, can t work or engage in other necessary activities, you have trouble taking care of basic hygiene, or basic responsibilities, thoughts of suicide or death that will not go away, self-injurious behavior.     What you need to do:  1. Call your care team and  request a same-day appointment. If they are not available (weekends or after hours) call your local crisis line, emergency room or 911.            Depression Self Care Plan / Survival Kit    Self-Care for Depression  Here s the deal. Your body and mind are really not as separate as most people think.  What you do and think affects how you feel and how you feel influences what you do and think. This means if you do things that people who feel good do, it will help you feel better.  Sometimes this is all it takes.  There is also a place for medication and therapy depending on how severe your depression is, so be sure to consult with your medical provider and/ or Behavioral Health Consultant if your symptoms are worsening or not improving.     In order to better manage my stress, I will:    Exercise  Get some form of exercise, every day. This will help reduce pain and release endorphins, the  feel good  chemicals in your brain. This is almost as good as taking antidepressants!  This is not the same as joining a gym and then never going! (they count on that by the way ) It can be as simple as just going for a walk or doing some gardening, anything that will get you moving.      Hygiene   Maintain good hygiene (Get out of bed in the morning, Make your bed, Brush your teeth, Take a shower, and Get dressed like you were going to work, even if you are unemployed).  If your clothes don't fit try to get ones that do.    Diet  I will strive to eat foods that are good for me, drink plenty of water, and avoid excessive sugar, caffeine, alcohol, and other mood-altering substances.  Some foods that are helpful in depression are: complex carbohydrates, B vitamins, flaxseed, fish or fish oil, fresh fruits and vegetables.    Psychotherapy  I agree to participate in Individual Therapy (if recommended).    Medication  If prescribed medications, I agree to take them.  Missing doses can result in serious side effects.  I understand that  drinking alcohol, or other illicit drug use, may cause potential side effects.  I will not stop my medication abruptly without first discussing it with my provider.    Staying Connected With Others  I will stay in touch with my friends, family members, and my primary care provider/team.    Use your imagination  Be creative.  We all have a creative side; it doesn t matter if it s oil painting, sand castles, or mud pies! This will also kick up the endorphins.    Witness Beauty  (AKA stop and smell the roses) Take a look outside, even in mid-winter. Notice colors, textures. Watch the squirrels and birds.     Service to others  Be of service to others.  There is always someone else in need.  By helping others we can  get out of ourselves  and remember the really important things.  This also provides opportunities for practicing all the other parts of the program.    Humor  Laugh and be silly!  Adjust your TV habits for less news and crime-drama and more comedy.    Control your stress  Try breathing deep, massage therapy, biofeedback, and meditation. Find time to relax each day.     My support system    Clinic Contact:  Phone number:    Contact 1:  Phone number:    Contact 2:  Phone number:    Latter day/:  Phone number:    Therapist:  Phone number:    Local crisis center:    Phone number:    Other community support:  Phone number:       Adult

## 2024-09-30 NOTE — DISCHARGE NOTE PROVIDER - NSDCCPCAREPLAN_GEN_ALL_CORE_FT
PRINCIPAL DISCHARGE DIAGNOSIS  Diagnosis: Syncope  Assessment and Plan of Treatment: HOME CARE INSTRUCTIONS  Have someone stay with you until you feel stable.  Do not drive, operate machinery, or play sports until your caregiver says it is okay.  Keep all follow-up appointments as directed by your caregiver.   Lie down right away if you start feeling like you might faint. Breathe deeply and steadily. Wait until all the symptoms have passed.Drink enough fluids to keep your urine clear or pale yellow.  If you are taking blood pressure or heart medicine, get up slowly, taking several minutes to sit and then stand. This can reduce dizziness.  SEEK IMMEDIATE MEDICAL CARE IF:  You have a severe headache.  You have unusual pain in the chest, abdomen, or back.  You are bleeding from the mouth or rectum, or you have black or tarry stool.  You have an irregular or very fast heartbeat.  You have pain with breathing.  You have repeated fainting or seizure-like jerking during an episode.  You faint when sitting or lying down.  You have confusion.  You have difficulty walking.  You have severe weakness.  You have vision problems.  If you fainted, call your local emergency services. Do not drive yourself to the hospital      SECONDARY DISCHARGE DIAGNOSES  Diagnosis: Sinus pause  Assessment and Plan of Treatment: You had a Dual chamber PPM ( medtronic) placed & ILR explant on 9/25/24     PRINCIPAL DISCHARGE DIAGNOSIS  Diagnosis: Syncope  Assessment and Plan of Treatment: HOME CARE INSTRUCTIONS  Have someone stay with you until you feel stable.  Do not drive, operate machinery, or play sports until your caregiver says it is okay.  Keep all follow-up appointments as directed by your caregiver.   Lie down right away if you start feeling like you might faint. Breathe deeply and steadily. Wait until all the symptoms have passed.Drink enough fluids to keep your urine clear or pale yellow.  If you are taking blood pressure or heart medicine, get up slowly, taking several minutes to sit and then stand. This can reduce dizziness.  SEEK IMMEDIATE MEDICAL CARE IF:  You have a severe headache.  You have unusual pain in the chest, abdomen, or back.  You are bleeding from the mouth or rectum, or you have black or tarry stool.  You have an irregular or very fast heartbeat.  You have pain with breathing.  You have repeated fainting or seizure-like jerking during an episode.  You faint when sitting or lying down.  You have confusion.  You have difficulty walking.  You have severe weakness.  You have vision problems.  If you fainted, call your local emergency services. Do not drive yourself to the hospital      SECONDARY DISCHARGE DIAGNOSES  Diagnosis: Sinus pause  Assessment and Plan of Treatment: You had a Dual chamber PPM ( medtronic) placed & ILR explant on 9/25/24  Follow up with Dr. Hair    Diagnosis: Intraparenchymal hemorrhage of brain  Assessment and Plan of Treatment: - Aspirin on hold  -Repeat CT head stable  -Follow up with Neurosurgery    Diagnosis: Traumatic fracture of cervical spine  Assessment and Plan of Treatment: Traumatic fracture of cervical spine from syncopal fall  Acute displaced c4 on c5 posterior spinous process fracture  -Maintain C-collar in place until follow up kendell with Dr. Reyes  -Weight bearing as tolerated  -Physical therapy  -Follow up with neurosurgery     PRINCIPAL DISCHARGE DIAGNOSIS  Diagnosis: Syncope  Assessment and Plan of Treatment: HOME CARE INSTRUCTIONS  Have someone stay with you until you feel stable.  Do not drive, operate machinery, or play sports until your caregiver says it is okay.  Keep all follow-up appointments as directed by your caregiver.   Lie down right away if you start feeling like you might faint. Breathe deeply and steadily. Wait until all the symptoms have passed.Drink enough fluids to keep your urine clear or pale yellow.  If you are taking blood pressure or heart medicine, get up slowly, taking several minutes to sit and then stand. This can reduce dizziness.  SEEK IMMEDIATE MEDICAL CARE IF:  You have a severe headache.  You have unusual pain in the chest, abdomen, or back.  You are bleeding from the mouth or rectum, or you have black or tarry stool.  You have an irregular or very fast heartbeat.  You have pain with breathing.  You have repeated fainting or seizure-like jerking during an episode.  You faint when sitting or lying down.  You have confusion.  You have difficulty walking.  You have severe weakness.  You have vision problems.  If you fainted, call your local emergency services. Do not drive yourself to the hospital      SECONDARY DISCHARGE DIAGNOSES  Diagnosis: Sinus pause  Assessment and Plan of Treatment: You had a Dual chamber PPM ( medtronic) placed & ILR explant on 9/25/24  Follow up with Dr. Hair    Diagnosis: Intraparenchymal hemorrhage of brain  Assessment and Plan of Treatment: - Aspirin on hold  -Repeat CT head stable  -Follow up with Dr. Reyes    Diagnosis: Traumatic fracture of cervical spine  Assessment and Plan of Treatment: Traumatic fracture of cervical spine from syncopal fall  Acute displaced c4 on c5 posterior spinous process fracture  -Maintain C-collar in place until follow up kendell with Dr. Reyes  -Weight bearing as tolerated  -Physical therapy  -Follow up with Dr. Reyes

## 2024-09-30 NOTE — DISCHARGE NOTE NURSING/CASE MANAGEMENT/SOCIAL WORK - PATIENT PORTAL LINK FT
You can access the FollowMyHealth Patient Portal offered by [facility] by registering at the following website: [email or web address]. By joining [employer]’s FollowMyHealth portal, you will also be able to view your health information using other applications (apps) compatible with our system.

## 2024-09-30 NOTE — PROGRESS NOTE ADULT - ASSESSMENT
89 year-old male with HTN, HLD, CAD s/p CABG 1996, PCI w/DEWAYNE 2004, aortic stenosis, hypothyroid, BPH presents with a syncopal fall last night and hit face on concrete steps, time correlating with 5 sec pause on loop recorder, CTH w/ 6mm left frontal hyperdense lesion w/o surrounding edema and C4-5 spinous process fracture admitted for likely PPM and monitoring of possible ICH.    Syncope, cardiogenic  syncopal fall with 5 sec pause on loop recorder  - sp PPM     Traumatic Intraparenchymal hemorrhage of brain  s/p syncopal fall with head trauma, short interval CTH without significant change  - will hold ASA  - neuro checks and Keppra 500mg bid for seizure prophylaxis  - right shoulder pain no fx on XR    Traumatic fracture of cervical spine  - s/p syncopal fall with acute displaced c4 on c5 posterior spinous process fracture  - C-collar in place, recommends to continue cervical collar on discharge and outpt fu 1 week with Dr Reyes  - PT eval and WBAT b/l LE  - Ortho consult appreciated    CAD (coronary artery disease)  - will hold ASA give brain bleed   - will continue statin    HTN (hypertension)  - will continue Norvasc 2.5mg with holding parameters    Other hypothyroidism  - will continue Levothyroxine    BPH with urinary obstruction  chronic reed, bag changed in ED  - will continue Finasteride and Flomax    dvt ppx scd ac on hold 2/2 brain bleed     dc planning to [facility]    [facility]  [phone]  89 year-old male with HTN, HLD, CAD s/p CABG 1996, PCI w/DEWAYNE 2004, aortic stenosis, hypothyroid, BPH presents with a syncopal fall last night and hit face on concrete steps, time correlating with 5 sec pause on loop recorder, CTH w/ 6mm left frontal hyperdense lesion w/o surrounding edema and C4-5 spinous process fracture admitted for likely PPM and monitoring of possible ICH.    Syncope, cardiogenic  syncopal fall with 5 sec pause on loop recorder  - sp PPM     Traumatic Intraparenchymal hemorrhage of brain  s/p syncopal fall with head trauma, short interval CTH without significant change  - will hold ASA  - completed keppra  - right shoulder pain no fx on XR    Traumatic fracture of cervical spine  - s/p syncopal fall with acute displaced c4 on c5 posterior spinous process fracture  - C-collar in place, recommends to continue cervical collar on discharge and outpt fu 1 week with Dr Reyes  - PT eval and WBAT b/l LE  - Ortho consult appreciated    CAD (coronary artery disease)  - will hold ASA give brain bleed   - will continue statin    HTN (hypertension)  - will continue Norvasc 2.5mg with holding parameters    Other hypothyroidism  - will continue Levothyroxine    BPH with urinary obstruction  chronic reed, bag changed in ED  - will continue Finasteride and Flomax    dvt ppx scd ac on hold 2/2 brain bleed     dc planning to [facility]    [facility]  [phone]

## 2024-09-30 NOTE — DISCHARGE NOTE PROVIDER - HOSPITAL COURSE
HPI:  History mainly obtained from chart as patient is hard of hearing and unable to reach family     89 year old male with history of CAD s/p CABG 1996/DEWAYNE 2004 on ASA 81mg, HTN, HLD, aortic stenosis, hypothyroid, BPH with chronic reed presents after passing out last night while walking up the steps and hit his left face on steps.  Patient got up and went to bed last night.  Patient was called this am by his cardiologist and noted to have 5 sec pause on loop recorder at the time of the syncope.  Patient denies prodrome prior to the episode.  Currently, he denies chest pain, SOB, dizziness, nausea/vomiting, headache, vision changes or fever/chill.. c/o pain to left eye, right shoulder and neck (22 Sep 2024 22:54)    Hospital Course:  Pt admitted with Syncopal episode/Fall: likely cardiogenic, ILP w/ 5s pause, CTH w/ L scalp hematoma, CT C-spine w/ fx C4-5 spinous process, C collar in place, no intervention per ortho; repeat CT Head stable findings.  Pt found to have pause on ILR; s/p PPM on 9/25.  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  	Hx BPH w/ obsxn: pt w/ chronic reed, c/w finasteride, c/w flomax, 9/25: s/p Dual chamber PPM ( medtronic) & ILR explant, s/p ancef, s/p PO Keflex 250 q6 x 4 doses.  Seen by PT , rec [facility].  Completed 7 days keppra ppx Acute issues resolved.  Patient has been medically cleared for discharge as per Dr. Aquino.  Patient has been given appropriate discharge instructions including medication regimen, access site management and follow up. Medications that patient needs refills on (+/- new medications) have been e-prescribed to preferred pharmacy. Patient will f/u with Dr. Grajeda in 1-2 weeks for further management.     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  Patient got up and went to bed last night.  Patient was called this am by his cardiologist and noted to have 5 sec pause on loop recorder at the time of the syncope.  Patient denies prodrome prior to the episode.  Currently, he denies chest pain, SOB, dizziness, nausea/vomiting, headache, vision changes or fever/chill.. c/o pain to left eye, right shoulder and neck (22 Sep 2024 22:54)    Hospital Course:  Pt admitted with Syncopal episode/Fall: likely cardiogenic, ILP w/ 5s pause, CTH w/ L scalp hematoma, CT C-spine w/ fx C4-5 spinous process, C collar in place, no intervention per ortho; repeat CT Head stable findings.  Pt found to have pause on ILR; s/p PPM on 9/25.  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  	Hx BPH w/ obsxn: pt w/ chronic reed, c/w finasteride, c/w flomax, 9/25: s/p Dual chamber PPM ( medtronic) & ILR explant, s/p ancef, s/p PO Keflex 250 q6 x 4 doses.  Seen by PT , rec [facility].  Completed 7 days keppra ppx Acute issues resolved.  Patient has been medically cleared for discharge as per Dr. Aquino.  Patient has been given appropriate discharge instructions including medication regimen, and follow up. Patient will f/u with Dr. Grajeda in 1-2 weeks for further management.     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

## 2024-09-30 NOTE — PROGRESS NOTE ADULT - REASON FOR ADMISSION
s/p syncopal fall

## 2024-10-07 ENCOUNTER — APPOINTMENT (OUTPATIENT)
Dept: ORTHOPEDIC SURGERY | Facility: CLINIC | Age: 89
End: 2024-10-07
Payer: MEDICARE

## 2024-10-07 VITALS — WEIGHT: 187 LBS | HEIGHT: 69 IN | BODY MASS INDEX: 27.7 KG/M2

## 2024-10-07 DIAGNOSIS — M54.2 CERVICALGIA: ICD-10-CM

## 2024-10-07 PROCEDURE — 99203 OFFICE O/P NEW LOW 30 MIN: CPT

## 2024-12-09 ENCOUNTER — APPOINTMENT (OUTPATIENT)
Dept: ORTHOPEDIC SURGERY | Facility: CLINIC | Age: 89
End: 2024-12-09

## 2024-12-09 VITALS — WEIGHT: 187 LBS | HEIGHT: 69 IN | BODY MASS INDEX: 27.7 KG/M2

## 2024-12-09 DIAGNOSIS — M54.2 CERVICALGIA: ICD-10-CM

## 2024-12-09 PROCEDURE — 99214 OFFICE O/P EST MOD 30 MIN: CPT

## 2025-01-11 NOTE — ED ADULT TRIAGE NOTE - AS HEIGHT TYPE
stated
Pt present to the Ed with c/o non radiating chest pain . Denies sob, dizziness and abdominal pain . No acute distress noted at this time

## 2025-04-24 ENCOUNTER — OFFICE (OUTPATIENT)
Facility: LOCATION | Age: 89
Setting detail: OPHTHALMOLOGY
End: 2025-04-24
Payer: MEDICARE

## 2025-04-24 DIAGNOSIS — H02.834: ICD-10-CM

## 2025-04-24 DIAGNOSIS — H25.13: ICD-10-CM

## 2025-04-24 DIAGNOSIS — H02.831: ICD-10-CM

## 2025-04-24 DIAGNOSIS — H16.223: ICD-10-CM

## 2025-04-24 DIAGNOSIS — H18.593: ICD-10-CM

## 2025-04-24 DIAGNOSIS — H43.812: ICD-10-CM

## 2025-04-24 DIAGNOSIS — H18.592: ICD-10-CM

## 2025-04-24 DIAGNOSIS — H02.403: ICD-10-CM

## 2025-04-24 DIAGNOSIS — H53.2: ICD-10-CM

## 2025-04-24 DIAGNOSIS — D31.31: ICD-10-CM

## 2025-04-24 DIAGNOSIS — H18.591: ICD-10-CM

## 2025-04-24 PROBLEM — H01.005 BLEPHARITIS; LEFT UPPER LID, LEFT LOWER LID, RIGHT LOWER LID, RIGHT UPPER LID: Status: ACTIVE | Noted: 2025-04-24

## 2025-04-24 PROBLEM — H01.002 BLEPHARITIS; LEFT UPPER LID, LEFT LOWER LID, RIGHT LOWER LID, RIGHT UPPER LID: Status: ACTIVE | Noted: 2025-04-24

## 2025-04-24 PROBLEM — H01.004 BLEPHARITIS; LEFT UPPER LID, LEFT LOWER LID, RIGHT LOWER LID, RIGHT UPPER LID: Status: ACTIVE | Noted: 2025-04-24

## 2025-04-24 PROBLEM — H01.001 BLEPHARITIS; LEFT UPPER LID, LEFT LOWER LID, RIGHT LOWER LID, RIGHT UPPER LID: Status: ACTIVE | Noted: 2025-04-24

## 2025-04-24 PROCEDURE — 92014 COMPRE OPH EXAM EST PT 1/>: CPT | Performed by: OPHTHALMOLOGY

## 2025-04-24 ASSESSMENT — SUPERFICIAL PUNCTATE KERATITIS (SPK)
OS_SPK: 1+
OD_SPK: 1+

## 2025-04-24 ASSESSMENT — REFRACTION_CURRENTRX
OS_SPHERE: +5.50
OS_VPRISM_DIRECTION: SV
OD_OVR_VA: 20/
OD_SPHERE: 5.62
OD_SPHERE: +2.75
OS_CYLINDER: SPH
OD_VPRISM_DIRECTION: SV
OS_AXIS: 180
OS_CYLINDER: SPH
OD_VPRISM_DIRECTION: PROGS
OS_ADD: +3.50
OD_OVR_VA: 20/
OS_AXIS: 073
OD_CYLINDER: SPH
OS_VPRISM_DIRECTION: PROGS
OD_ADD: +2.25
OS_SPHERE: +2.75
OS_ADD: +2.25
OS_CYLINDER: -0.25
OS_OVR_VA: 20/
OD_SPHERE: +5.25
OD_CYLINDER: -0.75
OD_ADD: +3.50
OD_CYLINDER: SPH
OS_SPHERE: +2.50
OD_VPRISM_DIRECTION: PROGS
OS_SPHERE: 5.62
OD_CYLINDER: SPH
OS_SPHERE: +2.75
OS_VPRISM_DIRECTION: SV
OD_ADD: +3.00
OS_CYLINDER: -0.50
OD_VPRISM_DIRECTION: SV
OS_AXIS: 165
OD_CYLINDER: -0.50
OS_VPRISM_DIRECTION: SV
OS_VPRISM_DIRECTION: SV
OS_ADD: +3.50
OS_ADD: +3.00
OD_AXIS: 170
OD_AXIS: 117
OD_SPHERE: +5.75
OS_OVR_VA: 20/
OD_CYLINDER: SPH
OD_ADD: +3.62
OS_VPRISM_DIRECTION: PROGS
OD_CYLINDER: -0.12
OD_VPRISM_DIRECTION: SV
OD_OVR_VA: 20/
OS_SPHERE: +5.25
OD_VPRISM_DIRECTION: SV
OS_VPRISM_DIRECTION: PROGS
OD_SPHERE: +2.75
OD_SPHERE: +3.00
OD_VPRISM_DIRECTION: PROGS
OS_OVR_VA: 20/
OS_SPHERE: +2.75
OD_ADD2: +20.00
OD_AXIS: 048
OS_CYLINDER: SPH
OD_SPHERE: +2.75
OS_CYLINDER: SPH
OD_AXIS: 180
OS_CYLINDER: SPH

## 2025-04-24 ASSESSMENT — LID POSITION - COMMENTS
OD_COMMENTS: FLOPPY
OS_COMMENTS: FLOPPY

## 2025-04-24 ASSESSMENT — TONOMETRY
OD_IOP_MMHG: 19
OS_IOP_MMHG: 16

## 2025-04-24 ASSESSMENT — REFRACTION_MANIFEST
OS_SPHERE: +2.25
OD_ADD: +2.50
OD_AXIS: 155
OD_CYLINDER: -0.75
OS_ADD: +2.25
OD_AXIS: 125
OS_SPHERE: +3.25
OD_CYLINDER: -1.25
OD_VA1: 20/30-2
OD_CYLINDER: -0.75
OD_SPHERE: +2.75
OS_ADD: +2.50
OD_SPHERE: +3.00
OS_SPHERE: +2.50
OS_VA2: 20/30
OS_VA1: 20/40+-
OD_ADD: +2.50
OS_AXIS: 030
OD_ADD: +2.25
OD_CYLINDER: -0.25
OS_VA1: 20/40
OD_CYLINDER: -0.75
OD_AXIS: 125
OU_VA: 20/25-1
OD_VA1: 20/30-2
OS_CYLINDER: SPH
OS_AXIS: 080
OD_AXIS: 125
OD_SPHERE: +2.50
OD_SPHERE: +2.75
OD_VA1: 20/40+-
OS_VA1: 20/30-2
OS_CYLINDER: -0.75
OS_VA2: 20/30(J2)
OS_CYLINDER: -0.50
OD_SPHERE: +3.00
OD_VA2: 20/30
OD_VA2: 20/30(J2)
OD_AXIS: 125
OS_ADD: +2.50
OD_VA1: 20/20
OS_CYLINDER: SPH
OS_VA1: 20/25-2
OS_SPHERE: +2.75
OS_SPHERE: +2.25

## 2025-04-24 ASSESSMENT — VISUAL ACUITY
OS_BCVA: 20/40-2
OD_BCVA: 20/30+2

## 2025-04-24 ASSESSMENT — KERATOMETRY
OD_K1POWER_DIOPTERS: 42.75
OD_AXISANGLE_DEGREES: 086
OS_K1POWER_DIOPTERS: 43.25
OD_K2POWER_DIOPTERS: 45.25
OS_AXISANGLE_DEGREES: 072
OS_K2POWER_DIOPTERS: 44.75
METHOD_AUTO_MANUAL: AUTO

## 2025-04-24 ASSESSMENT — LID EXAM ASSESSMENTS
OS_BLEPHARITIS: LLL LUL 2+
OD_BLEPHARITIS: RLL RUL 2+

## 2025-04-24 ASSESSMENT — REFRACTION_AUTOREFRACTION
OS_AXIS: 078
OD_CYLINDER: -0.75
OD_AXIS: 119
OD_SPHERE: +3.00
OS_SPHERE: +3.25
OS_CYLINDER: -0.50

## 2025-04-24 ASSESSMENT — LID POSITION - DERMATOCHALASIS
OD_DERMATOCHALASIS: RUL 2+ 3+
OS_DERMATOCHALASIS: LUL 2+ 3+

## 2025-04-24 ASSESSMENT — CONFRONTATIONAL VISUAL FIELD TEST (CVF)
OS_FINDINGS: FULL
OD_FINDINGS: FULL

## 2025-04-24 ASSESSMENT — LID POSITION - PTOSIS
OD_PTOSIS: RUL T
OS_PTOSIS: LUL T

## 2025-04-24 ASSESSMENT — TEAR BREAK UP TIME (TBUT)
OD_TBUT: 1+
OS_TBUT: 1+